# Patient Record
Sex: FEMALE | Race: WHITE | NOT HISPANIC OR LATINO | Employment: UNEMPLOYED | URBAN - NONMETROPOLITAN AREA
[De-identification: names, ages, dates, MRNs, and addresses within clinical notes are randomized per-mention and may not be internally consistent; named-entity substitution may affect disease eponyms.]

---

## 2020-09-17 RX ORDER — NORGESTIMATE AND ETHINYL ESTRADIOL 7DAYSX3 28
KIT ORAL
Qty: 28 TABLET | Refills: 0 | Status: SHIPPED | OUTPATIENT
Start: 2020-09-17 | End: 2020-10-08

## 2020-09-18 ENCOUNTER — TELEPHONE (OUTPATIENT)
Dept: OBSTETRICS AND GYNECOLOGY | Facility: CLINIC | Age: 30
End: 2020-09-18

## 2020-09-18 NOTE — TELEPHONE ENCOUNTER
Spoke with patient and she is aware that she needs annual before next refill; does not wish to schedule now but states she will call us to do so    ----- Message from Aspen Mcmahon NP sent at 9/17/2020 11:14 AM CDT -----  Birth control sent to pharmacy for one month, pt is overdue for annual

## 2021-03-22 ENCOUNTER — OFFICE VISIT (OUTPATIENT)
Dept: OBSTETRICS AND GYNECOLOGY | Facility: CLINIC | Age: 31
End: 2021-03-22
Payer: COMMERCIAL

## 2021-03-22 VITALS
BODY MASS INDEX: 49.87 KG/M2 | WEIGHT: 254 LBS | HEIGHT: 60 IN | DIASTOLIC BLOOD PRESSURE: 80 MMHG | SYSTOLIC BLOOD PRESSURE: 137 MMHG

## 2021-03-22 DIAGNOSIS — Z11.51 SPECIAL SCREENING EXAMINATION FOR HUMAN PAPILLOMAVIRUS (HPV): ICD-10-CM

## 2021-03-22 DIAGNOSIS — N83.209 CYST OF OVARY, UNSPECIFIED LATERALITY: ICD-10-CM

## 2021-03-22 DIAGNOSIS — Z01.419 WELL WOMAN EXAM: Primary | ICD-10-CM

## 2021-03-22 DIAGNOSIS — E66.01 MORBID OBESITY: ICD-10-CM

## 2021-03-22 DIAGNOSIS — N85.2 ENLARGED UTERUS: ICD-10-CM

## 2021-03-22 DIAGNOSIS — E03.9 HYPOTHYROIDISM, UNSPECIFIED TYPE: ICD-10-CM

## 2021-03-22 DIAGNOSIS — Z12.4 SCREENING FOR MALIGNANT NEOPLASM OF THE CERVIX: ICD-10-CM

## 2021-03-22 DIAGNOSIS — Z30.09 FAMILY PLANNING: ICD-10-CM

## 2021-03-22 PROCEDURE — 99385 PR PREVENTIVE VISIT,NEW,18-39: ICD-10-PCS | Mod: S$GLB,,, | Performed by: OBSTETRICS & GYNECOLOGY

## 2021-03-22 PROCEDURE — 99999 PR PBB SHADOW E&M-EST. PATIENT-LVL III: ICD-10-PCS | Mod: PBBFAC,,, | Performed by: OBSTETRICS & GYNECOLOGY

## 2021-03-22 PROCEDURE — 99385 PREV VISIT NEW AGE 18-39: CPT | Mod: S$GLB,,, | Performed by: OBSTETRICS & GYNECOLOGY

## 2021-03-22 PROCEDURE — 99999 PR PBB SHADOW E&M-EST. PATIENT-LVL III: CPT | Mod: PBBFAC,,, | Performed by: OBSTETRICS & GYNECOLOGY

## 2021-03-22 RX ORDER — LEVOTHYROXINE SODIUM 175 UG/1
175 TABLET ORAL DAILY
COMMUNITY
Start: 2021-01-09 | End: 2022-09-12 | Stop reason: SDUPTHER

## 2021-03-22 RX ORDER — PREGABALIN 50 MG/1
50 CAPSULE ORAL NIGHTLY
COMMUNITY
Start: 2020-12-23 | End: 2023-06-28 | Stop reason: DRUGHIGH

## 2021-03-22 RX ORDER — LEVOTHYROXINE SODIUM 175 UG/1
175 TABLET ORAL
Qty: 30 TABLET | Refills: 11 | Status: SHIPPED | OUTPATIENT
Start: 2021-03-22 | End: 2021-08-18 | Stop reason: SDUPTHER

## 2021-03-22 RX ORDER — NORGESTIMATE AND ETHINYL ESTRADIOL 7DAYSX3 28
1 KIT ORAL DAILY
Qty: 28 TABLET | Refills: 11 | Status: SHIPPED | OUTPATIENT
Start: 2021-03-22 | End: 2022-02-14

## 2021-03-26 LAB
HPV16+18+H RISK 12 DNA CVX-IMP: NORMAL
LIQUID BASED PAP SMEAR, SCREENING: NORMAL

## 2021-03-29 ENCOUNTER — OFFICE VISIT (OUTPATIENT)
Dept: OBSTETRICS AND GYNECOLOGY | Facility: CLINIC | Age: 31
End: 2021-03-29
Payer: COMMERCIAL

## 2021-03-29 ENCOUNTER — PROCEDURE VISIT (OUTPATIENT)
Dept: OBSTETRICS AND GYNECOLOGY | Facility: CLINIC | Age: 31
End: 2021-03-29
Payer: COMMERCIAL

## 2021-03-29 VITALS
BODY MASS INDEX: 51.04 KG/M2 | WEIGHT: 260 LBS | DIASTOLIC BLOOD PRESSURE: 70 MMHG | SYSTOLIC BLOOD PRESSURE: 135 MMHG | HEIGHT: 60 IN

## 2021-03-29 DIAGNOSIS — N83.209 CYST OF OVARY, UNSPECIFIED LATERALITY: Primary | ICD-10-CM

## 2021-03-29 DIAGNOSIS — R10.2 PELVIC PAIN: Primary | ICD-10-CM

## 2021-03-29 PROCEDURE — 76830 TRANSVAGINAL US NON-OB: CPT | Mod: 26,S$GLB,, | Performed by: OBSTETRICS & GYNECOLOGY

## 2021-03-29 PROCEDURE — 76830 PR  ECHOGRAPHY,TRANSVAGINAL: ICD-10-PCS | Mod: 26,S$GLB,, | Performed by: OBSTETRICS & GYNECOLOGY

## 2021-03-29 PROCEDURE — 86304 IMMUNOASSAY TUMOR CA 125: CPT | Performed by: OBSTETRICS & GYNECOLOGY

## 2021-03-29 PROCEDURE — 99214 OFFICE O/P EST MOD 30 MIN: CPT | Mod: 25,S$GLB,, | Performed by: OBSTETRICS & GYNECOLOGY

## 2021-03-29 PROCEDURE — 99999 PR PBB SHADOW E&M-EST. PATIENT-LVL III: ICD-10-PCS | Mod: PBBFAC,,, | Performed by: OBSTETRICS & GYNECOLOGY

## 2021-03-29 PROCEDURE — 99214 PR OFFICE/OUTPT VISIT, EST, LEVL IV, 30-39 MIN: ICD-10-PCS | Mod: 25,S$GLB,, | Performed by: OBSTETRICS & GYNECOLOGY

## 2021-03-29 PROCEDURE — 99999 PR PBB SHADOW E&M-EST. PATIENT-LVL III: CPT | Mod: PBBFAC,,, | Performed by: OBSTETRICS & GYNECOLOGY

## 2021-03-30 LAB — CANCER AG125 SERPL-ACNC: 32 U/ML (ref 0–30)

## 2021-04-26 ENCOUNTER — CLINICAL SUPPORT (OUTPATIENT)
Dept: OBSTETRICS AND GYNECOLOGY | Facility: CLINIC | Age: 31
End: 2021-04-26
Payer: COMMERCIAL

## 2021-04-26 DIAGNOSIS — N83.209 CYST OF OVARY, UNSPECIFIED LATERALITY: Primary | ICD-10-CM

## 2021-04-26 DIAGNOSIS — R89.9 ELEVATED LABORATORY TEST RESULT: ICD-10-CM

## 2021-04-26 PROCEDURE — 86304 IMMUNOASSAY TUMOR CA 125: CPT | Performed by: OBSTETRICS & GYNECOLOGY

## 2021-04-27 LAB — CANCER AG125 SERPL-ACNC: 30 U/ML (ref 0–30)

## 2021-08-18 DIAGNOSIS — E03.9 HYPOTHYROIDISM, UNSPECIFIED TYPE: ICD-10-CM

## 2021-08-19 RX ORDER — LEVOTHYROXINE SODIUM 175 UG/1
175 TABLET ORAL
Qty: 30 TABLET | Refills: 11 | Status: SHIPPED | OUTPATIENT
Start: 2021-08-19 | End: 2022-08-19

## 2022-09-12 RX ORDER — LEVOTHYROXINE SODIUM 175 UG/1
175 TABLET ORAL DAILY
Qty: 30 TABLET | Refills: 1 | Status: SHIPPED | OUTPATIENT
Start: 2022-09-12 | End: 2022-10-05

## 2022-09-22 ENCOUNTER — HOSPITAL ENCOUNTER (OUTPATIENT)
Dept: RADIOLOGY | Facility: HOSPITAL | Age: 32
Discharge: HOME | End: 2022-09-22
Attending: PHYSICIAN ASSISTANT
Payer: COMMERCIAL

## 2022-09-22 DIAGNOSIS — R60.9 EDEMA: ICD-10-CM

## 2022-09-22 DIAGNOSIS — R60.9 EDEMA: Primary | ICD-10-CM

## 2022-09-22 PROCEDURE — 93971 EXTREMITY STUDY: CPT | Mod: TC,LT

## 2022-10-05 ENCOUNTER — PROCEDURE VISIT (OUTPATIENT)
Dept: OBSTETRICS AND GYNECOLOGY | Facility: CLINIC | Age: 32
End: 2022-10-05
Payer: COMMERCIAL

## 2022-10-05 ENCOUNTER — OFFICE VISIT (OUTPATIENT)
Dept: OBSTETRICS AND GYNECOLOGY | Facility: CLINIC | Age: 32
End: 2022-10-05
Payer: COMMERCIAL

## 2022-10-05 VITALS
HEART RATE: 87 BPM | HEIGHT: 60 IN | SYSTOLIC BLOOD PRESSURE: 137 MMHG | BODY MASS INDEX: 42.41 KG/M2 | WEIGHT: 216 LBS | DIASTOLIC BLOOD PRESSURE: 94 MMHG

## 2022-10-05 DIAGNOSIS — E66.01 MORBID OBESITY: ICD-10-CM

## 2022-10-05 DIAGNOSIS — R10.2 PELVIC PAIN: Primary | ICD-10-CM

## 2022-10-05 DIAGNOSIS — N83.201 CYSTS OF BOTH OVARIES: ICD-10-CM

## 2022-10-05 DIAGNOSIS — N83.202 CYSTS OF BOTH OVARIES: ICD-10-CM

## 2022-10-05 DIAGNOSIS — E03.9 HYPOTHYROIDISM, UNSPECIFIED TYPE: ICD-10-CM

## 2022-10-05 DIAGNOSIS — R89.9 ELEVATED LABORATORY TEST RESULT: ICD-10-CM

## 2022-10-05 PROCEDURE — 76856 US OB/GYN EXTENDED PROCEDURE (VIEWPOINT): ICD-10-PCS | Mod: S$GLB,,, | Performed by: OBSTETRICS & GYNECOLOGY

## 2022-10-05 PROCEDURE — 76856 US EXAM PELVIC COMPLETE: CPT | Mod: S$GLB,,, | Performed by: OBSTETRICS & GYNECOLOGY

## 2022-10-05 PROCEDURE — 99999 PR PBB SHADOW E&M-EST. PATIENT-LVL III: ICD-10-PCS | Mod: PBBFAC,,, | Performed by: OBSTETRICS & GYNECOLOGY

## 2022-10-05 PROCEDURE — 99214 PR OFFICE/OUTPT VISIT, EST, LEVL IV, 30-39 MIN: ICD-10-PCS | Mod: 25,S$GLB,, | Performed by: OBSTETRICS & GYNECOLOGY

## 2022-10-05 PROCEDURE — 99999 PR PBB SHADOW E&M-EST. PATIENT-LVL III: CPT | Mod: PBBFAC,,, | Performed by: OBSTETRICS & GYNECOLOGY

## 2022-10-05 PROCEDURE — 86304 IMMUNOASSAY TUMOR CA 125: CPT | Performed by: OBSTETRICS & GYNECOLOGY

## 2022-10-05 PROCEDURE — 99214 OFFICE O/P EST MOD 30 MIN: CPT | Mod: 25,S$GLB,, | Performed by: OBSTETRICS & GYNECOLOGY

## 2022-10-05 RX ORDER — FUROSEMIDE 20 MG/1
20 TABLET ORAL DAILY
COMMUNITY
Start: 2022-09-22 | End: 2022-10-20 | Stop reason: SDUPTHER

## 2022-10-05 RX ORDER — LEVOTHYROXINE SODIUM 200 UG/1
200 TABLET ORAL DAILY
Qty: 30 TABLET | Refills: 3 | Status: SHIPPED | OUTPATIENT
Start: 2022-10-05 | End: 2022-12-08

## 2022-10-05 NOTE — PROGRESS NOTES
Subjective:    Patient ID: Mya Sarmiento is a 32 y.o. y.o. female    Chief Complaint:   Chief Complaint   Patient presents with    Pelvic Pain     C/o left sided pelvic pain on.off for the past month. States it hurts more after working out and states pain radiates to her back. Also c/o abnormal spotting throughout her cycle.       History of Present Illness:  Mya presents today for follow up ultrasound due to left-sided pelvic pain.  She still hurts worse after working out and that radiates to her back.  She has had some abnormal spotting throughout her cycle.  She has lost a significant amount of weight the last year.      Review of Systems   Constitutional:  Negative for chills, fatigue and fever.   Respiratory:  Negative for shortness of breath.    Cardiovascular:  Negative for chest pain.   Gastrointestinal:  Negative for abdominal pain, constipation, diarrhea and nausea.   Genitourinary:  Positive for pelvic pain. Negative for bladder incontinence, dysuria, hot flashes and vaginal bleeding.   Neurological:  Negative for headaches.   Psychiatric/Behavioral:  Negative for depression.        Objective:    Vital Signs:  Vitals:    10/05/22 0951   BP: (!) 137/94   Pulse: 87     Wt Readings from Last 1 Encounters:   10/05/22 98 kg (216 lb)     Body mass index is 42.18 kg/m².    Physical Exam:  General:  alert, no distress   Skin:  Skin color, texture, turgor normal. No rashes or lesions   Abdomen:   Soft, obese, nontender   Extremities: No cyanosis, clubbing, edema     Ultrasound: right ovary with a septated cyst largest diameter 10.1 cm; left ovary with a septated cyst largest diameter 11.9 cm.  These have not changed much in size since March of 2021.     Discussion with patient and her  regarding need for laparotomy and cystectomy.  Will also order  level to assess if there is any concern for malignancy.  In discussion with the couple I was concerned as these cysts have not been removed  previously.  She states that she was told that if they did not bother her she did not need them removed.  She does report more pelvic pain now so removal is definitely warranted.  Her  states that they tried to schedule removal but there insurance is out of network with our hospital.  He he is asking if I can go to a different hospital and I explained that I do not have privileges at other hospitals.  I will recommend physicians that have privileges at surrounding hospitals that are not in our network who hopefully have better coverage with his insurance in order to have these surgeries performed. All questions answered.    I spent a total of 30 minutes on the day of the visit.  This includes face to face time and non-face to face time preparing to see the patient (eg, review of tests), obtaining and/or reviewing separately obtained history, documenting clinical information in the electronic or other health record, independently interpreting results and communicating results to the patient/family/caregiver, or care coordinator.         Assessment:      1. Pelvic pain    2. Cysts of both ovaries    3. Morbid obesity    4. Hypothyroidism, unspecified type    5. Elevated laboratory test result          Plan:      Pelvic pain  -     ; Future; Expected date: 10/05/2022    Cysts of both ovaries  -     ; Future; Expected date: 10/05/2022    Morbid obesity    Hypothyroidism, unspecified type    Elevated laboratory test result    Gave names of Dr. Davis (Oleksandr) and the Paolo Mohamud (Rachelle) in order to be evaluated for surgery at another hospital that is in network with their insurance       Ashanti Turner MD, FACOG   10/05/2022 10:15 AM

## 2022-10-06 LAB — CANCER AG125 SERPL-ACNC: 162 U/ML (ref 0–30)

## 2022-10-06 NOTE — PROGRESS NOTES
Please call patient regarding results.  CA-125 more elevated--needs surgery. Needs to make an appt with gyn.

## 2022-10-20 ENCOUNTER — TELEPHONE (OUTPATIENT)
Dept: OBSTETRICS AND GYNECOLOGY | Facility: CLINIC | Age: 32
End: 2022-10-20

## 2022-10-20 RX ORDER — FUROSEMIDE 20 MG/1
20 TABLET ORAL DAILY
Qty: 30 TABLET | Refills: 0 | Status: SHIPPED | OUTPATIENT
Start: 2022-10-20 | End: 2022-11-14

## 2022-10-20 NOTE — TELEPHONE ENCOUNTER
Pt needs Lasix refill, on her last visit pt was told to call if she needed this medication refill. [facility]

## 2022-12-01 PROBLEM — I07.1 SEVERE TRICUSPID REGURGITATION: Status: RESOLVED | Noted: 2022-12-01 | Resolved: 2022-12-01

## 2022-12-01 PROBLEM — I27.21 PULMONARY ARTERIAL HYPERTENSION: Chronic | Status: ACTIVE | Noted: 2022-12-01

## 2022-12-01 PROBLEM — I27.20 PULMONARY HYPERTENSION: Status: ACTIVE | Noted: 2022-12-01

## 2022-12-01 PROBLEM — I27.20 PULMONARY HYPERTENSION: Status: RESOLVED | Noted: 2022-12-01 | Resolved: 2022-12-01

## 2022-12-01 PROBLEM — I07.1 SEVERE TRICUSPID REGURGITATION: Status: ACTIVE | Noted: 2022-12-01

## 2022-12-22 ENCOUNTER — TELEPHONE (OUTPATIENT)
Dept: TRANSPLANT | Facility: CLINIC | Age: 32
End: 2022-12-22
Payer: COMMERCIAL

## 2022-12-22 DIAGNOSIS — I27.9 CHRONIC PULMONARY HEART DISEASE: ICD-10-CM

## 2022-12-22 DIAGNOSIS — R06.82 TACHYPNEA: ICD-10-CM

## 2022-12-22 DIAGNOSIS — Z79.899 POLYPHARMACY: Primary | ICD-10-CM

## 2023-01-10 ENCOUNTER — LAB VISIT (OUTPATIENT)
Dept: LAB | Facility: HOSPITAL | Age: 33
End: 2023-01-10
Payer: COMMERCIAL

## 2023-01-10 ENCOUNTER — HOSPITAL ENCOUNTER (OUTPATIENT)
Dept: PULMONOLOGY | Facility: CLINIC | Age: 33
Discharge: HOME | End: 2023-01-10
Payer: COMMERCIAL

## 2023-01-10 ENCOUNTER — DOCUMENTATION ONLY (OUTPATIENT)
Dept: TRANSPLANT | Facility: CLINIC | Age: 33
End: 2023-01-10
Payer: COMMERCIAL

## 2023-01-10 ENCOUNTER — OFFICE VISIT (OUTPATIENT)
Dept: TRANSPLANT | Facility: CLINIC | Age: 33
End: 2023-01-10
Payer: COMMERCIAL

## 2023-01-10 VITALS — HEIGHT: 60 IN | BODY MASS INDEX: 40.05 KG/M2 | WEIGHT: 204 LBS

## 2023-01-10 VITALS
BODY MASS INDEX: 40.52 KG/M2 | HEART RATE: 73 BPM | SYSTOLIC BLOOD PRESSURE: 118 MMHG | WEIGHT: 206.38 LBS | HEIGHT: 60 IN | OXYGEN SATURATION: 97 % | DIASTOLIC BLOOD PRESSURE: 76 MMHG

## 2023-01-10 DIAGNOSIS — E07.9 THYROID DISEASE: ICD-10-CM

## 2023-01-10 DIAGNOSIS — R06.82 TACHYPNEA: ICD-10-CM

## 2023-01-10 DIAGNOSIS — I27.21 PULMONARY ARTERIAL HYPERTENSION: Primary | Chronic | ICD-10-CM

## 2023-01-10 DIAGNOSIS — N83.209 CYST OF OVARY, UNSPECIFIED LATERALITY: ICD-10-CM

## 2023-01-10 DIAGNOSIS — Z79.899 POLYPHARMACY: ICD-10-CM

## 2023-01-10 DIAGNOSIS — I27.9 CHRONIC PULMONARY HEART DISEASE: ICD-10-CM

## 2023-01-10 LAB
ALBUMIN SERPL BCP-MCNC: 2.9 G/DL (ref 3.5–5.2)
ALP SERPL-CCNC: 96 U/L (ref 55–135)
ALT SERPL W/O P-5'-P-CCNC: 37 U/L (ref 10–44)
ANION GAP SERPL CALC-SCNC: 10 MMOL/L (ref 8–16)
AST SERPL-CCNC: 34 U/L (ref 10–40)
BASOPHILS # BLD AUTO: 0.05 K/UL (ref 0–0.2)
BASOPHILS NFR BLD: 0.8 % (ref 0–1.9)
BILIRUB SERPL-MCNC: 0.9 MG/DL (ref 0.1–1)
BNP SERPL-MCNC: 235 PG/ML (ref 0–99)
BUN SERPL-MCNC: 17 MG/DL (ref 6–20)
CALCIUM SERPL-MCNC: 8.7 MG/DL (ref 8.7–10.5)
CHLORIDE SERPL-SCNC: 107 MMOL/L (ref 95–110)
CO2 SERPL-SCNC: 22 MMOL/L (ref 23–29)
CREAT SERPL-MCNC: 0.8 MG/DL (ref 0.5–1.4)
DIFFERENTIAL METHOD: ABNORMAL
EOSINOPHIL # BLD AUTO: 0.3 K/UL (ref 0–0.5)
EOSINOPHIL NFR BLD: 5.6 % (ref 0–8)
ERYTHROCYTE [DISTWIDTH] IN BLOOD BY AUTOMATED COUNT: 17.5 % (ref 11.5–14.5)
EST. GFR  (NO RACE VARIABLE): >60 ML/MIN/1.73 M^2
GLUCOSE SERPL-MCNC: 74 MG/DL (ref 70–110)
HCT VFR BLD AUTO: 37.3 % (ref 37–48.5)
HGB BLD-MCNC: 10.9 G/DL (ref 12–16)
IMM GRANULOCYTES # BLD AUTO: 0.02 K/UL (ref 0–0.04)
IMM GRANULOCYTES NFR BLD AUTO: 0.3 % (ref 0–0.5)
LYMPHOCYTES # BLD AUTO: 0.9 K/UL (ref 1–4.8)
LYMPHOCYTES NFR BLD: 14.4 % (ref 18–48)
MAGNESIUM SERPL-MCNC: 2.1 MG/DL (ref 1.6–2.6)
MCH RBC QN AUTO: 21.7 PG (ref 27–31)
MCHC RBC AUTO-ENTMCNC: 29.2 G/DL (ref 32–36)
MCV RBC AUTO: 74 FL (ref 82–98)
MONOCYTES # BLD AUTO: 0.5 K/UL (ref 0.3–1)
MONOCYTES NFR BLD: 8.1 % (ref 4–15)
NEUTROPHILS # BLD AUTO: 4.2 K/UL (ref 1.8–7.7)
NEUTROPHILS NFR BLD: 70.8 % (ref 38–73)
NRBC BLD-RTO: 0 /100 WBC
PLATELET # BLD AUTO: 266 K/UL (ref 150–450)
PMV BLD AUTO: 11.6 FL (ref 9.2–12.9)
POTASSIUM SERPL-SCNC: 4.2 MMOL/L (ref 3.5–5.1)
PROT SERPL-MCNC: 6.4 G/DL (ref 6–8.4)
RBC # BLD AUTO: 5.02 M/UL (ref 4–5.4)
SODIUM SERPL-SCNC: 139 MMOL/L (ref 136–145)
WBC # BLD AUTO: 5.91 K/UL (ref 3.9–12.7)

## 2023-01-10 PROCEDURE — 99204 PR OFFICE/OUTPT VISIT, NEW, LEVL IV, 45-59 MIN: ICD-10-PCS | Mod: S$GLB,,,

## 2023-01-10 PROCEDURE — 83880 ASSAY OF NATRIURETIC PEPTIDE: CPT

## 2023-01-10 PROCEDURE — 83735 ASSAY OF MAGNESIUM: CPT

## 2023-01-10 PROCEDURE — 99999 PR PBB SHADOW E&M-EST. PATIENT-LVL IV: CPT | Mod: PBBFAC,,,

## 2023-01-10 PROCEDURE — 85025 COMPLETE CBC W/AUTO DIFF WBC: CPT

## 2023-01-10 PROCEDURE — 80053 COMPREHEN METABOLIC PANEL: CPT

## 2023-01-10 PROCEDURE — 94618 PULMONARY STRESS TESTING: ICD-10-PCS | Mod: S$GLB,,, | Performed by: INTERNAL MEDICINE

## 2023-01-10 PROCEDURE — 99999 PR PBB SHADOW E&M-EST. PATIENT-LVL IV: ICD-10-PCS | Mod: PBBFAC,,,

## 2023-01-10 PROCEDURE — 36415 COLL VENOUS BLD VENIPUNCTURE: CPT

## 2023-01-10 PROCEDURE — 94618 PULMONARY STRESS TESTING: CPT | Mod: S$GLB,,, | Performed by: INTERNAL MEDICINE

## 2023-01-10 PROCEDURE — 99204 OFFICE O/P NEW MOD 45 MIN: CPT | Mod: S$GLB,,,

## 2023-01-10 RX ORDER — LEVOTHYROXINE SODIUM 175 UG/1
175 TABLET ORAL DAILY
COMMUNITY
Start: 2022-12-15

## 2023-01-10 NOTE — PATIENT INSTRUCTIONS
Schedule labs, right heart cath and appointment.      Contact Shayna Maciel at [phone] with any questions.      Recommend 2 gram sodium restriction and 1500cc fluid restriction.  Encourage physical activity with graded exercise program.  Requested patient to weigh themselves daily, and to notify us if their weight increases by more than 3 lbs in 1 day or 5 lbs in 1 week.

## 2023-01-10 NOTE — PROCEDURES
Mya Sarmiento is a 32 y.o.  female patient, who presents for a 6 minute walk test ordered by JENNIFER Carvalho.  The diagnosis is Pulmonary Hypertension.  The patient's BMI is 39.8 kg/m2.  Predicted distance (lower limit of normal) is 443.09 meters.      Test Results:    The test was completed without stopping.  The total time walked was 360 seconds.  During walking, the patient reported:  Dyspnea.  The patient used no assistive devices during testing.     01/10/2023---------Distance: 365.76 meters (1200 feet)     O2 Sat % Supplemental Oxygen Heart Rate Blood Pressure Ayala Scale   Pre-exercise  (Resting) 99 % Room Air 80 bpm 125/67 mmHg 0   During Exercise 98 % Room Air 114 bpm 134/90 mmHg 0.5   Post-exercise  (Recovery) 97 % Room Air  94 bpm 134/71 mmHg      Recovery Time: 194 seconds    Performing nurse/tech: Milo BINGHAM      PREVIOUS STUDY:   The patient has not had a previous study.      CLINICAL INTERPRETATION:  Six minute walk distance is 365.76 meters (1200 feet) with very, very light dyspnea.  During exercise, there was no significant desaturation while breathing room air.  Both blood pressure and heart rate increased significantly with walking.  The patient did not report non-pulmonary symptoms during exercise.  No previous study performed.  Based upon age and body mass index, exercise capacity is less than predicted.

## 2023-01-10 NOTE — PROGRESS NOTES
01/10/2023   Mya Brinky  [address]        OUTPATIENT RIGHT HEART CATHETERIZATION INSTRUCTIONS     You have been scheduled for a procedure in the catheterization lab on 2/1/2023.      Please report to the Cardiology Waiting Area on the Third floor of the hospital and check in at 8:30 am.    You will then be taken to the SSCU (Short Stay Cardiac Unit) and prepared for your procedure. Please be aware that this is not the time of your procedure but the time you are to arrive. The procedures are scheduled on an hourly basis; however, emergency cases take precedence over all other cases.      You may eat a light meal before your procedure.    You may take your regular morning medications with water. If there are any medications that you should not take you will be instructed to hold them that morning.   If you are on Pradaxa, Eliquis or Coumadin , you can hold them 3 days prior to your procedure.  CALL US if you are on blood thinners for instructions. [phone]  Do not stop your Aspirin, Plavix, Effient, or Brilinta.    The procedure will take 30 minutes to perform. After the procedure, you will return to SSCU on the third floor of the hospital. Your family may remain in the room with you during this time.     You will be monitored for bleeding from the puncture site.  Your dressing may be removed the next day and dressed with a Band-Aid.  You may be able to be discharged home that same afternoon if there is someone to drive you home and there were no complications.     You may need to be admitted to the hospital after your procedure, so pack an overnight bag. Your doctor will determine, based on your progress, the date and time of your discharge. The results of your procedure will be discussed with you before you are discharged. Any further testing or procedures will be scheduled for you either before you leave or you will be called with these appointments.      If you should have any  questions, concerns, or need to change the date of your procedure, please call  Heart Transplant office and ask for your nurse. [phone]    Special Instructions:     THE ABOVE INSTRUCTIONS WERE GIVEN TO THE PATIENT VERBALLY AND THEY VERBALIZED UNDERSTANDING.  THEY DO NOT REQUIRE ANY SPECIAL NEEDS AND DO NOT HAVE ANY LEARNING BARRIERS.     Directions for Reporting to Cardiology Waiting Area in the Hospital  If you park in the Parking Garage:  Take elevators to the1st floor of the parking garage.  Continue past the gift shop, coffee shop, and piano.  Take a right and go to the gold elevators. (Elevator B)  Take the elevator to the 3rd floor.  Follow the arrow on the sign on the wall that says Cath Lab Registration/EP Lab Registration.  Follow the long hallway all the way around until you come to a big open area.  This is the registration area.  Check in at Reception Desk.     OR     If family is dropping you off:  Have them drop you off at the front of the Hospital under the green overhang.  Enter through the doors and take a right.  Take the E elevators to the 3rd floor Cardiology Waiting Area.  Check in at the Reception Desk in the waiting room.

## 2023-01-10 NOTE — PROGRESS NOTES
Subjective:    Patient ID:  Mya Sarmiento is a 32 y.o. female who presents for evaluation of Pulmonary Hypertension.    HPI   Mrs. Sarmiento was referred by Dr. Magana who saw patient on 12/15/2022. Mrs. Sarmiento has a history of thyroid disease, obesity, and severe PAH by RHC performed on 12/1/2022. Has a smoking history - quit 7 years ago. Was diagnosed with ovarian cysts following c/o pain in L side. Had tests done pre-procedure with ECHO concerning for severe PAH, followed by L/RHC that was consistent with severe PAH and no CAD noted. Other tests include negative V/Q and normal CXR. CT and PFTs have been completed but need to obtain.    Mrs. Sarmiento reports being generally healthy. She has lost 80 lbs over the last year with diet and exercise. She exercises 3x a week for 30 minutes to an hour doing bike, swim, stepper, hula hoop. Reports taking lasix 20 mg daily. It does make her go to the bathroom. Has noticed some fluid in her legs. Endorses neuropathy in her feet. Denies SOB, RAMIREZ, cough, dizziness, pre-syncope, syncope. Reports not experiencing symptoms or knowing there was a problem until testing was done in October. Has some L sided pain with ovarian cysts, but is not activity limiting. Denies hx of heart disease or clotting disorder, illicit drug use, diet pills, CTD, or autoimmune disorders. Denies concerning family history. She has a 10 year-old son (uneventful birth) and 19 year-old stepson.      6MWT:  6MW 1/10/2023   6MWT Status completed without stopping   Patient Reported Dyspnea   Was O2 used? No   6MW Distance walked (feet) 1200   Distance walked (meters) 365.76   Did patient stop? No   Oxygen Saturation 99   Supplemental Oxygen Room Air   Heart Rate 80   Blood Pressure 125/67   Ayala Dyspnea Rating  nothing at all   Oxygen Saturation 98   Supplemental Oxygen Room Air   Heart Rate 114   Blood Pressure 134/90   Ayala Dyspnea Rating  very, very light (just noticeable)   Recovery Time (seconds) 194   Oxygen  Saturation 97   Supplemental Oxygen Room Air   Heart Rate 94     ECHO:             L/RHC: 12/1/2022  Procedure:  Left heart catheterization right heart catheterization shunt run and assessment of pulmonary vasoreactivity  Pre Op Diagnosis:  Pulmonary arterial hypertension  Post Op Diagnosis:  Same  Indications:  Same  Physician: Dr. Kee Magana MD  Entry:  Left radial artery and left brachial vein  Findings:  Coronary angiography performed initially.    All coronary arteries are normal.    Normal origin of the left and right coronary ostium   Left main is widely patent  Lad and diagonal branches are widely patent   Circumflex and marginal branches are widely  Right coronary is a dominant vessel  Right coronary has a normal origin and is widely patent   The PDA and posterolateral branches are widely patent  There is no coronary fistula or coronary anomaly     LV function is normal on LV g  No gradient on pullback and no intraventricular gradient  No mitral regurgitation seen     Right heart catheterization was then performed and shunt run was performed.     Superior vena cava 71%  Right atrium 68%  Inferior vena cava 67%  Right ventricle 67%  Right ventricular outflow tract 69%   Main pulmonary artery 62%   Right upper pulmonary artery 65%  Left ventricle 94%   Aorta/subclavian 92%       LV pressure 127/20  Aortic pressure is 127/93  Wedge pressure 20   PA pressure is 110/48  RV pressure 110/36   RA pressure 40  Cardiac output 4.3 average over 3 measurements  Cardiac index 2.3     PVR = 11.8 Woods units     BSA  2.0 m2     Patient was then examined after giving supplemental oxygen for 5 minutes and there was no significant change in her pulmonary pressures.    Patient was examined after IV adenosine given at sequential dosing in 50 mcg increments peaking at a 250 microgram/kilogram per minute dosing   There does not appear to be any significant change in the pulmonary pressures indicating lack of pulmonary vaso  reactivity.    V/Q SCAN: 12/13/2022  FINDINGS:  No ventilatory defects.     No perfusion defects.     Impression:     Low probability for pulmonary embolism.    CXR: 12/13/2022  FINDINGS:  Frontal chest radiograph demonstrates clear lungs.  No pleural fluid.  Cardiomediastinal silhouette is unremarkable.  No osseous abnormality.     Impression:     No evidence of cardiopulmonary disease.    Review of Systems   Constitutional: Negative.   HENT: Negative.     Eyes: Negative.    Cardiovascular: Negative.    Respiratory: Negative.     Endocrine: Negative.    Hematologic/Lymphatic: Negative.    Skin: Negative.    Musculoskeletal: Negative.    Gastrointestinal: Negative.    Genitourinary: Negative.    Neurological: Negative.    Psychiatric/Behavioral: Negative.     Allergic/Immunologic: Negative.       Objective: /76 (BP Location: Right arm, Patient Position: Sitting, BP Method: Medium (Automatic))   Pulse 73   Ht 5' (1.524 m)   Wt 93.6 kg (206 lb 5.6 oz)   SpO2 97%   BMI 40.30 kg/m²     Physical Exam  Constitutional:       General: She is not in acute distress.     Appearance: Normal appearance. She is not ill-appearing.   HENT:      Head: Normocephalic.      Nose: Nose normal.   Eyes:      Extraocular Movements: Extraocular movements intact.   Cardiovascular:      Rate and Rhythm: Normal rate and regular rhythm.      Pulses: Normal pulses.      Heart sounds: Normal heart sounds.   Abdominal:      Palpations: Abdomen is soft.   Musculoskeletal:         General: Normal range of motion.      Cervical back: Normal range of motion.   Skin:     General: Skin is warm and dry.      Capillary Refill: Capillary refill takes less than 2 seconds.   Neurological:      Mental Status: She is oriented to person, place, and time.   Psychiatric:         Mood and Affect: Mood normal.         Behavior: Behavior normal.         Lab Results   Component Value Date     (H) 01/10/2023     01/10/2023    K 4.2 01/10/2023     MG 2.1 01/10/2023     01/10/2023    CO2 22 (L) 01/10/2023    BUN 17 01/10/2023    CREATININE 0.8 01/10/2023    GLU 74 01/10/2023    AST 34 01/10/2023    ALT 37 01/10/2023    ALBUMIN 2.9 (L) 01/10/2023    PROT 6.4 01/10/2023    BILITOT 0.9 01/10/2023       Magnesium   Date Value Ref Range Status   01/10/2023 2.1 1.6 - 2.6 mg/dL Final       Lab Results   Component Value Date    WBC 5.91 01/10/2023    HGB 10.9 (L) 01/10/2023    HCT 37.3 01/10/2023    MCV 74 (L) 01/10/2023     01/10/2023       No results found for: INR, PROTIME    BNP   Date Value Ref Range Status   01/10/2023 235 (H) 0 - 99 pg/mL Final     Comment:     Values of less than 100 pg/ml are consistent with non-CHF populations.       No results found for: LDH      ..  WHO Group: 1 (per outside cath)  Functional Class: I  REVEAL Score: 7 (Intermediate Risk)    Assessment:       1. Pulmonary arterial hypertension    2. Thyroid disease    3. Cyst of ovary, unspecified laterality         Plan:     Appears to have WHO Group 1 PAH with unclear etiology. Will repeat RHC though very likely to need medication. Symptoms do not appear to match severity of PAH. Need to obtain CT CHEST and PFTs for review. Ordered PH lab work. Also ordered sleep study. May need referral for genetic counseling/testing.    Reviewed chart with Dr. Sparkle Cuello.      Schedule labs, right heart cath and appointment.    Contact Shayna Maciel at [phone] with any questions.    Recommend 2 gram sodium restriction and 1500cc fluid restriction.  Encourage physical activity with graded exercise program.  Requested patient to weigh themselves daily, and to notify us if their weight increases by more than 3 lbs in 1 day or 5 lbs in 1 week.    Thank you for allowing us to participate in the cardiopulmonary management of this patient. We look forward to partnering in their care. Please contact us with any questions or concerns you may have regarding this patient.

## 2023-01-25 ENCOUNTER — TELEPHONE (OUTPATIENT)
Dept: TRANSPLANT | Facility: CLINIC | Age: 33
End: 2023-01-25
Payer: COMMERCIAL

## 2023-01-25 NOTE — TELEPHONE ENCOUNTER
"Patient states that she was connected to the "wrong people" for the patient call back message. Patient stated that she wanted to be connected to billing.  Patient did ask if she should take her medication ("thyroid pill and fluid pill") and eat anything before her test. NN advised that yes she can eat a light breakfast and take those medications.  "

## 2023-02-01 ENCOUNTER — HOSPITAL ENCOUNTER (INPATIENT)
Facility: HOSPITAL | Age: 33
LOS: 2 days | Discharge: HOME | DRG: 287 | End: 2023-02-03
Attending: INTERNAL MEDICINE | Admitting: INTERNAL MEDICINE
Payer: COMMERCIAL

## 2023-02-01 DIAGNOSIS — I50.33 ACUTE ON CHRONIC CONGESTIVE HEART FAILURE WITH RIGHT VENTRICULAR DIASTOLIC DYSFUNCTION: Primary | ICD-10-CM

## 2023-02-01 DIAGNOSIS — I50.82 ACUTE ON CHRONIC CONGESTIVE HEART FAILURE WITH RIGHT VENTRICULAR DIASTOLIC DYSFUNCTION: Primary | ICD-10-CM

## 2023-02-01 DIAGNOSIS — I27.20 PULMONARY HYPERTENSION: ICD-10-CM

## 2023-02-01 DIAGNOSIS — I50.9 CHF (CONGESTIVE HEART FAILURE): ICD-10-CM

## 2023-02-01 LAB
ANION GAP SERPL CALC-SCNC: 12 MMOL/L (ref 8–16)
ASCENDING AORTA: 2.98 CM
AV INDEX (PROSTH): 0.7
AV MEAN GRADIENT: 6 MMHG
AV PEAK GRADIENT: 10 MMHG
AV VALVE AREA: 2 CM2
AV VELOCITY RATIO: 0.69
BSA FOR ECHO PROCEDURE: 1.95 M2
BUN SERPL-MCNC: 15 MG/DL (ref 6–20)
CALCIUM SERPL-MCNC: 8.6 MG/DL (ref 8.7–10.5)
CHLORIDE SERPL-SCNC: 105 MMOL/L (ref 95–110)
CO2 SERPL-SCNC: 19 MMOL/L (ref 23–29)
CREAT SERPL-MCNC: 0.9 MG/DL (ref 0.5–1.4)
CV ECHO LV RWT: 0.47 CM
DOP CALC AO PEAK VEL: 1.55 M/S
DOP CALC AO VTI: 27.43 CM
DOP CALC LVOT AREA: 2.9 CM2
DOP CALC LVOT DIAMETER: 1.91 CM
DOP CALC LVOT PEAK VEL: 1.07 M/S
DOP CALC LVOT STROKE VOLUME: 54.87 CM3
DOP CALCLVOT PEAK VEL VTI: 19.16 CM
E WAVE DECELERATION TIME: 210.58 MSEC
E/A RATIO: 1.34
E/E' RATIO: 9.11 M/S
ECHO LV POSTERIOR WALL: 0.85 CM (ref 0.6–1.1)
EJECTION FRACTION: 60 %
EST. GFR  (NO RACE VARIABLE): >60 ML/MIN/1.73 M^2
FRACTIONAL SHORTENING: 32 % (ref 28–44)
GLUCOSE SERPL-MCNC: 102 MG/DL (ref 70–110)
INTERVENTRICULAR SEPTUM: 0.81 CM (ref 0.6–1.1)
LA MAJOR: 5.24 CM
LA MINOR: 5.18 CM
LA WIDTH: 3.48 CM
LEFT ATRIUM SIZE: 3.11 CM
LEFT ATRIUM VOLUME INDEX MOD: 22.7 ML/M2
LEFT ATRIUM VOLUME INDEX: 25.8 ML/M2
LEFT ATRIUM VOLUME MOD: 42.27 CM3
LEFT ATRIUM VOLUME: 47.93 CM3
LEFT INTERNAL DIMENSION IN SYSTOLE: 2.46 CM (ref 2.1–4)
LEFT VENTRICLE DIASTOLIC VOLUME INDEX: 30.12 ML/M2
LEFT VENTRICLE DIASTOLIC VOLUME: 56.03 ML
LEFT VENTRICLE MASS INDEX: 45 G/M2
LEFT VENTRICLE SYSTOLIC VOLUME INDEX: 11.5 ML/M2
LEFT VENTRICLE SYSTOLIC VOLUME: 21.38 ML
LEFT VENTRICULAR INTERNAL DIMENSION IN DIASTOLE: 3.64 CM (ref 3.5–6)
LEFT VENTRICULAR MASS: 84.34 G
LV LATERAL E/E' RATIO: 8.2 M/S
LV SEPTAL E/E' RATIO: 10.25 M/S
MAGNESIUM SERPL-MCNC: 2 MG/DL (ref 1.6–2.6)
MV A" WAVE DURATION": 11.8 MSEC
MV PEAK A VEL: 0.61 M/S
MV PEAK E VEL: 0.82 M/S
MV STENOSIS PRESSURE HALF TIME: 61.07 MS
MV VALVE AREA P 1/2 METHOD: 3.6 CM2
PISA TR MAX VEL: 4.42 M/S
POTASSIUM SERPL-SCNC: 3.3 MMOL/L (ref 3.5–5.1)
PULM VEIN S/D RATIO: 1.17
PV PEAK D VEL: 0.52 M/S
PV PEAK S VEL: 0.61 M/S
RA MAJOR: 6.07 CM
RA PRESSURE: 8 MMHG
RA WIDTH: 5.21 CM
RIGHT VENTRICULAR END-DIASTOLIC DIMENSION: 5.6 CM
SINUS: 2.5 CM
SODIUM SERPL-SCNC: 136 MMOL/L (ref 136–145)
STJ: 2.06 CM
TDI LATERAL: 0.1 M/S
TDI SEPTAL: 0.08 M/S
TDI: 0.09 M/S
TR MAX PG: 78 MMHG
TRICUSPID ANNULAR PLANE SYSTOLIC EXCURSION: 1.31 CM
TV REST PULMONARY ARTERY PRESSURE: 86 MMHG

## 2023-02-01 PROCEDURE — 25000003 PHARM REV CODE 250: Performed by: STUDENT IN AN ORGANIZED HEALTH CARE EDUCATION/TRAINING PROGRAM

## 2023-02-01 PROCEDURE — 63600175 PHARM REV CODE 636 W HCPCS

## 2023-02-01 PROCEDURE — 93451 RIGHT HEART CATH: CPT | Performed by: INTERNAL MEDICINE

## 2023-02-01 PROCEDURE — 63600175 PHARM REV CODE 636 W HCPCS: Performed by: STUDENT IN AN ORGANIZED HEALTH CARE EDUCATION/TRAINING PROGRAM

## 2023-02-01 PROCEDURE — 93451 RIGHT HEART CATH: CPT | Mod: 26,,, | Performed by: INTERNAL MEDICINE

## 2023-02-01 PROCEDURE — 36415 COLL VENOUS BLD VENIPUNCTURE: CPT | Performed by: STUDENT IN AN ORGANIZED HEALTH CARE EDUCATION/TRAINING PROGRAM

## 2023-02-01 PROCEDURE — 20600001 HC STEP DOWN PRIVATE ROOM

## 2023-02-01 PROCEDURE — C1894 INTRO/SHEATH, NON-LASER: HCPCS | Performed by: INTERNAL MEDICINE

## 2023-02-01 PROCEDURE — 25000003 PHARM REV CODE 250: Performed by: INTERNAL MEDICINE

## 2023-02-01 PROCEDURE — 80048 BASIC METABOLIC PNL TOTAL CA: CPT | Mod: XB | Performed by: STUDENT IN AN ORGANIZED HEALTH CARE EDUCATION/TRAINING PROGRAM

## 2023-02-01 PROCEDURE — 83735 ASSAY OF MAGNESIUM: CPT | Performed by: STUDENT IN AN ORGANIZED HEALTH CARE EDUCATION/TRAINING PROGRAM

## 2023-02-01 PROCEDURE — 63600175 PHARM REV CODE 636 W HCPCS: Performed by: PHYSICIAN ASSISTANT

## 2023-02-01 PROCEDURE — C1751 CATH, INF, PER/CENT/MIDLINE: HCPCS | Performed by: INTERNAL MEDICINE

## 2023-02-01 PROCEDURE — 25000003 PHARM REV CODE 250: Performed by: PHYSICIAN ASSISTANT

## 2023-02-01 PROCEDURE — 93451 PR RIGHT HEART CATH O2 SATURATION & CARDIAC OUTPUT: ICD-10-PCS | Mod: 26,,, | Performed by: INTERNAL MEDICINE

## 2023-02-01 RX ORDER — PREGABALIN 50 MG/1
50 CAPSULE ORAL NIGHTLY
Status: DISCONTINUED | OUTPATIENT
Start: 2023-02-01 | End: 2023-02-03 | Stop reason: HOSPADM

## 2023-02-01 RX ORDER — FUROSEMIDE 10 MG/ML
80 INJECTION INTRAMUSCULAR; INTRAVENOUS ONCE
Status: COMPLETED | OUTPATIENT
Start: 2023-02-01 | End: 2023-02-01

## 2023-02-01 RX ORDER — ACETAMINOPHEN 325 MG/1
650 TABLET ORAL EVERY 4 HOURS PRN
Status: DISCONTINUED | OUTPATIENT
Start: 2023-02-01 | End: 2023-02-03 | Stop reason: HOSPADM

## 2023-02-01 RX ORDER — POTASSIUM CHLORIDE 20 MEQ/1
40 TABLET, EXTENDED RELEASE ORAL
Status: COMPLETED | OUTPATIENT
Start: 2023-02-01 | End: 2023-02-02

## 2023-02-01 RX ORDER — SODIUM CHLORIDE 0.9 % (FLUSH) 0.9 %
10 SYRINGE (ML) INJECTION
Status: DISCONTINUED | OUTPATIENT
Start: 2023-02-01 | End: 2023-02-03 | Stop reason: HOSPADM

## 2023-02-01 RX ORDER — ACETAMINOPHEN 325 MG/1
650 TABLET ORAL EVERY 4 HOURS PRN
Status: DISCONTINUED | OUTPATIENT
Start: 2023-02-01 | End: 2023-02-01

## 2023-02-01 RX ORDER — ONDANSETRON 2 MG/ML
INJECTION INTRAMUSCULAR; INTRAVENOUS
Status: COMPLETED
Start: 2023-02-01 | End: 2023-02-01

## 2023-02-01 RX ORDER — ONDANSETRON 2 MG/ML
4 INJECTION INTRAMUSCULAR; INTRAVENOUS ONCE
Status: COMPLETED | OUTPATIENT
Start: 2023-02-01 | End: 2023-02-01

## 2023-02-01 RX ORDER — LIDOCAINE HYDROCHLORIDE 10 MG/ML
INJECTION, SOLUTION EPIDURAL; INFILTRATION; INTRACAUDAL; PERINEURAL
Status: DISCONTINUED | OUTPATIENT
Start: 2023-02-01 | End: 2023-02-03 | Stop reason: HOSPADM

## 2023-02-01 RX ADMIN — FUROSEMIDE 80 MG: 10 INJECTION, SOLUTION INTRAMUSCULAR; INTRAVENOUS at 02:02

## 2023-02-01 RX ADMIN — POTASSIUM CHLORIDE 40 MEQ: 1500 TABLET, EXTENDED RELEASE ORAL at 11:02

## 2023-02-01 RX ADMIN — PREGABALIN 50 MG: 50 CAPSULE ORAL at 08:02

## 2023-02-01 RX ADMIN — POTASSIUM CHLORIDE 40 MEQ: 1500 TABLET, EXTENDED RELEASE ORAL at 09:02

## 2023-02-01 RX ADMIN — ONDANSETRON: 2 INJECTION INTRAMUSCULAR; INTRAVENOUS at 10:02

## 2023-02-01 RX ADMIN — FUROSEMIDE 20 MG/HR: 10 INJECTION, SOLUTION INTRAMUSCULAR; INTRAVENOUS at 02:02

## 2023-02-01 RX ADMIN — ONDANSETRON 4 MG: 2 INJECTION INTRAMUSCULAR; INTRAVENOUS at 10:02

## 2023-02-01 NOTE — LETTER
February 2, 2023         [address]  [place] [address]  Phone: [phone]  Fax: [phone]       Date of Visit: 02/02/2023    To Whom It May Concern:    Please be advised that under state and federal laws as it relates to patient privacy and Health Insurance Accountability Act (HIPAA), we can not release our patient(s) name without authorization. Although, we can confirm that the individual listed below did accompany a person to our facility for healthcare services to be provided.    This document confirms that Jeremi Sarmiento accompanied a patient to our facility on 02/02/2023.    Sincerely,     Abby Ortiz RN

## 2023-02-01 NOTE — Clinical Note
The PA catheter was repositioned to the RPW. Hemodynamics were performed. An angiography was performed.

## 2023-02-01 NOTE — LETTER
February 2, 2023         [address]  [place] [address]  Phone: [phone]  Fax: [phone]       Date of Visit: 02/02/2023    To Whom It May Concern:    Please be advised that under state and federal laws as it relates to patient privacy and Health Insurance Accountability Act (HIPAA), we can not release our patient(s) name without authorization. Although, we can confirm that the individual listed below did accompany a person to our facility for healthcare services to be provided.    This document confirms that Nhan Sarmiento accompanied a patient to our facility on 02/02/2023.    Sincerely,     Abby Ortiz RN

## 2023-02-01 NOTE — HPI
Mrs. Sarmiento has a history of thyroid disease, obesity, and severe PAH by RHC performed on 12/1/2022. Has a smoking history - quit 7 years ago. Was diagnosed with ovarian cysts following c/o pain in L side. Had tests done pre-procedure with ECHO concerning for severe PAH, followed by L/RHC that was consistent with severe PAH and no CAD noted. Other tests include negative V/Q and normal CXR. CT and PFTs have been completed but need to obtain.     Mrs. Sarmiento reports being generally healthy. She has lost 80 lbs over the last year with diet and exercise. She exercises 3x a week for 30 minutes to an hour doing bike, swim, stepper, hula hoop. Reports taking lasix 20 mg daily. It does make her go to the bathroom. Has noticed some fluid in her legs. Endorses neuropathy in her feet. Denies SOB, RAMIREZ, cough, dizziness, pre-syncope, syncope. Reports not experiencing symptoms or knowing there was a problem until testing was done in October. Has some L sided pain with ovarian cysts, but is not activity limiting. Denies hx of heart disease or clotting disorder, illicit drug use, diet pills, CTD, or autoimmune disorders. Denies concerning family history. She has a 10 year-old son (uneventful birth) and 19 year-old stepson.    She is being admitted after RHC showed: RA: 27/ 27/ 24 RV: 94/ 8/ 24 PA: 94/ 40/ 58 PWP: 10/ 27/ 12 . Cardiac output was 4.8  by Tesfaye. Cardiac index is 2.6 L/min/m2.   O2 Sat: PA 62%. Pulmonary vascular resistance: 9.6 CRAWFORD.   Condition 2: Peter at 20 ppm.  PA 97/39 (62)  PCWP 15, 27 (14)      Condition 3 Peter 40 ppm.  PA 96/37 (61)  PCWP 15, 21 (14)    Condition 4: Peter 80 ppm.  PA 96/38 (61)  PCWP 15/20 (14)  CO 5.5 l/min CI 3 l/min/m2    Plan is to admit for diuresis and discuss therapy for PH

## 2023-02-01 NOTE — ASSESSMENT & PLAN NOTE
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 Cardiac output was 4.8  by Tesfaye. Cardiac index is 2.6 L/min/m2.   -Will diuresis with Lasix infusion at 20mg/hr after 80mg IVP  -2g Na dietary restriction, 1500 mL fluid restriction, strict I/Os

## 2023-02-01 NOTE — SUBJECTIVE & OBJECTIVE
Past Medical History:   Diagnosis Date    Morbidly obese     Neuropathy     FEET    Ovarian cyst     BILATERALLY    Pulmonary hypertension     Severe tricuspid regurgitation     Thyroid disease     only has half a thyroid       Past Surgical History:   Procedure Laterality Date    LEFT HEART CATHETERIZATION Left 2022    Procedure: Left heart cath;  Surgeon: Kee Magana MD;  Location: [facility] CATH;  Service: Cardiovascular;  Laterality: Left;    RIGHT HEART CATHETERIZATION Right 2022    Procedure: INSERTION, CATHETER, RIGHT HEART;  Surgeon: Kee Magana MD;  Location: [facility] CATH;  Service: Cardiovascular;  Laterality: Right;  + [place]    THYROIDECTOMY, PARTIAL         Review of patient's allergies indicates:   Allergen Reactions    Amoxicillin     Penicillins        Current Facility-Administered Medications   Medication    acetaminophen tablet 650 mg    furosemide (LASIX) 500 mg in 50 mL infusion (conc: 10 mg/mL)    [START ON 2023] levothyroxine tablet 175 mcg    LIDOcaine (PF) 10 mg/ml (1%) injection    pregabalin capsule 50 mg    sodium chloride 0.9% bolus    sodium chloride 0.9% flush 10 mL     Family History       Problem Relation (Age of Onset)    Cancer Maternal Grandfather    No Known Problems Mother, Father    Ovarian cancer Maternal Grandmother          Tobacco Use    Smoking status: Former     Packs/day: 1.00     Years: 5.00     Pack years: 5.00     Types: Cigarettes     Quit date:      Years since quittin.0    Smokeless tobacco: Never   Substance and Sexual Activity    Alcohol use: Not Currently     Comment: seldom    Drug use: Never    Sexual activity: Yes     Partners: Male     Birth control/protection: OCP     Review of Systems  See above HPI  Objective:     Vital Signs (Most Recent):  Temp: 98 °F (36.7 °C) (23)  Pulse: 97 (23)  Resp: 18 (23)  BP: 135/75 (23 112)  SpO2: 98 % (23) Vital Signs (24h Range):  Temp:  [98 °F (36.7  °C)-98.1 °F (36.7 °C)] 98 °F (36.7 °C)  Pulse:  [90-97] 97  Resp:  [18] 18  SpO2:  [97 %-98 %] 98 %  BP: (117-135)/(69-75) 135/75     Patient Vitals for the past 72 hrs (Last 3 readings):   Weight   02/01/23 1128 89.8 kg (198 lb)   02/01/23 0912 89.9 kg (198 lb 3.1 oz)     Body mass index is 38.67 kg/m².    No intake or output data in the 24 hours ending 02/01/23 1442    Physical Exam  Constitutional: no acute distress  HEENT: NCAT, EOMI, no scleral icterus  Cardiovascular: Regular rate and rhythm, no murmurs appreciated. 2+ carotid, radial, DP pulses bilaterally    Pulmonary: Clear to auscultation bilaterally   Abdomen: nontender, non-distended   Neuro: alert and oriented, no focal deficits  Extremities: warm, no edema   MSK: no deformities  Integument: intact, no rashes     Significant Labs:  CBC:  Recent Labs   Lab 02/01/23  0810   WBC 6.62   RBC 5.10   HGB 11.4*   HCT 37.4      MCV 73*   MCH 22.4*   MCHC 30.5*     BNP:  No results for input(s): BNP in the last 168 hours.    Invalid input(s): BNPTRIAGELBLO  CMP:  Recent Labs   Lab 02/01/23  0810   GLU 85   CALCIUM 8.5*   ALBUMIN 3.0*   PROT 6.6      K 3.4*   CO2 18*      BUN 16   CREATININE 0.9   ALKPHOS 108   ALT 48*   AST 39   BILITOT 1.1*      Coagulation:   No results for input(s): PT, INR, APTT in the last 168 hours.  LDH:  No results for input(s): LDH in the last 72 hours.  Microbiology:  Microbiology Results (last 7 days)       ** No results found for the last 168 hours. **            I have reviewed all pertinent labs within the past 24 hours.    Diagnostic Results:  I have reviewed all pertinent imaging results/findings within the past 24 hours.

## 2023-02-01 NOTE — H&P
[facility], [place]  H&P  Heart Failure/Transplant     Mya Sarmiento  YOB: 1990  Medical Record Number:  [id number]  Attending Physician:  Danny Clark MD   Date of Admission: 2/1/2023       Hospital Day:  0  Current Principal Problem:  <principal problem not specified>      History     Cc: pulmonary hypertension     HPI  Ms Mya Sarmiento is a 32 year old female with PMH of pulmonary hypertension, suspected WHO group I. She presents today for repeat right heart catheterization prior to initiation of vasodilator therapy.       Medications - Outpatient  Prior to Admission medications    Medication Sig Start Date End Date Taking? Authorizing Provider   furosemide (LASIX) 20 MG tablet TAKE 1 TABLET BY MOUTH EVERY DAY 1/17/23  Yes Ashanti Turner MD   levothyroxine (SYNTHROID, LEVOTHROID) 175 MCG tablet Take 175 mcg by mouth once daily. 12/15/22  Yes Historical Provider   norgestimate-ethinyl estradioL (ORTHO TRI-CYCLEN,TRI-SPRINTEC) 0.18/0.215/0.25 mg-35 mcg (28) tablet Take 1 tablet by mouth once daily. 1/9/23  Yes Ashanti Turner MD   pregabalin (LYRICA) 50 MG capsule Take 50 mg by mouth every evening. 12/23/20  Yes Historical Provider         Medications - Current  Scheduled Meds:  Continuous Infusions:  PRN Meds:.      Allergies  Review of patient's allergies indicates:   Allergen Reactions    Amoxicillin     Penicillins          Past Medical History  Past Medical History:   Diagnosis Date    Morbidly obese     Neuropathy     FEET    Ovarian cyst     BILATERALLY    Pulmonary hypertension     Severe tricuspid regurgitation     Thyroid disease     only has half a thyroid         Past Surgical History  Past Surgical History:   Procedure Laterality Date    LEFT HEART CATHETERIZATION Left 12/1/2022    Procedure: Left heart cath;  Surgeon: Kee Magana MD;  [facility];  Service: Cardiovascular;  Laterality: Left;    RIGHT HEART CATHETERIZATION Right 12/1/2022     Procedure: INSERTION, CATHETER, RIGHT HEART;  Surgeon: Kee Magana MD;  Location: [facility] CATH;  Service: Cardiovascular;  Laterality: Right;  + [place]    THYROIDECTOMY, PARTIAL           Social History  Social History     Socioeconomic History    Marital status:    Tobacco Use    Smoking status: Former     Packs/day: 1.00     Years: 5.00     Pack years: 5.00     Types: Cigarettes     Quit date:      Years since quittin.0    Smokeless tobacco: Never   Substance and Sexual Activity    Alcohol use: Not Currently     Comment: seldom    Drug use: Never    Sexual activity: Yes     Partners: Male     Birth control/protection: OCP         ROS  10 point ROS performed and negative except as stated in HPI     Physical Examination         Vital Signs  Vitals  Temp: 98.1 °F (36.7 °C)  Temp src: Temporal  Pulse: 90  Heart Rate Source: Monitor  Resp: 18  SpO2: 97 %  BP: 121/75  MAP (mmHg): 94  BP Location: Left arm  BP Method: Automatic  Patient Position: Lying          24 Hour VS Range    Temp:  [98.1 °F (36.7 °C)]   Pulse:  [90]   Resp:  [18]   BP: (117-121)/(69-75)   SpO2:  [97 %]   No intake or output data in the 24 hours ending 23 0955      Physical Exam:   Constitutional: no acute distress  HEENT: NCAT, EOMI, no scleral icterus  Cardiovascular: Regular rate and rhythm, no murmurs appreciated. 2+ carotid, radial, DP pulses bilaterally    Pulmonary: Clear to auscultation bilaterally   Abdomen: nontender, non-distended   Neuro: alert and oriented, no focal deficits  Extremities: warm, no edema   MSK: no deformities  Integument: intact, no rashes       Data       Recent Labs   Lab 23  0810   WBC 6.62   HGB 11.4*   HCT 37.4           No results for input(s): PROTIME, INR in the last 168 hours.     No results for input(s): NA, K, CL, CO2, BUN, CREATININE, ANIONGAP, CALCIUM in the last 168 hours.     No results for input(s): PROT, ALBUMIN, BILITOT, ALKPHOS, AST, ALT in the last 168 hours.      No results for input(s): TROPONINI in the last 168 hours.     BNP (pg/mL)   Date Value   01/10/2023 235 (H)       No results for input(s): LABBLOO in the last 168 hours.         Assessment & Plan   32 year old female with PMH of pulmonary hypertension, suspected WHO group I. Here for right heart cath prior to initiation of medications.     Pulmonary hypertension:   Suspected WHO group I. Here today for right heart cath. Risks/benefits/alternatives discussed with patient and she agrees to proceed. Consents signed.   -To cath lab for diagnostic RHC     Theodore Montanez MD  [facility]   Cardiovascular Disease PGY-V

## 2023-02-01 NOTE — H&P
[facility] - Cardiology Stepdown  Heart Transplant  H&P    Patient Name: Mya Sarmiento  MRN: [id number]  Admission Date: 2/1/2023  Attending Physician: Joshua Mayen Jr.,*  Primary Care Provider: Primary Doctor No  Principal Problem:Acute on chronic congestive heart failure with right ventricular diastolic dysfunction    Subjective:     History of Present Illness:  Mrs. Sarmiento has a history of thyroid disease, obesity, and severe PAH by RHC performed on 12/1/2022. Has a smoking history - quit 7 years ago. Was diagnosed with ovarian cysts following c/o pain in L side. Had tests done pre-procedure with ECHO concerning for severe PAH, followed by L/RHC that was consistent with severe PAH and no CAD noted. Other tests include negative V/Q and normal CXR. CT and PFTs have been completed but need to obtain.     Mrs. Sarmiento reports being generally healthy. She has lost 80 lbs over the last year with diet and exercise. She exercises 3x a week for 30 minutes to an hour doing bike, swim, stepper, hula hoop. Reports taking lasix 20 mg daily. It does make her go to the bathroom. Has noticed some fluid in her legs. Endorses neuropathy in her feet. Denies SOB, RAMIREZ, cough, dizziness, pre-syncope, syncope. Reports not experiencing symptoms or knowing there was a problem until testing was done in October. Has some L sided pain with ovarian cysts, but is not activity limiting. Denies hx of heart disease or clotting disorder, illicit drug use, diet pills, CTD, or autoimmune disorders. Denies concerning family history. She has a 10 year-old son (uneventful birth) and 19 year-old stepson.    She is being admitted after RHC showed: RA: 27/ 27/ 24 RV: 94/ 8/ 24 PA: 94/ 40/ 58 PWP: 10/ 27/ 12 . Cardiac output was 4.8  by Tesfaye. Cardiac index is 2.6 L/min/m2.   O2 Sat: PA 62%. Pulmonary vascular resistance: 9.6 CRAWFORD.   Condition 2: Peter at 20 ppm.  PA 97/39 (62)  PCWP 15, 27 (14)      Condition 3 Peter 40 ppm.  PA 96/37 (61)  PCWP 15, 21  (14)    Condition 4: Peter 80 ppm.  PA 96/38 (61)  PCWP 15/20 (14)  CO 5.5 l/min CI 3 l/min/m2    Plan is to admit for diuresis and discuss therapy for PH      Past Medical History:   Diagnosis Date    Morbidly obese     Neuropathy     FEET    Ovarian cyst     BILATERALLY    Pulmonary hypertension     Severe tricuspid regurgitation     Thyroid disease     only has half a thyroid       Past Surgical History:   Procedure Laterality Date    LEFT HEART CATHETERIZATION Left 2022    Procedure: Left heart cath;  Surgeon: Kee Magana MD;  Location: [facility] CATH;  Service: Cardiovascular;  Laterality: Left;    RIGHT HEART CATHETERIZATION Right 2022    Procedure: INSERTION, CATHETER, RIGHT HEART;  Surgeon: Kee Magana MD;  Location: [facility] CATH;  Service: Cardiovascular;  Laterality: Right;  + [place]    THYROIDECTOMY, PARTIAL         Review of patient's allergies indicates:   Allergen Reactions    Amoxicillin     Penicillins        Current Facility-Administered Medications   Medication    acetaminophen tablet 650 mg    furosemide (LASIX) 500 mg in 50 mL infusion (conc: 10 mg/mL)    [START ON 2023] levothyroxine tablet 175 mcg    LIDOcaine (PF) 10 mg/ml (1%) injection    pregabalin capsule 50 mg    sodium chloride 0.9% bolus    sodium chloride 0.9% flush 10 mL     Family History       Problem Relation (Age of Onset)    Cancer Maternal Grandfather    No Known Problems Mother, Father    Ovarian cancer Maternal Grandmother          Tobacco Use    Smoking status: Former     Packs/day: 1.00     Years: 5.00     Pack years: 5.00     Types: Cigarettes     Quit date:      Years since quittin.0    Smokeless tobacco: Never   Substance and Sexual Activity    Alcohol use: Not Currently     Comment: seldom    Drug use: Never    Sexual activity: Yes     Partners: Male     Birth control/protection: OCP     Review of Systems  See above HPI  Objective:     Vital Signs (Most Recent):  Temp: 98 °F  (36.7 °C) (02/01/23 1128)  Pulse: 97 (02/01/23 1128)  Resp: 18 (02/01/23 1128)  BP: 135/75 (02/01/23 1128)  SpO2: 98 % (02/01/23 1128) Vital Signs (24h Range):  Temp:  [98 °F (36.7 °C)-98.1 °F (36.7 °C)] 98 °F (36.7 °C)  Pulse:  [90-97] 97  Resp:  [18] 18  SpO2:  [97 %-98 %] 98 %  BP: (117-135)/(69-75) 135/75     Patient Vitals for the past 72 hrs (Last 3 readings):   Weight   02/01/23 1128 89.8 kg (198 lb)   02/01/23 0912 89.9 kg (198 lb 3.1 oz)     Body mass index is 38.67 kg/m².    No intake or output data in the 24 hours ending 02/01/23 1442    Physical Exam  Constitutional: no acute distress  HEENT: NCAT, EOMI, no scleral icterus  Cardiovascular: Regular rate and rhythm, no murmurs appreciated. 2+ carotid, radial, DP pulses bilaterally    Pulmonary: Clear to auscultation bilaterally   Abdomen: nontender, non-distended   Neuro: alert and oriented, no focal deficits  Extremities: warm, no edema   MSK: no deformities  Integument: intact, no rashes     Significant Labs:  CBC:  Recent Labs   Lab 02/01/23  0810   WBC 6.62   RBC 5.10   HGB 11.4*   HCT 37.4      MCV 73*   MCH 22.4*   MCHC 30.5*     BNP:  No results for input(s): BNP in the last 168 hours.    Invalid input(s): BNPTRIAGELBLO  CMP:  Recent Labs   Lab 02/01/23  0810   GLU 85   CALCIUM 8.5*   ALBUMIN 3.0*   PROT 6.6      K 3.4*   CO2 18*      BUN 16   CREATININE 0.9   ALKPHOS 108   ALT 48*   AST 39   BILITOT 1.1*      Coagulation:   No results for input(s): PT, INR, APTT in the last 168 hours.  LDH:  No results for input(s): LDH in the last 72 hours.  Microbiology:  Microbiology Results (last 7 days)       ** No results found for the last 168 hours. **            I have reviewed all pertinent labs within the past 24 hours.    Diagnostic Results:  I have reviewed all pertinent imaging results/findings within the past 24 hours.    Assessment/Plan:     * Acute on chronic congestive heart failure with right ventricular diastolic  dysfunction  -RV failure in setting of pulmonary HTN  -Last 2D Echo 2/1/23: LVEF 60%, LVEDD 3.64 cm  -Hypervolemic on examination today  -RHC: 2/1/23: RA: 27/ 27/ 24 RV: 94/ 8/ 24 PA: 94/ 40/ 58 PWP: 10/ 27/ 12 . Cardiac output was 4.8  by Tesfaye. Cardiac index is 2.6 L/min/m2.   -Will diuresis with Lasix infusion at 20mg/hr after 80mg IVP  -2g Na dietary restriction, 1500 mL fluid restriction, strict I/Os      Pulmonary hypertension  -RHC: 2/1/23: RA: 27/ 27/ 24 RV: 94/ 8/ 24 PA: 94/ 40/ 58 PWP: 10/ 27/ 12 . Cardiac output was 4.8  by Tesfaye. Cardiac index is 2.6 L/min/m2.   -outside testing done:  Had tests done pre-procedure with ECHO concerning for severe PAH, followed by L/RHC that was consistent with severe PAH and no CAD noted. Other tests include negative V/Q and normal CXR. CT and PFTs have been completed but need to obtain.  -[facility] does not have records of CT and PFT's so will find out from patient where they were obtained  -will discuss therapy options with staff and PH coordinators once patient is more euvolemic      Thyroid disease  -continue home dose of Synthroid          NAYELI Wetzel  Heart Transplant  [facility] - Cardiology Stepdown

## 2023-02-01 NOTE — ASSESSMENT & PLAN NOTE
-RHC: 2/1/23: RA: 27/ 27/ 24 RV: 94/ 8/ 24 PA: 94/ 40/ 58 PWP: 10/ 27/ 12 . Cardiac output was 4.8  by Tesfaye. Cardiac index is 2.6 L/min/m2.   -outside testing done:  Had tests done pre-procedure with ECHO concerning for severe PAH, followed by L/RHC that was consistent with severe PAH and no CAD noted. Other tests include negative V/Q and normal CXR. CT and PFTs have been completed but need to obtain.  -will discuss therapy options with staff and PH coordinators once patient is more euvolemic

## 2023-02-01 NOTE — Clinical Note
The PA catheter was repositioned to the main pulmonary artery. Hemodynamics were performed. Nitric Oxide 40 ppm

## 2023-02-01 NOTE — Clinical Note
The PA catheter was repositioned to the pulmonary wedge. Hemodynamics were performed. Nitric Oxide 20 ppm

## 2023-02-01 NOTE — Clinical Note
The PA catheter was reinserted into the RPW. Hemodynamics were performed. O2 saturation was measured. Nitric Oxide 80 ppm  CO = 5.50 CI = 2.96

## 2023-02-01 NOTE — DISCHARGE INSTRUCTIONS

## 2023-02-01 NOTE — Clinical Note
The PA catheter was repositioned to the right pulmonary artery. Hemodynamics were performed. O2 saturation was measured at 62%. CO = 4.83   CI =2.60An angiography was performed.

## 2023-02-01 NOTE — PLAN OF CARE
Received report from CLAUDY Mays. Patient s/p RHC, AAOx3. VSS, no c/o pain or discomfort at this time, resp even and unlabored. Gauze/tegaderm dressing to R neck is CDI. No active bleeding. No hematoma noted. Post procedure protocol reviewed with patient and patient's family. Understanding verbalized. Family members at bedside. Nurse call bell within reach. Will continue to monitor per post procedure protocol.

## 2023-02-02 ENCOUNTER — TELEPHONE (OUTPATIENT)
Dept: TRANSPLANT | Facility: CLINIC | Age: 33
End: 2023-02-02
Payer: COMMERCIAL

## 2023-02-02 LAB
ALBUMIN SERPL BCP-MCNC: 3.4 G/DL (ref 3.5–5.2)
ALP SERPL-CCNC: 113 U/L (ref 55–135)
ALT SERPL W/O P-5'-P-CCNC: 64 U/L (ref 10–44)
ANION GAP SERPL CALC-SCNC: 16 MMOL/L (ref 8–16)
AST SERPL-CCNC: 54 U/L (ref 10–40)
BASOPHILS # BLD AUTO: 0.05 K/UL (ref 0–0.2)
BASOPHILS NFR BLD: 0.7 % (ref 0–1.9)
BILIRUB SERPL-MCNC: 1.5 MG/DL (ref 0.1–1)
BUN SERPL-MCNC: 13 MG/DL (ref 6–20)
CALCIUM SERPL-MCNC: 8.8 MG/DL (ref 8.7–10.5)
CHLORIDE SERPL-SCNC: 102 MMOL/L (ref 95–110)
CO2 SERPL-SCNC: 19 MMOL/L (ref 23–29)
CREAT SERPL-MCNC: 0.9 MG/DL (ref 0.5–1.4)
DIFFERENTIAL METHOD: ABNORMAL
EOSINOPHIL # BLD AUTO: 0.1 K/UL (ref 0–0.5)
EOSINOPHIL NFR BLD: 0.8 % (ref 0–8)
ERYTHROCYTE [DISTWIDTH] IN BLOOD BY AUTOMATED COUNT: 18.5 % (ref 11.5–14.5)
EST. GFR  (NO RACE VARIABLE): >60 ML/MIN/1.73 M^2
GLUCOSE SERPL-MCNC: 84 MG/DL (ref 70–110)
HCT VFR BLD AUTO: 40.7 % (ref 37–48.5)
HGB BLD-MCNC: 12 G/DL (ref 12–16)
IMM GRANULOCYTES # BLD AUTO: 0.02 K/UL (ref 0–0.04)
IMM GRANULOCYTES NFR BLD AUTO: 0.3 % (ref 0–0.5)
LYMPHOCYTES # BLD AUTO: 0.8 K/UL (ref 1–4.8)
LYMPHOCYTES NFR BLD: 10.9 % (ref 18–48)
MAGNESIUM SERPL-MCNC: 1.9 MG/DL (ref 1.6–2.6)
MCH RBC QN AUTO: 21.5 PG (ref 27–31)
MCHC RBC AUTO-ENTMCNC: 29.5 G/DL (ref 32–36)
MCV RBC AUTO: 73 FL (ref 82–98)
MONOCYTES # BLD AUTO: 0.6 K/UL (ref 0.3–1)
MONOCYTES NFR BLD: 7.9 % (ref 4–15)
NEUTROPHILS # BLD AUTO: 6 K/UL (ref 1.8–7.7)
NEUTROPHILS NFR BLD: 79.4 % (ref 38–73)
NRBC BLD-RTO: 0 /100 WBC
PLATELET # BLD AUTO: 294 K/UL (ref 150–450)
PMV BLD AUTO: 11.7 FL (ref 9.2–12.9)
POTASSIUM SERPL-SCNC: 4.7 MMOL/L (ref 3.5–5.1)
PROT SERPL-MCNC: 7.5 G/DL (ref 6–8.4)
RBC # BLD AUTO: 5.58 M/UL (ref 4–5.4)
SODIUM SERPL-SCNC: 137 MMOL/L (ref 136–145)
WBC # BLD AUTO: 7.5 K/UL (ref 3.9–12.7)

## 2023-02-02 PROCEDURE — 99233 SBSQ HOSP IP/OBS HIGH 50: CPT | Mod: ,,, | Performed by: INTERNAL MEDICINE

## 2023-02-02 PROCEDURE — 93010 EKG 12-LEAD: ICD-10-PCS | Mod: ,,, | Performed by: INTERNAL MEDICINE

## 2023-02-02 PROCEDURE — 36415 COLL VENOUS BLD VENIPUNCTURE: CPT | Performed by: PHYSICIAN ASSISTANT

## 2023-02-02 PROCEDURE — 93010 ELECTROCARDIOGRAM REPORT: CPT | Mod: ,,, | Performed by: INTERNAL MEDICINE

## 2023-02-02 PROCEDURE — 25000003 PHARM REV CODE 250: Performed by: PHYSICIAN ASSISTANT

## 2023-02-02 PROCEDURE — 80053 COMPREHEN METABOLIC PANEL: CPT | Performed by: PHYSICIAN ASSISTANT

## 2023-02-02 PROCEDURE — 93005 ELECTROCARDIOGRAM TRACING: CPT

## 2023-02-02 PROCEDURE — 83735 ASSAY OF MAGNESIUM: CPT | Performed by: PHYSICIAN ASSISTANT

## 2023-02-02 PROCEDURE — 20600001 HC STEP DOWN PRIVATE ROOM

## 2023-02-02 PROCEDURE — 25000003 PHARM REV CODE 250: Performed by: STUDENT IN AN ORGANIZED HEALTH CARE EDUCATION/TRAINING PROGRAM

## 2023-02-02 PROCEDURE — 99233 PR SUBSEQUENT HOSPITAL CARE,LEVL III: ICD-10-PCS | Mod: ,,, | Performed by: INTERNAL MEDICINE

## 2023-02-02 PROCEDURE — 85025 COMPLETE CBC W/AUTO DIFF WBC: CPT | Performed by: PHYSICIAN ASSISTANT

## 2023-02-02 RX ADMIN — FUROSEMIDE 60 MG: 40 TABLET ORAL at 06:02

## 2023-02-02 RX ADMIN — PREGABALIN 50 MG: 50 CAPSULE ORAL at 10:02

## 2023-02-02 RX ADMIN — POTASSIUM CHLORIDE 40 MEQ: 1500 TABLET, EXTENDED RELEASE ORAL at 02:02

## 2023-02-02 RX ADMIN — LEVOTHYROXINE SODIUM 175 MCG: 150 TABLET ORAL at 05:02

## 2023-02-02 NOTE — ASSESSMENT & PLAN NOTE
-Admitted from cath lab with RHC: 2/1/23: RA: 27/ 27/ 24 RV: 94/ 8/ 24 PA: 94/ 40/ 58 PWP: 10/ 27/ 12 . Cardiac output was 4.8  by Tesfaye. Cardiac index is 2.6 L/min/m2  -Stared on IV Lasix gtt at 20 mg/hr  -Net -ve 7.2 L/24 hours. Stable Cr. Will continue the same and plan to transition to PO diuretics tomorrow.   -Outside testing done:  Had tests done pre-procedure with ECHO concerning for severe PAH, followed by L/RHC that was consistent with severe PAH and no CAD noted. Other tests include negative V/Q and normal CXR. CT and PFTs completed at [facility] in [place], will request records  -PH coordinators discussed possible PH therapies and outpatient follow up

## 2023-02-02 NOTE — PROGRESS NOTES
[facility] - [facility]iology Step[facility]  Heart Transplant  Progress Note    Patient Name: Mya Sarmiento  MRN: [id number]  Admission Date: 2/1/2023  Hospital Length of Stay: 1 days  Attending Physician: Joshua Mayen Jr.,*  Primary Care Provider: Primary Doctor No  Principal Problem:Acute on chronic congestive heart failure with right ventricular diastolic dysfunction    Subjective:     Interval History: Admitted yesterday for volume overload after RHC showed RA 24, PA 94/40 (58), W 12, CO 4.8, CI 2.1. Placed on IV Lasix gtt at 20 mg/hr. Net -ve 7.2 L since admit. Will continue to diurese with plans to discharge tomorrow on PO regimen. PH coordinator to discuss possible PH therapies and outpatient follow up.         Continuous Infusions:   furosemide (LASIX) 10 mg/mL infusion (non-titrating) 20 mg/hr (02/01/23 1409)    sodium chloride 0.9%       Scheduled Meds:   levothyroxine  175 mcg Oral Before breakfast    pregabalin  50 mg Oral QHS     PRN Meds:acetaminophen, LIDOcaine (PF) 10 mg/ml (1%), sodium chloride 0.9%, sodium chloride 0.9%    Review of patient's allergies indicates:   Allergen Reactions    Amoxicillin     Penicillins      Objective:     Vital Signs (Most Recent):  Temp: 97 °F (36.1 °C) (02/02/23 1414)  Pulse: 80 (02/02/23 1414)  Resp: 18 (02/02/23 1414)  BP: 126/75 (02/02/23 1414)  SpO2: 97 % (02/02/23 1414) Vital Signs (24h Range):  Temp:  [96.5 °F (35.8 °C)-97.8 °F (36.6 °C)] 97 °F (36.1 °C)  Pulse:  [77-86] 80  Resp:  [15-20] 18  SpO2:  [95 %-100 %] 97 %  BP: (112-126)/(67-75) 126/75     Patient Vitals for the past 72 hrs (Last 3 readings):   Weight   02/02/23 0515 83.5 kg (184 lb 1.4 oz)   02/01/23 1128 89.8 kg (198 lb)   02/01/23 0912 89.9 kg (198 lb 3.1 oz)     Body mass index is 35.95 kg/m².      Intake/Output Summary (Last 24 hours) at 2/2/2023 1415  Last data filed at 2/2/2023 1000  Gross per 24 hour   Intake 300 ml   Output 8450 ml   Net -8150 ml       Hemodynamic Parameters:            Physical Exam  Vitals and nursing note reviewed.   HENT:      Head: Normocephalic and atraumatic.   Cardiovascular:      Rate and Rhythm: Normal rate and regular rhythm.   Pulmonary:      Effort: Pulmonary effort is normal.      Breath sounds: Normal breath sounds.   Abdominal:      General: Abdomen is flat.      Tenderness: There is no abdominal tenderness.   Musculoskeletal:         General: Normal range of motion.      Cervical back: Normal range of motion.      Right lower leg: No edema.      Left lower leg: No edema.   Skin:     General: Skin is warm.      Capillary Refill: Capillary refill takes less than 2 seconds.   Neurological:      General: No focal deficit present.      Mental Status: She is alert.   Psychiatric:         Mood and Affect: Mood normal.         Behavior: Behavior normal.         Thought Content: Thought content normal.         Judgment: Judgment normal.       Significant Labs:  CBC:  Recent Labs   Lab 02/01/23  0810 02/02/23  0351   WBC 6.62 7.50   RBC 5.10 5.58*   HGB 11.4* 12.0   HCT 37.4 40.7    294   MCV 73* 73*   MCH 22.4* 21.5*   MCHC 30.5* 29.5*     BNP:  No results for input(s): BNP in the last 168 hours.    Invalid input(s): BNPTRIAGELBLO  CMP:  Recent Labs   Lab 02/01/23  0810 02/01/23  1825 02/02/23 0351   GLU 85 102 84   CALCIUM 8.5* 8.6* 8.8   ALBUMIN 3.0*  --  3.4*   PROT 6.6  --  7.5    136 137   K 3.4* 3.3* 4.7   CO2 18* 19* 19*    105 102   BUN 16 15 13   CREATININE 0.9 0.9 0.9   ALKPHOS 108  --  113   ALT 48*  --  64*   AST 39  --  54*   BILITOT 1.1*  --  1.5*      Coagulation:   No results for input(s): PT, INR, APTT in the last 168 hours.  LDH:  No results for input(s): LDH in the last 72 hours.  Microbiology:  Microbiology Results (last 7 days)       ** No results found for the last 168 hours. **            I have reviewed all pertinent labs within the past 24 hours.    Estimated Creatinine Clearance: 86 mL/min (based on SCr of 0.9  mg/dL).    Diagnostic Results:  I have reviewed all pertinent imaging results/findings within the past 24 hours.    Assessment and Plan:     Mrs. Sarmiento has a history of thyroid disease, obesity, and severe PAH by RHC performed on 12/1/2022. Has a smoking history - quit 7 years ago. Was diagnosed with ovarian cysts following c/o pain in L side. Had tests done pre-procedure with ECHO concerning for severe PAH, followed by L/RHC that was consistent with severe PAH and no CAD noted. Other tests include negative V/Q and normal CXR. CT and PFTs have been completed but need to obtain.     Mrs. Sarmiento reports being generally healthy. She has lost 80 lbs over the last year with diet and exercise. She exercises 3x a week for 30 minutes to an hour doing bike, swim, stepper, hula hoop. Reports taking lasix 20 mg daily. It does make her go to the bathroom. Has noticed some fluid in her legs. Endorses neuropathy in her feet. Denies SOB, RAMIREZ, cough, dizziness, pre-syncope, syncope. Reports not experiencing symptoms or knowing there was a problem until testing was done in October. Has some L sided pain with ovarian cysts, but is not activity limiting. Denies hx of heart disease or clotting disorder, illicit drug use, diet pills, CTD, or autoimmune disorders. Denies concerning family history. She has a 10 year-old son (uneventful birth) and 19 year-old stepson.    She is being admitted after RHC showed: RA: 27/ 27/ 24 RV: 94/ 8/ 24 PA: 94/ 40/ 58 PWP: 10/ 27/ 12 . Cardiac output was 4.8  by Tesfaye. Cardiac index is 2.6 L/min/m2.   O2 Sat: PA 62%. Pulmonary vascular resistance: 9.6 CRAWFORD.   Condition 2: Peter at 20 ppm.  PA 97/39 (62)  PCWP 15, 27 (14)      Condition 3 Peter 40 ppm.  PA 96/37 (61)  PCWP 15, 21 (14)    Condition 4: Peter 80 ppm.  PA 96/38 (61)  PCWP 15/20 (14)  CO 5.5 l/min CI 3 l/min/m2    Plan is to admit for diuresis and discuss therapy for PH      * Acute on chronic congestive heart failure with right ventricular diastolic  dysfunction  -RV failure in setting of pulmonary HTN  -Last 2D Echo 2/1/23: LVEF 60%, LVEDD 3.64 cm  -Hypervolemic on examination today  -RHC: 2/1/23: RA: 27/ 27/ 24 RV: 94/ 8/ 24 PA: 94/ 40/ 58 PWP: 10/ 27/ 12 . Cardiac output was 4.8  by Tesfaye. Cardiac index is 2.6 L/min/m2.   -Diuresed with  20mg/hr after 80mg IVP, will continue   -2g Na dietary restriction, 1500 mL fluid restriction, strict I/Os      Thyroid disease  -continue home dose of Synthroid      Pulmonary hypertension  -Admitted from cath lab with RHC: 2/1/23: RA: 27/ 27/ 24 RV: 94/ 8/ 24 PA: 94/ 40/ 58 PWP: 10/ 27/ 12 . Cardiac output was 4.8  by Tesfaye. Cardiac index is 2.6 L/min/m2  -Stared on IV Lasix gtt at 20 mg/hr  -Net -ve 7.2 L/24 hours. Stable Cr. Will continue the same and plan to transition to PO diuretics tomorrow.   -Outside testing done:  Had tests done pre-procedure with ECHO concerning for severe PAH, followed by L/RHC that was consistent with severe PAH and no CAD noted. Other tests include negative V/Q and normal CXR. CT and PFTs completed at [facility] in [place], will request records  -PH coordinators discussed possible PH therapies and outpatient follow up      Jesenia Dutton PA-C  Heart Transplant  [facility] - Cardiology Step[facility]

## 2023-02-02 NOTE — ASSESSMENT & PLAN NOTE
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 Cardiac output was 4.8  by Tesfaye. Cardiac index is 2.6 L/min/m2.   -Diuresed with  20mg/hr after 80mg IVP, will continue   -2g Na dietary restriction, 1500 mL fluid restriction, strict I/Os

## 2023-02-02 NOTE — TELEPHONE ENCOUNTER
"Nurse navigator went to speak with patient in regards to PH education and medication education (mainly SC remodulin). Patient states that she was very overwhelmed with all the information she has received since [facility]. She was made to feel like she "had a death sentence." Patient states that she is very open to all treatment including SC remodulin (it was mentioned to her already) but would like to leave and come back for start since she has a child at home and needs to make arrangements. Patient was very receptive to all education on PH medications. Will speak with PH team to determine what orals meds will be used moving forward although SC remodulin seems to be a definite and patient was agreeable to SC remodulin. So education was done on remodulin.    Discussed the role of Specialty Pharmacy and the Specialty Pharmacy RN. Explained that Specialty Pharmacy RN will make initial visit at Hospital when patient first starts the medication. The RN will provide education on medication, dosing and titrating. Patient will have to return (if discharged) for admission and be started on IV but would transition to SC on day of discharge. Admission is usually for 3-4 days and depends on patient tolerance.     Patient provided with Remodulin Medication guide. Discussed possible side effects of medication, most commonly site pain/HA/nausea, rash, jaw pain, neuropathy and low blood pressure. Advised patient to make sure that no one touch cassette. Patient must keep site clean and dry. Pt was urged to contact office (pt has direct phone number) should she have any SEs that are concerning to her whatsoever.      Patient is also aware of 4 hour half life and must report any problems with pumps/supplies/shipment to prevent missing doses. If problem cannot be solved than patient must come to Main campus. Patient is aware of the importance of ordering medication on time, being available for teaching/calls/shipments. Remodulin is a 24/7 " infusion that should not be interrupted.  at bedside and states that he will be her back up and will learn about remodulin.       Pt verbalized understanding of all. All questions/concerns answered/addressed to patient satisfaction.

## 2023-02-02 NOTE — SUBJECTIVE & OBJECTIVE
Interval History: Admitted yesterday for volume overload after RHC showed RA 24, PA 94/40 (58), W 12, CO 4.8, CI 2.1. Placed on IV Lasix gtt at 20 mg/hr. Net -ve 7.2 L since admit. Will continue to diurese with plans to discharge tomorrow on PO regimen. PH coordinator to discuss possible PH therapies and outpatient follow up.         Continuous Infusions:   furosemide (LASIX) 10 mg/mL infusion (non-titrating) 20 mg/hr (02/01/23 1409)    sodium chloride 0.9%       Scheduled Meds:   levothyroxine  175 mcg Oral Before breakfast    pregabalin  50 mg Oral QHS     PRN Meds:acetaminophen, LIDOcaine (PF) 10 mg/ml (1%), sodium chloride 0.9%, sodium chloride 0.9%    Review of patient's allergies indicates:   Allergen Reactions    Amoxicillin     Penicillins      Objective:     Vital Signs (Most Recent):  Temp: 97 °F (36.1 °C) (02/02/23 1414)  Pulse: 80 (02/02/23 1414)  Resp: 18 (02/02/23 1414)  BP: 126/75 (02/02/23 1414)  SpO2: 97 % (02/02/23 1414) Vital Signs (24h Range):  Temp:  [96.5 °F (35.8 °C)-97.8 °F (36.6 °C)] 97 °F (36.1 °C)  Pulse:  [77-86] 80  Resp:  [15-20] 18  SpO2:  [95 %-100 %] 97 %  BP: (112-126)/(67-75) 126/75     Patient Vitals for the past 72 hrs (Last 3 readings):   Weight   02/02/23 0515 83.5 kg (184 lb 1.4 oz)   02/01/23 1128 89.8 kg (198 lb)   02/01/23 0912 89.9 kg (198 lb 3.1 oz)     Body mass index is 35.95 kg/m².      Intake/Output Summary (Last 24 hours) at 2/2/2023 1415  Last data filed at 2/2/2023 1000  Gross per 24 hour   Intake 300 ml   Output 8450 ml   Net -8150 ml       Hemodynamic Parameters:           Physical Exam  Vitals and nursing note reviewed.   HENT:      Head: Normocephalic and atraumatic.   Cardiovascular:      Rate and Rhythm: Normal rate and regular rhythm.   Pulmonary:      Effort: Pulmonary effort is normal.      Breath sounds: Normal breath sounds.   Abdominal:      General: Abdomen is flat.      Tenderness: There is no abdominal tenderness.   Musculoskeletal:         General:  Normal range of motion.      Cervical back: Normal range of motion.      Right lower leg: No edema.      Left lower leg: No edema.   Skin:     General: Skin is warm.      Capillary Refill: Capillary refill takes less than 2 seconds.   Neurological:      General: No focal deficit present.      Mental Status: She is alert.   Psychiatric:         Mood and Affect: Mood normal.         Behavior: Behavior normal.         Thought Content: Thought content normal.         Judgment: Judgment normal.       Significant Labs:  CBC:  Recent Labs   Lab 02/01/23  0810 02/02/23  0351   WBC 6.62 7.50   RBC 5.10 5.58*   HGB 11.4* 12.0   HCT 37.4 40.7    294   MCV 73* 73*   MCH 22.4* 21.5*   MCHC 30.5* 29.5*     BNP:  No results for input(s): BNP in the last 168 hours.    Invalid input(s): BNPTRIAGELBLO  CMP:  Recent Labs   Lab 02/01/23  0810 02/01/23  1825 02/02/23  0351   GLU 85 102 84   CALCIUM 8.5* 8.6* 8.8   ALBUMIN 3.0*  --  3.4*   PROT 6.6  --  7.5    136 137   K 3.4* 3.3* 4.7   CO2 18* 19* 19*    105 102   BUN 16 15 13   CREATININE 0.9 0.9 0.9   ALKPHOS 108  --  113   ALT 48*  --  64*   AST 39  --  54*   BILITOT 1.1*  --  1.5*      Coagulation:   No results for input(s): PT, INR, APTT in the last 168 hours.  LDH:  No results for input(s): LDH in the last 72 hours.  Microbiology:  Microbiology Results (last 7 days)       ** No results found for the last 168 hours. **            I have reviewed all pertinent labs within the past 24 hours.    Estimated Creatinine Clearance: 86 mL/min (based on SCr of 0.9 mg/dL).    Diagnostic Results:  I have reviewed all pertinent imaging results/findings within the past 24 hours.

## 2023-02-02 NOTE — PROGRESS NOTES
Admit /Discharge Note     Met with patient and  to assess needs. Patient is a 32 y.o.  (12 years) female, admitted for  PH .      Patient admitted on 2/1/2023 .  At this time, patient presents as alert and oriented x 4, pleasant, good eye contact, recall good, concentration/judgement good, calm, communicative, cooperative, and asking and answering questions appropriately.  At this time, patients caregiver presents as alert and oriented x 4, pleasant, good eye contact, recall good, concentration/judgement good, calm, communicative, cooperative, and asking and answering questions appropriately.    Household/Family Systems     Patient resides with patient's  and child(patricio), age(s) 10 and 19 , at       [address] [place] [address] [address]  [place] [address].          Support system includes:    Jeremi Sarmiento () [phone]        Patient does have dependents that are in need of being cared for. Patient's 10 year old son are being cared for by step son who is 19 years old at home.     Patients primary caregiver is herself.  Confirmed patients contact information is [phone] (home);   Telephone Information:   Mobile [phone]   Mobile Not on file.   .    During admission, patient's caregiver plans to stay in patient's room.  Confirmed patient and patients caregivers do have access to reliable transportation.    Cognitive Status/Learning     Patient reports reading ability as 12th grade and states patient does not have difficulty with reading, writing, seeing, hearing, comprehension, learning, and memory.  Patient reports patient learns best by watching.   Needed: N/A.   Highest education level: High School (9-12) or GED    Vocation/Disability   .  Working for Income: No  If no, reason not working: Patient Choice - Homemaker  Patient's  works full time as a      Adherence     Patient reports a high level of adherence to patients health care regimen.  Adherence  counseling and education provided. Patient verbalizes understanding.    Substance Use    Patient reports the following substance usage.    Tobacco: none, patient denies any use. Quit 7 years ago.   Alcohol: none, patient denies any use.  Illicit Drugs/Non-prescribed Medications: none, patient denies any use. Pt denied HX of IV drug use.   Patient states clear understanding of the potential impact of substance use.  Substance abstinence/cessation counseling, education and resources provided and reviewed.     Services Utilizing/ADLS    Infusion Service: Prior to admission, patient utilizing? no  Home Health: Prior to admission, patient utilizing? no  DME: Prior to admission, no  Pulmonary/Cardiac Rehab: Prior to admission, no  Dialysis:  Prior to admission, no  Transplant Specialty Pharmacy:  Prior to admission, no.    Prior to admission, patient reports patient was independent with ADLS and was not driving.  Patient reports patient is able to care for self at this time..  Patient indicates a willingness to care for self once medically cleared to do so.    Insurance/Medications    Insured by   Payer/Plan Subscr  Sex Relation Sub. Ins. ID Effective Group Num   1. [facility]* MANOHAR BUTLER 1981 Male Spouse [id number]                                   [address] [address]      Primary Insurance (for UNOS reporting): Private Insurance  Secondary Insurance (for UNOS reporting): None    Patient reports patient is able to obtain and afford medications at this time and at time of discharge.    Living Will/Healthcare Power of     Patient states patient does not have a LW and/or HCPA. Pt indicates she would like her  to make her decisions if she cannot.   provided education regarding LW and HCPA and the completion of forms.     Coping/Mental Health    Patient is coping adequately with the aid of  family members and friends.  Patient denies mental health difficulties.     Discharge  Planning    At time of discharge, patient plans to return to patient's home under the care of herself.  Patients  will transport patient.  Per rounds today, expected discharge date is today or tomorrow . Patient and patients caregiver  verbalize understanding and are involved in treatment planning and discharge process.    Additional Concerns    Patient's caretaker denies additional needs and/or concerns at this time. Patient is being followed for needs, education, resources, information, emotional support, supportive counseling, and for supportive and skilled discharge plan of care.  providing ongoing psychosocial support, education, resources and d/c planning as needed.  SW remains available.  provided resource list, patient choice, psychosocial and supportive counseling, resources, education, assistance and discharge planning with patient and caregiver involvement, ongoing SW availability and services as appropriate.  remains available. Patient's caregiver verbalizes understanding and agreement with information reviewed,  availability and how to access available resources as needed. Patient denies additional needs and/or concerns at this time. Patient verbalizes understanding and agreement with information reviewed, social work availability, and how to access available resources as needed.

## 2023-02-03 VITALS
SYSTOLIC BLOOD PRESSURE: 113 MMHG | DIASTOLIC BLOOD PRESSURE: 79 MMHG | RESPIRATION RATE: 18 BRPM | HEART RATE: 73 BPM | WEIGHT: 179.88 LBS | BODY MASS INDEX: 35.31 KG/M2 | TEMPERATURE: 98 F | OXYGEN SATURATION: 97 % | HEIGHT: 60 IN

## 2023-02-03 LAB
ALBUMIN SERPL BCP-MCNC: 3 G/DL (ref 3.5–5.2)
ALP SERPL-CCNC: 100 U/L (ref 55–135)
ALT SERPL W/O P-5'-P-CCNC: 69 U/L (ref 10–44)
ANION GAP SERPL CALC-SCNC: 15 MMOL/L (ref 8–16)
AST SERPL-CCNC: 55 U/L (ref 10–40)
BASOPHILS # BLD AUTO: 0.06 K/UL (ref 0–0.2)
BASOPHILS NFR BLD: 0.9 % (ref 0–1.9)
BILIRUB SERPL-MCNC: 1.2 MG/DL (ref 0.1–1)
BUN SERPL-MCNC: 14 MG/DL (ref 6–20)
CALCIUM SERPL-MCNC: 8.4 MG/DL (ref 8.7–10.5)
CHLORIDE SERPL-SCNC: 100 MMOL/L (ref 95–110)
CO2 SERPL-SCNC: 20 MMOL/L (ref 23–29)
CREAT SERPL-MCNC: 1 MG/DL (ref 0.5–1.4)
DIFFERENTIAL METHOD: ABNORMAL
EOSINOPHIL # BLD AUTO: 0.2 K/UL (ref 0–0.5)
EOSINOPHIL NFR BLD: 2.3 % (ref 0–8)
ERYTHROCYTE [DISTWIDTH] IN BLOOD BY AUTOMATED COUNT: 17.2 % (ref 11.5–14.5)
EST. GFR  (NO RACE VARIABLE): >60 ML/MIN/1.73 M^2
GLUCOSE SERPL-MCNC: 77 MG/DL (ref 70–110)
HCT VFR BLD AUTO: 36.3 % (ref 37–48.5)
HGB BLD-MCNC: 11 G/DL (ref 12–16)
IMM GRANULOCYTES # BLD AUTO: 0.02 K/UL (ref 0–0.04)
IMM GRANULOCYTES NFR BLD AUTO: 0.3 % (ref 0–0.5)
LYMPHOCYTES # BLD AUTO: 1.1 K/UL (ref 1–4.8)
LYMPHOCYTES NFR BLD: 16.2 % (ref 18–48)
MAGNESIUM SERPL-MCNC: 2 MG/DL (ref 1.6–2.6)
MCH RBC QN AUTO: 21.7 PG (ref 27–31)
MCHC RBC AUTO-ENTMCNC: 30.3 G/DL (ref 32–36)
MCV RBC AUTO: 72 FL (ref 82–98)
MONOCYTES # BLD AUTO: 0.7 K/UL (ref 0.3–1)
MONOCYTES NFR BLD: 10.2 % (ref 4–15)
NEUTROPHILS # BLD AUTO: 4.9 K/UL (ref 1.8–7.7)
NEUTROPHILS NFR BLD: 70.1 % (ref 38–73)
NRBC BLD-RTO: 0 /100 WBC
PLATELET # BLD AUTO: 309 K/UL (ref 150–450)
PMV BLD AUTO: 12.6 FL (ref 9.2–12.9)
POTASSIUM SERPL-SCNC: 3.9 MMOL/L (ref 3.5–5.1)
PROT SERPL-MCNC: 6.5 G/DL (ref 6–8.4)
RBC # BLD AUTO: 5.08 M/UL (ref 4–5.4)
SODIUM SERPL-SCNC: 135 MMOL/L (ref 136–145)
WBC # BLD AUTO: 7.04 K/UL (ref 3.9–12.7)

## 2023-02-03 PROCEDURE — 99233 SBSQ HOSP IP/OBS HIGH 50: CPT | Mod: ,,, | Performed by: PHYSICIAN ASSISTANT

## 2023-02-03 PROCEDURE — 25000003 PHARM REV CODE 250: Performed by: PHYSICIAN ASSISTANT

## 2023-02-03 PROCEDURE — 83735 ASSAY OF MAGNESIUM: CPT | Performed by: PHYSICIAN ASSISTANT

## 2023-02-03 PROCEDURE — 80053 COMPREHEN METABOLIC PANEL: CPT | Performed by: PHYSICIAN ASSISTANT

## 2023-02-03 PROCEDURE — 36415 COLL VENOUS BLD VENIPUNCTURE: CPT | Performed by: PHYSICIAN ASSISTANT

## 2023-02-03 PROCEDURE — 99233 PR SUBSEQUENT HOSPITAL CARE,LEVL III: ICD-10-PCS | Mod: ,,, | Performed by: PHYSICIAN ASSISTANT

## 2023-02-03 PROCEDURE — 85025 COMPLETE CBC W/AUTO DIFF WBC: CPT | Performed by: PHYSICIAN ASSISTANT

## 2023-02-03 RX ORDER — SPIRONOLACTONE 25 MG/1
25 TABLET ORAL DAILY
Status: DISCONTINUED | OUTPATIENT
Start: 2023-02-04 | End: 2023-02-03 | Stop reason: HOSPADM

## 2023-02-03 RX ORDER — SPIRONOLACTONE 25 MG/1
25 TABLET ORAL DAILY
Qty: 30 TABLET | Refills: 11 | Status: SHIPPED | OUTPATIENT
Start: 2023-02-04 | End: 2024-01-26 | Stop reason: SDUPTHER

## 2023-02-03 RX ORDER — FUROSEMIDE 40 MG/1
40 TABLET ORAL DAILY
Status: DISCONTINUED | OUTPATIENT
Start: 2023-02-04 | End: 2023-02-03 | Stop reason: HOSPADM

## 2023-02-03 RX ORDER — FUROSEMIDE 40 MG/1
40 TABLET ORAL DAILY
Qty: 30 TABLET | Refills: 11 | Status: SHIPPED | OUTPATIENT
Start: 2023-02-04 | End: 2024-01-26 | Stop reason: SDUPTHER

## 2023-02-03 RX ADMIN — LEVOTHYROXINE SODIUM 175 MCG: 150 TABLET ORAL at 05:02

## 2023-02-03 RX ADMIN — FUROSEMIDE 60 MG: 40 TABLET ORAL at 08:02

## 2023-02-03 NOTE — ASSESSMENT & PLAN NOTE
-Admitted from cath lab with RHC: 2/1/23: RA: 27/ 27/ 24 RV: 94/ 8/ 24 PA: 94/ 40/ 58 PWP: 10/ 27/ 12 . Cardiac output was 4.8  by Tesfaye. Cardiac index is 2.6 L/min/m2  -Stared on IV Lasix gtt at 20 mg/hr  -Net -ve 7.2 L/24 hours. Stable Cr. Will continue the same and plan to transition to PO diuretics tomorrow.   -Outside testing done:  Had tests done pre-procedure with ECHO concerning for severe PAH, followed by L/RHC that was consistent with severe PAH and no CAD noted. Other tests include negative V/Q and normal CXR. CT and PFTs completed at [facility] in [place], will request records  -PH coordinators discussed possible PH therapies and outpatient follow up

## 2023-02-03 NOTE — SUBJECTIVE & OBJECTIVE
Interval History: No acute events. Patient tolerated switching to PO diuretics (will be on 40 mg lasix daily). Discharging home today.     Continuous Infusions:   sodium chloride 0.9%       Scheduled Meds:   [START ON 2/4/2023] furosemide  40 mg Oral Daily    levothyroxine  175 mcg Oral Before breakfast    pregabalin  50 mg Oral QHS     PRN Meds:acetaminophen, LIDOcaine (PF) 10 mg/ml (1%), sodium chloride 0.9%, sodium chloride 0.9%    Review of patient's allergies indicates:   Allergen Reactions    Amoxicillin     Penicillins      Objective:     Vital Signs (Most Recent):  Temp: 98.1 °F (36.7 °C) (02/03/23 1208)  Pulse: 75 (02/03/23 1208)  Resp: 18 (02/03/23 1208)  BP: 110/69 (02/03/23 1208)  SpO2: 96 % (02/03/23 1208) Vital Signs (24h Range):  Temp:  [97 °F (36.1 °C)-98.7 °F (37.1 °C)] 98.1 °F (36.7 °C)  Pulse:  [75-90] 75  Resp:  [18-20] 18  SpO2:  [93 %-99 %] 96 %  BP: (105-126)/(65-77) 110/69     Patient Vitals for the past 72 hrs (Last 3 readings):   Weight   02/03/23 0543 81.6 kg (179 lb 14.3 oz)   02/02/23 0515 83.5 kg (184 lb 1.4 oz)   02/01/23 1128 89.8 kg (198 lb)       Body mass index is 35.13 kg/m².      Intake/Output Summary (Last 24 hours) at 2/3/2023 1350  Last data filed at 2/3/2023 0555  Gross per 24 hour   Intake 1020 ml   Output 3155 ml   Net -2135 ml         Hemodynamic Parameters:           Physical Exam  Vitals and nursing note reviewed.   HENT:      Head: Normocephalic and atraumatic.      Nose: Nose normal.   Eyes:      Conjunctiva/sclera: Conjunctivae normal.   Cardiovascular:      Rate and Rhythm: Normal rate and regular rhythm.   Pulmonary:      Effort: Pulmonary effort is normal.      Breath sounds: Normal breath sounds.   Abdominal:      General: Abdomen is flat.      Tenderness: There is no abdominal tenderness.   Musculoskeletal:         General: Normal range of motion.      Cervical back: Normal range of motion.      Right lower leg: No edema.      Left lower leg: No edema.   Skin:      General: Skin is warm.      Capillary Refill: Capillary refill takes less than 2 seconds.   Neurological:      General: No focal deficit present.      Mental Status: She is alert.   Psychiatric:         Mood and Affect: Mood normal.         Behavior: Behavior normal.         Thought Content: Thought content normal.         Judgment: Judgment normal.       Significant Labs:  CBC:  Recent Labs   Lab 02/01/23  0810 02/02/23 0351 02/03/23 0319   WBC 6.62 7.50 7.04   RBC 5.10 5.58* 5.08   HGB 11.4* 12.0 11.0*   HCT 37.4 40.7 36.3*    294 309   MCV 73* 73* 72*   MCH 22.4* 21.5* 21.7*   MCHC 30.5* 29.5* 30.3*       BNP:  No results for input(s): BNP in the last 168 hours.    Invalid input(s): BNPTRIAGELBLO  CMP:  Recent Labs   Lab 02/01/23  0810 02/01/23  1825 02/02/23 0351 02/03/23 0319   GLU 85 102 84 77   CALCIUM 8.5* 8.6* 8.8 8.4*   ALBUMIN 3.0*  --  3.4* 3.0*   PROT 6.6  --  7.5 6.5    136 137 135*   K 3.4* 3.3* 4.7 3.9   CO2 18* 19* 19* 20*    105 102 100   BUN 16 15 13 14   CREATININE 0.9 0.9 0.9 1.0   ALKPHOS 108  --  113 100   ALT 48*  --  64* 69*   AST 39  --  54* 55*   BILITOT 1.1*  --  1.5* 1.2*        Coagulation:   No results for input(s): PT, INR, APTT in the last 168 hours.  LDH:  No results for input(s): LDH in the last 72 hours.  Microbiology:  Microbiology Results (last 7 days)       ** No results found for the last 168 hours. **            I have reviewed all pertinent labs within the past 24 hours.    Estimated Creatinine Clearance: 76.4 mL/min (based on SCr of 1 mg/dL).    Diagnostic Results:  I have reviewed all pertinent imaging results/findings within the past 24 hours.

## 2023-02-03 NOTE — PROGRESS NOTES
DISCHARGE    SW to pt's room for discharge today, caregivers not present.   Pt presents as AAO x4, calm, cooperative, and asking and answering questions appropriately.  Pt verbalizes understanding and agreement with plan for d/c to home today.  Pt reports her  will transport her home today.  Pt denies any d/c needs, and none identified by medical team.  Pt reports coping well at this time, and denies any needs or concerns to SW.  SW providing ongoing psychosocial and counseling support, education, resources, assistance, and discharge planning as indicated.  SW remains available.

## 2023-02-03 NOTE — PROGRESS NOTES
[facility] - [facility]iology Stepdown  Heart Transplant  Progress Note    Patient Name: Mya Sarmiento  MRN: [id number]  Admission Date: 2/1/2023  Hospital Length of Stay: 2 days  Attending Physician: Joshua Mayen Jr.,*  Primary Care Provider: Primary Doctor No  Principal Problem:Acute on chronic congestive heart failure with right ventricular diastolic dysfunction    Subjective:     Interval History: No acute events. Patient tolerated switching to PO diuretics (will be on 40 mg lasix daily). Discharging home today.     Continuous Infusions:   sodium chloride 0.9%       Scheduled Meds:   [START ON 2/4/2023] furosemide  40 mg Oral Daily    levothyroxine  175 mcg Oral Before breakfast    pregabalin  50 mg Oral QHS     PRN Meds:acetaminophen, LIDOcaine (PF) 10 mg/ml (1%), sodium chloride 0.9%, sodium chloride 0.9%    Review of patient's allergies indicates:   Allergen Reactions    Amoxicillin     Penicillins      Objective:     Vital Signs (Most Recent):  Temp: 98.1 °F (36.7 °C) (02/03/23 1208)  Pulse: 75 (02/03/23 1208)  Resp: 18 (02/03/23 1208)  BP: 110/69 (02/03/23 1208)  SpO2: 96 % (02/03/23 1208) Vital Signs (24h Range):  Temp:  [97 °F (36.1 °C)-98.7 °F (37.1 °C)] 98.1 °F (36.7 °C)  Pulse:  [75-90] 75  Resp:  [18-20] 18  SpO2:  [93 %-99 %] 96 %  BP: (105-126)/(65-77) 110/69     Patient Vitals for the past 72 hrs (Last 3 readings):   Weight   02/03/23 0543 81.6 kg (179 lb 14.3 oz)   02/02/23 0515 83.5 kg (184 lb 1.4 oz)   02/01/23 1128 89.8 kg (198 lb)       Body mass index is 35.13 kg/m².      Intake/Output Summary (Last 24 hours) at 2/3/2023 1350  Last data filed at 2/3/2023 0555  Gross per 24 hour   Intake 1020 ml   Output 3155 ml   Net -2135 ml         Hemodynamic Parameters:           Physical Exam  Vitals and nursing note reviewed.   HENT:      Head: Normocephalic and atraumatic.      Nose: Nose normal.   Eyes:      Conjunctiva/sclera: Conjunctivae normal.   Cardiovascular:      Rate and Rhythm:  Normal rate and regular rhythm.   Pulmonary:      Effort: Pulmonary effort is normal.      Breath sounds: Normal breath sounds.   Abdominal:      General: Abdomen is flat.      Tenderness: There is no abdominal tenderness.   Musculoskeletal:         General: Normal range of motion.      Cervical back: Normal range of motion.      Right lower leg: No edema.      Left lower leg: No edema.   Skin:     General: Skin is warm.      Capillary Refill: Capillary refill takes less than 2 seconds.   Neurological:      General: No focal deficit present.      Mental Status: She is alert.   Psychiatric:         Mood and Affect: Mood normal.         Behavior: Behavior normal.         Thought Content: Thought content normal.         Judgment: Judgment normal.       Significant Labs:  CBC:  Recent Labs   Lab 02/01/23  0810 02/02/23  0351 02/03/23 0319   WBC 6.62 7.50 7.04   RBC 5.10 5.58* 5.08   HGB 11.4* 12.0 11.0*   HCT 37.4 40.7 36.3*    294 309   MCV 73* 73* 72*   MCH 22.4* 21.5* 21.7*   MCHC 30.5* 29.5* 30.3*       BNP:  No results for input(s): BNP in the last 168 hours.    Invalid input(s): BNPTRIAGELBLO  CMP:  Recent Labs   Lab 02/01/23  0810 02/01/23  1825 02/02/23 0351 02/03/23 0319   GLU 85 102 84 77   CALCIUM 8.5* 8.6* 8.8 8.4*   ALBUMIN 3.0*  --  3.4* 3.0*   PROT 6.6  --  7.5 6.5    136 137 135*   K 3.4* 3.3* 4.7 3.9   CO2 18* 19* 19* 20*    105 102 100   BUN 16 15 13 14   CREATININE 0.9 0.9 0.9 1.0   ALKPHOS 108  --  113 100   ALT 48*  --  64* 69*   AST 39  --  54* 55*   BILITOT 1.1*  --  1.5* 1.2*        Coagulation:   No results for input(s): PT, INR, APTT in the last 168 hours.  LDH:  No results for input(s): LDH in the last 72 hours.  Microbiology:  Microbiology Results (last 7 days)       ** No results found for the last 168 hours. **            I have reviewed all pertinent labs within the past 24 hours.    Estimated Creatinine Clearance: 76.4 mL/min (based on SCr of 1 mg/dL).    Diagnostic  Results:  I have reviewed all pertinent imaging results/findings within the past 24 hours.    Assessment and Plan:     Mrs. Sarmiento has a history of thyroid disease, obesity, and severe PAH by RHC performed on 12/1/2022. Has a smoking history - quit 7 years ago. Was diagnosed with ovarian cysts following c/o pain in L side. Had tests done pre-procedure with ECHO concerning for severe PAH, followed by L/RHC that was consistent with severe PAH and no CAD noted. Other tests include negative V/Q and normal CXR. CT and PFTs have been completed but need to obtain.     Mrs. Sarmiento reports being generally healthy. She has lost 80 lbs over the last year with diet and exercise. She exercises 3x a week for 30 minutes to an hour doing bike, swim, stepper, hula hoop. Reports taking lasix 20 mg daily. It does make her go to the bathroom. Has noticed some fluid in her legs. Endorses neuropathy in her feet. Denies SOB, RAMIREZ, cough, dizziness, pre-syncope, syncope. Reports not experiencing symptoms or knowing there was a problem until testing was done in October. Has some L sided pain with ovarian cysts, but is not activity limiting. Denies hx of heart disease or clotting disorder, illicit drug use, diet pills, CTD, or autoimmune disorders. Denies concerning family history. She has a 10 year-old son (uneventful birth) and 19 year-old stepson.    She is being admitted after RHC showed: RA: 27/ 27/ 24 RV: 94/ 8/ 24 PA: 94/ 40/ 58 PWP: 10/ 27/ 12 . Cardiac output was 4.8  by Tesfaye. Cardiac index is 2.6 L/min/m2.   O2 Sat: PA 62%. Pulmonary vascular resistance: 9.6 CRAWFORD.   Condition 2: Peter at 20 ppm.  PA 97/39 (62)  PCWP 15, 27 (14)      Condition 3 Peter 40 ppm.  PA 96/37 (61)  PCWP 15, 21 (14)    Condition 4: Peter 80 ppm.  PA 96/38 (61)  PCWP 15/20 (14)  CO 5.5 l/min CI 3 l/min/m2    Plan is to admit for diuresis and discuss therapy for PH       Below is her PFT report from OSH     Received reports from outside provider -      PFTs read by   David Hull                    * Acute on chronic congestive heart failure with right ventricular diastolic dysfunction  -RV failure in setting of pulmonary HTN  -Last 2D Echo 2/1/23: LVEF 60%, LVEDD 3.64 cm  -Hypervolemic on examination today  -RHC: 2/1/23: RA: 27/ 27/ 24 RV: 94/ 8/ 24 PA: 94/ 40/ 58 PWP: 10/ 27/ 12 . Cardiac output was 4.8  by Tesfaye. Cardiac index is 2.6 L/min/m2.   -Diuresed with 20mg/hr after 80mg IVP, since transitioned to lasix 40 daily.   -2g Na dietary restriction, 1500 mL fluid restriction, strict I/Os      Thyroid disease  -continue home dose of Synthroid      Pulmonary hypertension  -Admitted from cath lab with RHC: 2/1/23: RA: 27/ 27/ 24 RV: 94/ 8/ 24 PA: 94/ 40/ 58 PWP: 10/ 27/ 12 . Cardiac output was 4.8  by Tesfaye. Cardiac index is 2.6 L/min/m2  -Stared on IV Lasix gtt at 20 mg/hr  -Net -ve 7.2 L/24 hours. Stable Cr. Will continue the same and plan to transition to PO diuretics tomorrow.   -Outside testing done:  Had tests done pre-procedure with ECHO concerning for severe PAH, followed by L/RHC that was consistent with severe PAH and no CAD noted. Other tests include negative V/Q and normal CXR. CT and PFTs completed at [facility] in [place], will request records  -PH coordinators discussed possible PH therapies and outpatient follow up        Raymond Hammonds PA-C  Heart Transplant  [facility] - Cardiology Stepdown

## 2023-02-03 NOTE — ASSESSMENT & PLAN NOTE
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 Cardiac output was 4.8  by Tesfaye. Cardiac index is 2.6 L/min/m2.   -Diuresed with 20mg/hr after 80mg IVP, since transitioned to lasix 40 daily.   -2g Na dietary restriction, 1500 mL fluid restriction, strict I/Os

## 2023-02-04 DIAGNOSIS — I27.21 PULMONARY ARTERIAL HYPERTENSION: Primary | ICD-10-CM

## 2023-02-04 NOTE — DISCHARGE SUMMARY
[facility] - [facility]iology Stepdown  Heart Transplant  Discharge Summary      Patient Name: Mya Sarmiento  MRN: [id number]  Admission Date: 2/1/2023  Hospital Length of Stay: 2 days  Discharge Date and Time: 02/04/2023 2:56 PM  Attending Physician: Carlota att. providers found   Discharging Provider: Raymond Hammonds PA-C  Primary Care Provider: Primary Doctor Carlota     HPI: Mrs. Sarmiento has a history of thyroid disease, obesity, and severe PAH by RHC performed on 12/1/2022. Has a smoking history - quit 7 years ago. Was diagnosed with ovarian cysts following c/o pain in L side. Had tests done pre-procedure with ECHO concerning for severe PAH, followed by L/RHC that was consistent with severe PAH and no CAD noted. Other tests include negative V/Q and normal CXR. CT and PFTs have been completed but need to obtain.     Mrs. Sarmiento reports being generally healthy. She has lost 80 lbs over the last year with diet and exercise. She exercises 3x a week for 30 minutes to an hour doing bike, swim, stepper, hula hoop. Reports taking lasix 20 mg daily. It does make her go to the bathroom. Has noticed some fluid in her legs. Endorses neuropathy in her feet. Denies SOB, RAMIREZ, cough, dizziness, pre-syncope, syncope. Reports not experiencing symptoms or knowing there was a problem until testing was done in October. Has some L sided pain with ovarian cysts, but is not activity limiting. Denies hx of heart disease or clotting disorder, illicit drug use, diet pills, CTD, or autoimmune disorders. Denies concerning family history. She has a 10 year-old son (uneventful birth) and 19 year-old stepson.    She is being admitted after RHC showed: RA: 27/ 27/ 24 RV: 94/ 8/ 24 PA: 94/ 40/ 58 PWP: 10/ 27/ 12 . Cardiac output was 4.8  by Tesfaye. Cardiac index is 2.6 L/min/m2.   O2 Sat: PA 62%. Pulmonary vascular resistance: 9.6 CRAWFORD.   Condition 2: Peter at 20 ppm.  PA 97/39 (62)  PCWP 15, 27 (14)      Condition 3 Peter 40 ppm.  PA 96/37 (61)  PCWP 15, 21 (14)    Condition  4: Peter 80 ppm.  PA 96/38 (61)  PCWP 15/20 (14)  CO 5.5 l/min CI 3 l/min/m2    Plan is to admit for diuresis and discuss therapy for PH      Procedure(s) (LRB):  INSERTION, CATHETER, RIGHT HEART (Right)     Hospital Course: Ms Sarmiento was admitted 2/1 after RHC  showed volume overload (RA of 24 PA 94/40) PCWP 12. She diuresed well on lasix (total net -jody 10.8L). Ultimately reached euvolemic weight of 81.6 kg. She is being discharged on 25 of aldactone and 40 mg of PO lasix daily. Will be starting PO PH meds as outpt. Will be following up in clinic in 1-2 weeks with labs this Monday. Potentially coming back to start parenteral therapy for PH.       Goals of Care Treatment Preferences:  Code Status: Full Code          Significant Diagnostic Studies: Labs: All labs within the past 24 hours have been reviewed    Pending Diagnostic Studies:     Procedure Component Value Units Date/Time    EKG 12-LEAD [[id number]]     Order Status: Sent Lab Status: No result         Final Active Diagnoses:    Diagnosis Date Noted POA    PRINCIPAL PROBLEM:  Acute on chronic congestive heart failure with right ventricular diastolic dysfunction [I50.82, I50.33] 02/01/2023 Unknown    Thyroid disease [E07.9]  Yes    Pulmonary hypertension [I27.20] 12/01/2022 Yes      Problems Resolved During this Admission:      Discharged Condition: stable    Disposition: Home or Self Care    Follow Up:    Patient Instructions:   No discharge procedures on file.  Medications:  Reconciled Home Medications:      Medication List      START taking these medications    spironolactone 25 MG tablet  Commonly known as: ALDACTONE  Take 1 tablet (25 mg total) by mouth once daily.        CHANGE how you take these medications    furosemide 40 MG tablet  Commonly known as: LASIX  Take 1 tablet (40 mg total) by mouth once daily.  What changed:   · medication strength  · how much to take  · when to take this        CONTINUE taking these medications    levothyroxine 175 MCG  tablet  Commonly known as: SYNTHROID, LEVOTHROID  Take 175 mcg by mouth once daily.     norgestimate-ethinyl estradioL 0.18/0.215/0.25 mg-35 mcg (28) tablet  Commonly known as: ORTHO TRI-CYCLEN,TRI-SPRINTEC  Take 1 tablet by mouth once daily.     pregabalin 50 MG capsule  Commonly known as: LYRICA  Take 50 mg by mouth every evening.            Raymond Hammonds PA-C  Heart Transplant  [facility] - Cardiology Stepdown

## 2023-02-04 NOTE — HOSPITAL COURSE
Ms Sarmiento was admitted 2/1 after RHC  showed volume overload (RA of 24 PA 94/40) PCWP 12. She diuresed well on lasix (total net -jody 10.8L). Ultimately reached euvolemic weight of 81.6 kg. She is being discharged on 25 of aldactone and 40 mg of PO lasix daily. Will be starting PO PH meds as outpt. Will be following up in clinic in 1-2 weeks with labs this Monday. Potentially coming back to start parenteral therapy for PH.

## 2023-02-10 ENCOUNTER — TELEPHONE (OUTPATIENT)
Dept: TRANSPLANT | Facility: CLINIC | Age: 33
End: 2023-02-10
Payer: COMMERCIAL

## 2023-02-10 NOTE — TELEPHONE ENCOUNTER
Opsumit 10 mg Voucher receipt [facility]  Shipped 30 days supply on 2/10/2023

## 2023-02-13 ENCOUNTER — TELEPHONE (OUTPATIENT)
Dept: TRANSPLANT | Facility: CLINIC | Age: 33
End: 2023-02-13
Payer: COMMERCIAL

## 2023-02-13 NOTE — TELEPHONE ENCOUNTER
Patient called in to inform nurse navigator that she received her opsumit. Nurse navigator told her to start taking the medication and labs would need to be drawn in one month to check CBC. Patient will also need monthly pregnancy tests. Remunity is still in process. Patient verbalized understanding.

## 2023-02-14 DIAGNOSIS — Z79.899 POLYPHARMACY: Primary | ICD-10-CM

## 2023-02-14 DIAGNOSIS — Z32.00 ENCOUNTER FOR PREGNANCY TEST, RESULT UNKNOWN: ICD-10-CM

## 2023-03-07 ENCOUNTER — TELEPHONE (OUTPATIENT)
Dept: TRANSPLANT | Facility: CLINIC | Age: 33
End: 2023-03-07
Payer: COMMERCIAL

## 2023-03-07 NOTE — TELEPHONE ENCOUNTER
NN spoke to patient to discuss when she will be admitted to start her Remodulin. Patient states that [facility] Nurse [place] is going to be coming to her house on Wednesday and Thursday this week.  Tentatively, discussion of admission for 3/14/2023. Advised patient that we will be communicating through the patient portal for updating planned admission.  Patient acknowledged.

## 2023-03-08 ENCOUNTER — TELEPHONE (OUTPATIENT)
Dept: TRANSPLANT | Facility: CLINIC | Age: 33
End: 2023-03-08
Payer: COMMERCIAL

## 2023-03-08 NOTE — TELEPHONE ENCOUNTER
NN contacted patient to discuss the planned admission to initiate Remodulin medication.  Advised patient that we will have to not only get approval for her medication, we would have to make sure that she is covered for admission, among other things.  Also advised that since everything is not in place yet, we will have to now plan for admission the week of 3/28/2023.  Patient acknowledged.

## 2023-03-10 ENCOUNTER — TELEPHONE (OUTPATIENT)
Dept: TRANSPLANT | Facility: CLINIC | Age: 33
End: 2023-03-10
Payer: COMMERCIAL

## 2023-03-10 NOTE — TELEPHONE ENCOUNTER
Note from Specialty Pharmacy Nurse - [facility]:  Receivd 3/9/2023  Remunity Pre-Teach Day 1 completed in person for patient Mya Sarmiento : 1990 on 2023.     Patient and Jeremi Sarmiento () are progressing well.     Will plan to see them , for more Remunity Education.     And   Received 3/10/2023  Remunity Pre-Teach Day 2 completed in person for patient Mya Sarmiento : 1990 on .     Patient had errands to run this evening, so we completed 1 Remunity Pre-Teach Visit today.     Patient and Jeremi Sarmiento () are doing a great job during the practice sessions and continue to progress well.     They are not independent but are able to demonstrate technique.     One more Pre-Teach Day should have Patient and  Independent.     Aubrey will take over from here.

## 2023-03-13 ENCOUNTER — LAB VISIT (OUTPATIENT)
Dept: LAB | Facility: HOSPITAL | Age: 33
End: 2023-03-13
Payer: COMMERCIAL

## 2023-03-13 DIAGNOSIS — Z32.00 ENCOUNTER FOR PREGNANCY TEST, RESULT UNKNOWN: ICD-10-CM

## 2023-03-13 DIAGNOSIS — Z79.899 POLYPHARMACY: ICD-10-CM

## 2023-03-13 LAB
BASOPHILS # BLD AUTO: 0.07 K/UL (ref 0–0.2)
BASOPHILS NFR BLD: 0.7 % (ref 0–1.9)
DIFFERENTIAL METHOD: ABNORMAL
EOSINOPHIL # BLD AUTO: 0.5 K/UL (ref 0–0.5)
EOSINOPHIL NFR BLD: 4.6 % (ref 0–8)
ERYTHROCYTE [DISTWIDTH] IN BLOOD BY AUTOMATED COUNT: 21.6 % (ref 11.5–14.5)
HCG INTACT+B SERPL-ACNC: <1 MIU/ML
HCT VFR BLD AUTO: 38.4 % (ref 37–48.5)
HGB BLD-MCNC: 11.8 G/DL (ref 12–16)
IMM GRANULOCYTES # BLD AUTO: 0.02 K/UL (ref 0–0.04)
IMM GRANULOCYTES NFR BLD AUTO: 0.2 % (ref 0–0.5)
LYMPHOCYTES # BLD AUTO: 0.9 K/UL (ref 1–4.8)
LYMPHOCYTES NFR BLD: 8.8 % (ref 18–48)
MCH RBC QN AUTO: 22.3 PG (ref 27–31)
MCHC RBC AUTO-ENTMCNC: 30.7 G/DL (ref 32–36)
MCV RBC AUTO: 73 FL (ref 82–98)
MONOCYTES # BLD AUTO: 0.6 K/UL (ref 0.3–1)
MONOCYTES NFR BLD: 5.9 % (ref 4–15)
NEUTROPHILS # BLD AUTO: 7.9 K/UL (ref 1.8–7.7)
NEUTROPHILS NFR BLD: 79.8 % (ref 38–73)
NRBC BLD-RTO: 0 /100 WBC
PLATELET # BLD AUTO: 253 K/UL (ref 150–450)
PMV BLD AUTO: 11.7 FL (ref 9.2–12.9)
RBC # BLD AUTO: 5.28 M/UL (ref 4–5.4)
WBC # BLD AUTO: 9.87 K/UL (ref 3.9–12.7)

## 2023-03-13 PROCEDURE — 85025 COMPLETE CBC W/AUTO DIFF WBC: CPT

## 2023-03-13 PROCEDURE — 36415 COLL VENOUS BLD VENIPUNCTURE: CPT

## 2023-03-13 PROCEDURE — 84702 CHORIONIC GONADOTROPIN TEST: CPT

## 2023-03-22 ENCOUNTER — TELEPHONE (OUTPATIENT)
Dept: TRANSPLANT | Facility: CLINIC | Age: 33
End: 2023-03-22
Payer: COMMERCIAL

## 2023-03-22 NOTE — TELEPHONE ENCOUNTER
NN made reservation and entered orders for planned admission - IV Remodulin to SQ Remunity transition.  Admission is requested for 3/28/2023.  Patient, providers and specialty pharmacy have been notified.

## 2023-03-23 ENCOUNTER — TELEPHONE (OUTPATIENT)
Dept: TRANSPLANT | Facility: CLINIC | Age: 33
End: 2023-03-23
Payer: COMMERCIAL

## 2023-03-23 NOTE — TELEPHONE ENCOUNTER
NN contacted patient's insurance company to inquire about her Opsumit prior authorization submitted on 3/15/2023.  [facility] representative states that the patient has been approved for Opsumit until 09/16/2023 - Auth # [id number].

## 2023-03-28 ENCOUNTER — HOSPITAL ENCOUNTER (INPATIENT)
Facility: HOSPITAL | Age: 33
LOS: 3 days | Discharge: HOME | DRG: 316 | End: 2023-03-31
Attending: INTERNAL MEDICINE | Admitting: INTERNAL MEDICINE
Payer: COMMERCIAL

## 2023-03-28 ENCOUNTER — TELEPHONE (OUTPATIENT)
Dept: TRANSPLANT | Facility: CLINIC | Age: 33
End: 2023-03-28
Payer: COMMERCIAL

## 2023-03-28 DIAGNOSIS — I27.0 PRIMARY PULMONARY HYPERTENSION: ICD-10-CM

## 2023-03-28 DIAGNOSIS — I27.20 PULMONARY HYPERTENSION: Primary | ICD-10-CM

## 2023-03-28 PROCEDURE — 76937 US GUIDE VASCULAR ACCESS: CPT

## 2023-03-28 PROCEDURE — 25000003 PHARM REV CODE 250: Performed by: NURSE PRACTITIONER

## 2023-03-28 PROCEDURE — 36410 VNPNXR 3YR/> PHY/QHP DX/THER: CPT

## 2023-03-28 PROCEDURE — C1751 CATH, INF, PER/CENT/MIDLINE: HCPCS

## 2023-03-28 PROCEDURE — 25000003 PHARM REV CODE 250: Performed by: INTERNAL MEDICINE

## 2023-03-28 PROCEDURE — 20600001 HC STEP DOWN PRIVATE ROOM

## 2023-03-28 PROCEDURE — 63600175 PHARM REV CODE 636 W HCPCS: Mod: JG | Performed by: INTERNAL MEDICINE

## 2023-03-28 RX ORDER — TRAMADOL HYDROCHLORIDE 50 MG/1
50 TABLET ORAL EVERY 6 HOURS PRN
Status: DISCONTINUED | OUTPATIENT
Start: 2023-03-28 | End: 2023-03-31 | Stop reason: HOSPADM

## 2023-03-28 RX ORDER — FUROSEMIDE 40 MG/1
40 TABLET ORAL DAILY
Status: DISCONTINUED | OUTPATIENT
Start: 2023-03-29 | End: 2023-03-31 | Stop reason: HOSPADM

## 2023-03-28 RX ORDER — SODIUM CHLORIDE 0.9 % (FLUSH) 0.9 %
10 SYRINGE (ML) INJECTION
Status: DISCONTINUED | OUTPATIENT
Start: 2023-03-28 | End: 2023-03-31 | Stop reason: HOSPADM

## 2023-03-28 RX ORDER — ONDANSETRON 8 MG/1
8 TABLET, ORALLY DISINTEGRATING ORAL EVERY 8 HOURS PRN
Status: DISCONTINUED | OUTPATIENT
Start: 2023-03-28 | End: 2023-03-31 | Stop reason: HOSPADM

## 2023-03-28 RX ORDER — PREGABALIN 50 MG/1
50 CAPSULE ORAL NIGHTLY
Status: DISCONTINUED | OUTPATIENT
Start: 2023-03-28 | End: 2023-03-31 | Stop reason: HOSPADM

## 2023-03-28 RX ORDER — TALC
9 POWDER (GRAM) TOPICAL NIGHTLY
Status: DISCONTINUED | OUTPATIENT
Start: 2023-03-28 | End: 2023-03-31 | Stop reason: HOSPADM

## 2023-03-28 RX ORDER — LOPERAMIDE HYDROCHLORIDE 2 MG/1
2 CAPSULE ORAL EVERY 4 HOURS PRN
Status: DISCONTINUED | OUTPATIENT
Start: 2023-03-28 | End: 2023-03-31 | Stop reason: HOSPADM

## 2023-03-28 RX ORDER — ONDANSETRON 2 MG/ML
4 INJECTION INTRAMUSCULAR; INTRAVENOUS EVERY 8 HOURS PRN
Status: DISCONTINUED | OUTPATIENT
Start: 2023-03-28 | End: 2023-03-31 | Stop reason: HOSPADM

## 2023-03-28 RX ORDER — HYDROCODONE BITARTRATE AND ACETAMINOPHEN 5; 325 MG/1; MG/1
1 TABLET ORAL EVERY 6 HOURS PRN
Status: DISCONTINUED | OUTPATIENT
Start: 2023-03-28 | End: 2023-03-31 | Stop reason: HOSPADM

## 2023-03-28 RX ORDER — SPIRONOLACTONE 25 MG/1
25 TABLET ORAL DAILY
Status: DISCONTINUED | OUTPATIENT
Start: 2023-03-29 | End: 2023-03-31 | Stop reason: HOSPADM

## 2023-03-28 RX ORDER — NORGESTIMATE AND ETHINYL ESTRADIOL 7DAYSX3 28
1 KIT ORAL NIGHTLY
Status: DISCONTINUED | OUTPATIENT
Start: 2023-03-29 | End: 2023-03-31 | Stop reason: HOSPADM

## 2023-03-28 RX ORDER — BUTALBITAL, ACETAMINOPHEN AND CAFFEINE 50; 325; 40 MG/1; MG/1; MG/1
1 TABLET ORAL EVERY 4 HOURS PRN
Status: DISCONTINUED | OUTPATIENT
Start: 2023-03-28 | End: 2023-03-31 | Stop reason: HOSPADM

## 2023-03-28 RX ORDER — NORGESTIMATE AND ETHINYL ESTRADIOL 7DAYSX3 28
1 KIT ORAL DAILY
Status: DISCONTINUED | OUTPATIENT
Start: 2023-03-29 | End: 2023-03-28

## 2023-03-28 RX ADMIN — TREPROSTINIL 2 NG/KG/MIN: 200 INJECTION, SOLUTION INTRAVENOUS; SUBCUTANEOUS at 03:03

## 2023-03-28 RX ADMIN — MACITENTAN 10 MG: 10 TABLET, FILM COATED ORAL at 08:03

## 2023-03-28 RX ADMIN — PREGABALIN 50 MG: 50 CAPSULE ORAL at 08:03

## 2023-03-28 NOTE — CONSULTS
Single lumen 20G x 10CM midline placed left cephalic vein. Max dwell date 4/26/23, Lot# [id number].  Needle advanced into the vessel under real time ultrasound guidance.  Image recorded and saved.

## 2023-03-28 NOTE — SUBJECTIVE & OBJECTIVE
Past Medical History:   Diagnosis Date    Morbidly obese     Neuropathy     FEET    Ovarian cyst     BILATERALLY    Pulmonary hypertension     Severe tricuspid regurgitation     Thyroid disease     only has half a thyroid       Past Surgical History:   Procedure Laterality Date    LEFT HEART CATHETERIZATION Left 12/1/2022    Procedure: Left heart cath;  Surgeon: Kee Magana MD;  Location: [facility] CATH;  Service: Cardiovascular;  Laterality: Left;    RIGHT HEART CATHETERIZATION Right 12/1/2022    Procedure: INSERTION, CATHETER, RIGHT HEART;  Surgeon: Kee Magana MD;  Location: [facility] CATH;  Service: Cardiovascular;  Laterality: Right;  + [place]    RIGHT HEART CATHETERIZATION Right 2/1/2023    Procedure: INSERTION, CATHETER, RIGHT HEART;  Surgeon: Danny Clark MD;  Location: [facility] CATH LAB;  Service: Cardiology;  Laterality: Right;  with nitric oxide study    THYROIDECTOMY, PARTIAL       Review of patient's allergies indicates:   Allergen Reactions    Amoxicillin     Penicillins        Current Facility-Administered Medications   Medication    butalbital-acetaminophen-caffeine -40 mg per tablet 1 tablet    [START ON 3/29/2023] furosemide tablet 40 mg    HYDROcodone-acetaminophen 5-325 mg per tablet 1 tablet    [START ON 3/29/2023] levothyroxine tablet 175 mcg    loperamide capsule 2 mg    [START ON 3/29/2023] macitentan tablet 10 mg    melatonin tablet 9 mg    ondansetron disintegrating tablet 8 mg    ondansetron injection 4 mg    pregabalin capsule 50 mg    sodium chloride 0.9% flush 10 mL    [START ON 3/29/2023] spironolactone tablet 25 mg    traMADoL tablet 50 mg    treprostinil (REMODULIN) 3,000,000 ng in sodium chloride 0.9% 100 mL infusion    VELETRI/REMODULIN CASSETTE    VELETRI/REMODULIN TUBING     Family History       Problem Relation (Age of Onset)    Cancer Maternal Grandfather    No Known Problems Mother, Father    Ovarian cancer Maternal Grandmother          Tobacco Use    Smoking  status: Former     Packs/day: 1.00     Years: 5.00     Pack years: 5.00     Types: Cigarettes     Quit date:      Years since quittin.2    Smokeless tobacco: Never   Substance and Sexual Activity    Alcohol use: Not Currently     Comment: seldom    Drug use: Never    Sexual activity: Yes     Partners: Male     Birth control/protection: OCP     Review of Systems   Constitutional: Negative.    HENT: Negative.     Eyes: Negative.    Respiratory: Negative.     Cardiovascular: Negative.    Gastrointestinal: Negative.    Endocrine: Negative.    Genitourinary: Negative.    Musculoskeletal: Negative.    Skin: Negative.    Allergic/Immunologic: Negative.    Neurological: Negative.    Hematological: Negative.    Psychiatric/Behavioral: Negative.     Objective:     Vital Signs (Most Recent):  Temp: 98.2 °F (36.8 °C) (23 1147)  Pulse: 82 (23 1147)  Resp: 18 (23 1147)  BP: 112/62 (23 1147)  SpO2: 95 % (23 1147) Vital Signs (24h Range):  Temp:  [98.2 °F (36.8 °C)] 98.2 °F (36.8 °C)  Pulse:  [82] 82  Resp:  [18] 18  SpO2:  [95 %] 95 %  BP: (112)/(62) 112/62     Patient Vitals for the past 72 hrs (Last 3 readings):   Weight   23 1148 79.9 kg (176 lb 2.4 oz)     Body mass index is 34.4 kg/m².    No intake or output data in the 24 hours ending 23 1421    Physical Exam  Vitals and nursing note reviewed.   Constitutional:       Appearance: She is well-developed.   HENT:      Head: Normocephalic.   Eyes:      Pupils: Pupils are equal, round, and reactive to light.   Cardiovascular:      Rate and Rhythm: Normal rate and regular rhythm.   Pulmonary:      Effort: Pulmonary effort is normal.      Breath sounds: Normal breath sounds.   Abdominal:      General: Bowel sounds are normal.      Palpations: Abdomen is soft.   Musculoskeletal:         General: Normal range of motion.      Cervical back: Normal range of motion and neck supple.   Skin:     General: Skin is warm and dry.    Neurological:      Mental Status: She is alert and oriented to person, place, and time.   Psychiatric:         Behavior: Behavior normal.       Significant Labs:  CBC:  No results for input(s): WBC, RBC, HGB, HCT, PLT, MCV, MCH, MCHC in the last 168 hours.  BNP:  No results for input(s): BNP in the last 168 hours.    Invalid input(s): BNPTRIAGELBLO  CMP:  No results for input(s): GLU, CALCIUM, ALBUMIN, PROT, NA, K, CO2, CL, BUN, CREATININE, ALKPHOS, ALT, AST, BILITOT in the last 168 hours.   Coagulation:   No results for input(s): PT, INR, APTT in the last 168 hours.  LDH:  No results for input(s): LDH in the last 72 hours.  Microbiology:  Microbiology Results (last 7 days)       ** No results found for the last 168 hours. **            I have reviewed all pertinent labs within the past 24 hours.    Diagnostic Results:  I have reviewed all pertinent imaging results/findings within the past 24 hours.

## 2023-03-28 NOTE — TELEPHONE ENCOUNTER
NN contacted patient re: admission.  Advised that bed assignment has been made and can come to the hospital.  Directions to the admissions office given from the parking garage. Patient acknowledged.

## 2023-03-28 NOTE — ASSESSMENT & PLAN NOTE
-[facility] 1/23: RA 24 PA 94/40 (58),  PCWP 12, CO 4.8 l/min CI 2.l/min/m2  -Continue macitentan.   -Start IV remodulin per dosing sheet and up[titrate as tolerated. Will transition to SC @ D/C.

## 2023-03-28 NOTE — HPI
33 y/o female with thyroid disease, obesity, ovarian cysts, and severe PAH by RHC performed on 12/1/2022. L/RHC that was consistent with severe PAH and no CAD noted. She is being directly admitted for initiation/uptitration of IV remodulin. She will be transitioned to SC prior to D/C. Denies CP, palpitations, syncope, presyncope, fevers, n/v/d, PND, orthopnea.

## 2023-03-28 NOTE — H&P
[facility]  Heart Transplant  H&P    Patient Name: Mya Sarmiento  MRN: [id number]  Admission Date: 3/28/2023  Attending Physician: Vito Woodward MD  Primary Care Provider: Primary Doctor No  Principal Problem:Pulmonary hypertension    Subjective:     History of Present Illness:  33 y/o female with thyroid disease, obesity, ovarian cysts, and severe PAH by RHC performed on 12/1/2022. L/RHC that was consistent with severe PAH and no CAD noted. She is being directly admitted for initiation/uptitration of IV remodulin. She will be transitioned to SC prior to D/C. Denies CP, palpitations, syncope, presyncope, fevers, n/v/d, PND, orthopnea.       Past Medical History:   Diagnosis Date    Morbidly obese     Neuropathy     FEET    Ovarian cyst     BILATERALLY    Pulmonary hypertension     Severe tricuspid regurgitation     Thyroid disease     only has half a thyroid       Past Surgical History:   Procedure Laterality Date    LEFT HEART CATHETERIZATION Left 12/1/2022    Procedure: Left heart cath;  Surgeon: Kee Magana MD;  Location: [facility] CATH;  Service: Cardiovascular;  Laterality: Left;    RIGHT HEART CATHETERIZATION Right 12/1/2022    Procedure: INSERTION, CATHETER, RIGHT HEART;  Surgeon: Kee Magana MD;  Location: [facility] CATH;  Service: Cardiovascular;  Laterality: Right;  + [place]    RIGHT HEART CATHETERIZATION Right 2/1/2023    Procedure: INSERTION, CATHETER, RIGHT HEART;  Surgeon: Danny Clark MD;  Location: [facility] CATH LAB;  Service: Cardiology;  Laterality: Right;  with nitric oxide study    THYROIDECTOMY, PARTIAL       Review of patient's allergies indicates:   Allergen Reactions    Amoxicillin     Penicillins        Current Facility-Administered Medications   Medication    butalbital-acetaminophen-caffeine -40 mg per tablet 1 tablet    [START ON 3/29/2023] furosemide tablet 40 mg    HYDROcodone-acetaminophen 5-325 mg per tablet 1 tablet    [START ON  3/29/2023] levothyroxine tablet 175 mcg    loperamide capsule 2 mg    [START ON 3/29/2023] macitentan tablet 10 mg    melatonin tablet 9 mg    ondansetron disintegrating tablet 8 mg    ondansetron injection 4 mg    pregabalin capsule 50 mg    sodium chloride 0.9% flush 10 mL    [START ON 3/29/2023] spironolactone tablet 25 mg    traMADoL tablet 50 mg    treprostinil (REMODULIN) 3,000,000 ng in sodium chloride 0.9% 100 mL infusion    VELETRI/REMODULIN CASSETTE    VELETRI/REMODULIN TUBING     Family History       Problem Relation (Age of Onset)    Cancer Maternal Grandfather    No Known Problems Mother, Father    Ovarian cancer Maternal Grandmother          Tobacco Use    Smoking status: Former     Packs/day: 1.00     Years: 5.00     Pack years: 5.00     Types: Cigarettes     Quit date:      Years since quittin.2    Smokeless tobacco: Never   Substance and Sexual Activity    Alcohol use: Not Currently     Comment: seldom    Drug use: Never    Sexual activity: Yes     Partners: Male     Birth control/protection: OCP     Review of Systems   Constitutional: Negative.    HENT: Negative.     Eyes: Negative.    Respiratory: Negative.     Cardiovascular: Negative.    Gastrointestinal: Negative.    Endocrine: Negative.    Genitourinary: Negative.    Musculoskeletal: Negative.    Skin: Negative.    Allergic/Immunologic: Negative.    Neurological: Negative.    Hematological: Negative.    Psychiatric/Behavioral: Negative.     Objective:     Vital Signs (Most Recent):  Temp: 98.2 °F (36.8 °C) (23 1147)  Pulse: 82 (23 1147)  Resp: 18 (23 1147)  BP: 112/62 (23 1147)  SpO2: 95 % (23 1147) Vital Signs (24h Range):  Temp:  [98.2 °F (36.8 °C)] 98.2 °F (36.8 °C)  Pulse:  [82] 82  Resp:  [18] 18  SpO2:  [95 %] 95 %  BP: (112)/(62) 112/62     Patient Vitals for the past 72 hrs (Last 3 readings):   Weight   23 1148 79.9 kg (176 lb 2.4 oz)     Body mass index is 34.4 kg/m².    No  intake or output data in the 24 hours ending 03/28/23 1421    Physical Exam  Vitals and nursing note reviewed.   Constitutional:       Appearance: She is well-developed.   HENT:      Head: Normocephalic.   Eyes:      Pupils: Pupils are equal, round, and reactive to light.   Cardiovascular:      Rate and Rhythm: Normal rate and regular rhythm.   Pulmonary:      Effort: Pulmonary effort is normal.      Breath sounds: Normal breath sounds.   Abdominal:      General: Bowel sounds are normal.      Palpations: Abdomen is soft.   Musculoskeletal:         General: Normal range of motion.      Cervical back: Normal range of motion and neck supple.   Skin:     General: Skin is warm and dry.   Neurological:      Mental Status: She is alert and oriented to person, place, and time.   Psychiatric:         Behavior: Behavior normal.       Significant Labs:  CBC:  No results for input(s): WBC, RBC, HGB, HCT, PLT, MCV, MCH, MCHC in the last 168 hours.  BNP:  No results for input(s): BNP in the last 168 hours.    Invalid input(s): BNPTRIAGELBLO  CMP:  No results for input(s): GLU, CALCIUM, ALBUMIN, PROT, NA, K, CO2, CL, BUN, CREATININE, ALKPHOS, ALT, AST, BILITOT in the last 168 hours.   Coagulation:   No results for input(s): PT, INR, APTT in the last 168 hours.  LDH:  No results for input(s): LDH in the last 72 hours.  Microbiology:  Microbiology Results (last 7 days)       ** No results found for the last 168 hours. **            I have reviewed all pertinent labs within the past 24 hours.    Diagnostic Results:  I have reviewed all pertinent imaging results/findings within the past 24 hours.    Assessment/Plan:     * Pulmonary hypertension  -[facility] 1/23: RA 24 PA 94/40 (58),  PCWP 12, CO 4.8 l/min CI 2.l/min/m2  -Continue macitentan.   -Start IV remodulin per dosing sheet and up[titrate as tolerated. Will transition to SC @ D/C.       Baron Gregorio NP  Heart Transplant  [facility] - [facility]

## 2023-03-28 NOTE — TREATMENT PLAN
Patient admitting to start on IV Remodulin with a plan to switch to SQ (Quick 1:1 switch) on day of discharge    Plan is to place midline on admission and start   IV Remdoulin at 2ngs/kg/min   DW 82kgs    Start at 2ngs and titrate up by 1ng q6hrs as tolerated by the patient    2ngs = 8mls/24hrs             11ngs = 43mls/24hrs  3ngs = 12mls/24hrs           12ngs = 47mls/24hrs  4ngs = 16mls/24hrs           13ngs = 51mls/24hrs  5ngs = 20mls/24hrs           14ngs = 55mls/24hrs  6ngs = 24mls/24hrs           15ngs = 59mls/24hrs  7ngs = 28mls/24hrs           16ngs = 63mls/24hrs  8ngs = 31mls/24hrs           17ngs = 67mls/24hrs  9ngs =35mls/24hrs            18ngs = 71mls/24hr 10ngs = 39mls/24hrs         19ngs = 75mls/24hrs                                             20ngs =79mls/24hrs        SQ DOSING SHEET --HOME USE

## 2023-03-29 LAB
ALBUMIN SERPL BCP-MCNC: 3.5 G/DL (ref 3.5–5.2)
ALP SERPL-CCNC: 105 U/L (ref 55–135)
ALT SERPL W/O P-5'-P-CCNC: 53 U/L (ref 10–44)
ANION GAP SERPL CALC-SCNC: 10 MMOL/L (ref 8–16)
AST SERPL-CCNC: 35 U/L (ref 10–40)
BASOPHILS # BLD AUTO: 0.04 K/UL (ref 0–0.2)
BASOPHILS NFR BLD: 0.5 % (ref 0–1.9)
BILIRUB SERPL-MCNC: 1.1 MG/DL (ref 0.1–1)
BUN SERPL-MCNC: 16 MG/DL (ref 6–20)
CALCIUM SERPL-MCNC: 9.1 MG/DL (ref 8.7–10.5)
CHLORIDE SERPL-SCNC: 104 MMOL/L (ref 95–110)
CO2 SERPL-SCNC: 22 MMOL/L (ref 23–29)
CREAT SERPL-MCNC: 0.8 MG/DL (ref 0.5–1.4)
DIFFERENTIAL METHOD: ABNORMAL
EOSINOPHIL # BLD AUTO: 0.3 K/UL (ref 0–0.5)
EOSINOPHIL NFR BLD: 3.2 % (ref 0–8)
ERYTHROCYTE [DISTWIDTH] IN BLOOD BY AUTOMATED COUNT: 21.9 % (ref 11.5–14.5)
EST. GFR  (NO RACE VARIABLE): >60 ML/MIN/1.73 M^2
GLUCOSE SERPL-MCNC: 89 MG/DL (ref 70–110)
HCT VFR BLD AUTO: 35.8 % (ref 37–48.5)
HGB BLD-MCNC: 11 G/DL (ref 12–16)
IMM GRANULOCYTES # BLD AUTO: 0.02 K/UL (ref 0–0.04)
IMM GRANULOCYTES NFR BLD AUTO: 0.3 % (ref 0–0.5)
LYMPHOCYTES # BLD AUTO: 1 K/UL (ref 1–4.8)
LYMPHOCYTES NFR BLD: 12.3 % (ref 18–48)
MAGNESIUM SERPL-MCNC: 2 MG/DL (ref 1.6–2.6)
MCH RBC QN AUTO: 23.2 PG (ref 27–31)
MCHC RBC AUTO-ENTMCNC: 30.7 G/DL (ref 32–36)
MCV RBC AUTO: 75 FL (ref 82–98)
MONOCYTES # BLD AUTO: 0.5 K/UL (ref 0.3–1)
MONOCYTES NFR BLD: 6.8 % (ref 4–15)
NEUTROPHILS # BLD AUTO: 6 K/UL (ref 1.8–7.7)
NEUTROPHILS NFR BLD: 76.9 % (ref 38–73)
NRBC BLD-RTO: 0 /100 WBC
PLATELET # BLD AUTO: 261 K/UL (ref 150–450)
PMV BLD AUTO: 12.7 FL (ref 9.2–12.9)
POTASSIUM SERPL-SCNC: 4 MMOL/L (ref 3.5–5.1)
PROT SERPL-MCNC: 7.6 G/DL (ref 6–8.4)
RBC # BLD AUTO: 4.75 M/UL (ref 4–5.4)
SODIUM SERPL-SCNC: 136 MMOL/L (ref 136–145)
WBC # BLD AUTO: 7.75 K/UL (ref 3.9–12.7)

## 2023-03-29 PROCEDURE — 63600175 PHARM REV CODE 636 W HCPCS: Mod: JG | Performed by: INTERNAL MEDICINE

## 2023-03-29 PROCEDURE — 25000003 PHARM REV CODE 250: Performed by: INTERNAL MEDICINE

## 2023-03-29 PROCEDURE — 99233 SBSQ HOSP IP/OBS HIGH 50: CPT | Mod: ,,, | Performed by: NURSE PRACTITIONER

## 2023-03-29 PROCEDURE — 63700000 PHARM REV CODE 250 ALT 637 W/O HCPCS: Performed by: INTERNAL MEDICINE

## 2023-03-29 PROCEDURE — 83735 ASSAY OF MAGNESIUM: CPT | Performed by: NURSE PRACTITIONER

## 2023-03-29 PROCEDURE — 80053 COMPREHEN METABOLIC PANEL: CPT | Performed by: NURSE PRACTITIONER

## 2023-03-29 PROCEDURE — 85025 COMPLETE CBC W/AUTO DIFF WBC: CPT | Performed by: NURSE PRACTITIONER

## 2023-03-29 PROCEDURE — 94761 N-INVAS EAR/PLS OXIMETRY MLT: CPT

## 2023-03-29 PROCEDURE — 25000003 PHARM REV CODE 250: Performed by: NURSE PRACTITIONER

## 2023-03-29 PROCEDURE — 36415 COLL VENOUS BLD VENIPUNCTURE: CPT | Performed by: NURSE PRACTITIONER

## 2023-03-29 PROCEDURE — 99233 PR SUBSEQUENT HOSPITAL CARE,LEVL III: ICD-10-PCS | Mod: ,,, | Performed by: NURSE PRACTITIONER

## 2023-03-29 PROCEDURE — 20600001 HC STEP DOWN PRIVATE ROOM

## 2023-03-29 RX ADMIN — NORGESTIMATE AND ETHINYL ESTRADIOL 1 TABLET: KIT at 08:03

## 2023-03-29 RX ADMIN — TREPROSTINIL 6 NG/KG/MIN: 200 INJECTION, SOLUTION INTRAVENOUS; SUBCUTANEOUS at 03:03

## 2023-03-29 RX ADMIN — BUTALBITAL, ACETAMINOPHEN, AND CAFFEINE 1 TABLET: 325; 50; 40 TABLET ORAL at 05:03

## 2023-03-29 RX ADMIN — PREGABALIN 50 MG: 50 CAPSULE ORAL at 08:03

## 2023-03-29 RX ADMIN — BUTALBITAL, ACETAMINOPHEN, AND CAFFEINE 1 TABLET: 325; 50; 40 TABLET ORAL at 09:03

## 2023-03-29 RX ADMIN — SPIRONOLACTONE 25 MG: 25 TABLET, FILM COATED ORAL at 09:03

## 2023-03-29 RX ADMIN — FUROSEMIDE 40 MG: 40 TABLET ORAL at 09:03

## 2023-03-29 RX ADMIN — MACITENTAN 10 MG: 10 TABLET, FILM COATED ORAL at 08:03

## 2023-03-29 RX ADMIN — LEVOTHYROXINE SODIUM 175 MCG: 125 TABLET ORAL at 05:03

## 2023-03-29 RX ADMIN — ONDANSETRON 8 MG: 8 TABLET, ORALLY DISINTEGRATING ORAL at 01:03

## 2023-03-29 RX ADMIN — Medication 9 MG: at 08:03

## 2023-03-29 NOTE — SUBJECTIVE & OBJECTIVE
Interval History: NAOEN. Tolerating IV Remodulin.     Continuous Infusions:   treprostinil (REMODULIN) infusion 5 ng/kg/min (03/29/23 0937)    veletri/remodulin cassette      veletri/remodulin tubing       Scheduled Meds:   furosemide  40 mg Oral Daily    levothyroxine  175 mcg Oral Before breakfast    macitentan  10 mg Oral QHS    melatonin  9 mg Oral Nightly    norgestimate-ethinyl estradioL  1 tablet Oral QHS    pregabalin  50 mg Oral QHS    spironolactone  25 mg Oral Daily     PRN Meds:butalbital-acetaminophen-caffeine -40 mg, HYDROcodone-acetaminophen, loperamide, ondansetron, ondansetron, sodium chloride 0.9%, traMADoL    Review of patient's allergies indicates:   Allergen Reactions    Amoxicillin     Penicillins      Objective:     Vital Signs (Most Recent):  Temp: 97.6 °F (36.4 °C) (03/29/23 1123)  Pulse: 74 (03/29/23 1123)  Resp: 20 (03/29/23 1123)  BP: (!) 115/59 (03/29/23 1123)  SpO2: 99 % (03/29/23 1123)   Vital Signs (24h Range):  Temp:  [97.6 °F (36.4 °C)-98.5 °F (36.9 °C)] 97.6 °F (36.4 °C)  Pulse:  [69-86] 74  Resp:  [15-20] 20  SpO2:  [95 %-100 %] 99 %  BP: (104-118)/(59-73) 115/59     Patient Vitals for the past 72 hrs (Last 3 readings):   Weight   03/29/23 0327 80.9 kg (178 lb 5.6 oz)   03/28/23 1148 79.9 kg (176 lb 2.4 oz)     Body mass index is 34.83 kg/m².      Intake/Output Summary (Last 24 hours) at 3/29/2023 1151  Last data filed at 3/29/2023 0327  Gross per 24 hour   Intake 810 ml   Output 600 ml   Net 210 ml        Telemetry: nsr    Physical Exam  Vitals and nursing note reviewed.   Constitutional:       Appearance: She is well-developed.   HENT:      Head: Normocephalic.   Eyes:      Pupils: Pupils are equal, round, and reactive to light.   Cardiovascular:      Rate and Rhythm: Normal rate and regular rhythm.   Pulmonary:      Effort: Pulmonary effort is normal.      Breath sounds: Normal breath sounds.   Abdominal:      General: Bowel sounds are normal.      Palpations: Abdomen is  soft.   Musculoskeletal:         General: Normal range of motion.      Cervical back: Normal range of motion and neck supple.   Skin:     General: Skin is warm and dry.   Neurological:      Mental Status: She is alert and oriented to person, place, and time.   Psychiatric:         Behavior: Behavior normal.     Significant Labs:  CBC:  Recent Labs   Lab 03/29/23  0656   WBC 7.75   RBC 4.75   HGB 11.0*   HCT 35.8*      MCV 75*   MCH 23.2*   MCHC 30.7*     BNP:  No results for input(s): BNP in the last 168 hours.    Invalid input(s): BNPTRIAGELBLO  CMP:  Recent Labs   Lab 03/29/23  0656   GLU 89   CALCIUM 9.1   ALBUMIN 3.5   PROT 7.6      K 4.0   CO2 22*      BUN 16   CREATININE 0.8   ALKPHOS 105   ALT 53*   AST 35   BILITOT 1.1*      Coagulation:   No results for input(s): PT, INR, APTT in the last 168 hours.  LDH:  No results for input(s): LDH in the last 72 hours.  Microbiology:  Microbiology Results (last 7 days)       ** No results found for the last 168 hours. **          I have reviewed all pertinent labs within the past 24 hours.    Estimated Creatinine Clearance: 95.1 mL/min (based on SCr of 0.8 mg/dL).    Diagnostic Results:  I have reviewed all pertinent imaging results/findings within the past 24 hours.

## 2023-03-29 NOTE — PLAN OF CARE
Pt admitted 3/28 for IV remodulin initiation. Pt is AAOx4, VSS on bedside monitor, afebrile, on RA, NSR on tele. Remodulin being titrated up by 1ng/kg/min q6hrs. Infusing via RUE midline. See MAR for details. Pt tolerating infusion thus far. Minimal side effects (facial flushing and mild nausea), PRNs available. Plan for pt to switch to subQ remodulin on discharge day. Planning for discharge on Friday (3/31). Pt is up independently to toilet, remains free from falls/injuries so far this shift. Nonskid socks on, call bell within reach, bed locked/lowest position. Pt verbalized understanding to call for needs/assistance. Spouse at bedside.

## 2023-03-29 NOTE — NURSING
Remodulin titrated up to 5ng/kg/min (20cc/24hrs). Monitoring vitals [id number] for first hour. Pt educated on s/s and PRNs available.

## 2023-03-29 NOTE — PROGRESS NOTES
[facility]  Heart Transplant  Progress Note    Patient Name: Mya Sarmiento  MRN: [id number]  Admission Date: 3/28/2023  Hospital Length of Stay: 1 days  Attending Physician: Vito Woodward MD  Primary Care Provider: Primary Doctor No  Principal Problem:Pulmonary hypertension    Subjective:     Interval History: NAOEN. Tolerating IV Remodulin.     Continuous Infusions:   treprostinil (REMODULIN) infusion 5 ng/kg/min (03/29/23 0937)    veletri/remodulin cassette      veletri/remodulin tubing       Scheduled Meds:   furosemide  40 mg Oral Daily    levothyroxine  175 mcg Oral Before breakfast    macitentan  10 mg Oral QHS    melatonin  9 mg Oral Nightly    norgestimate-ethinyl estradioL  1 tablet Oral QHS    pregabalin  50 mg Oral QHS    spironolactone  25 mg Oral Daily     PRN Meds:butalbital-acetaminophen-caffeine -40 mg, HYDROcodone-acetaminophen, loperamide, ondansetron, ondansetron, sodium chloride 0.9%, traMADoL    Review of patient's allergies indicates:   Allergen Reactions    Amoxicillin     Penicillins      Objective:     Vital Signs (Most Recent):  Temp: 97.6 °F (36.4 °C) (03/29/23 1123)  Pulse: 74 (03/29/23 1123)  Resp: 20 (03/29/23 1123)  BP: (!) 115/59 (03/29/23 1123)  SpO2: 99 % (03/29/23 1123)   Vital Signs (24h Range):  Temp:  [97.6 °F (36.4 °C)-98.5 °F (36.9 °C)] 97.6 °F (36.4 °C)  Pulse:  [69-86] 74  Resp:  [15-20] 20  SpO2:  [95 %-100 %] 99 %  BP: (104-118)/(59-73) 115/59     Patient Vitals for the past 72 hrs (Last 3 readings):   Weight   03/29/23 0327 80.9 kg (178 lb 5.6 oz)   03/28/23 1148 79.9 kg (176 lb 2.4 oz)     Body mass index is 34.83 kg/m².      Intake/Output Summary (Last 24 hours) at 3/29/2023 1151  Last data filed at 3/29/2023 0327  Gross per 24 hour   Intake 810 ml   Output 600 ml   Net 210 ml        Telemetry: nsr    Physical Exam  Vitals and nursing note reviewed.   Constitutional:       Appearance: She is well-developed.   HENT:      Head:  Normocephalic.   Eyes:      Pupils: Pupils are equal, round, and reactive to light.   Cardiovascular:      Rate and Rhythm: Normal rate and regular rhythm.   Pulmonary:      Effort: Pulmonary effort is normal.      Breath sounds: Normal breath sounds.   Abdominal:      General: Bowel sounds are normal.      Palpations: Abdomen is soft.   Musculoskeletal:         General: Normal range of motion.      Cervical back: Normal range of motion and neck supple.   Skin:     General: Skin is warm and dry.   Neurological:      Mental Status: She is alert and oriented to person, place, and time.   Psychiatric:         Behavior: Behavior normal.     Significant Labs:  CBC:  Recent Labs   Lab 03/29/23  0656   WBC 7.75   RBC 4.75   HGB 11.0*   HCT 35.8*      MCV 75*   MCH 23.2*   MCHC 30.7*     BNP:  No results for input(s): BNP in the last 168 hours.    Invalid input(s): BNPTRIAGELBLO  CMP:  Recent Labs   Lab 03/29/23  0656   GLU 89   CALCIUM 9.1   ALBUMIN 3.5   PROT 7.6      K 4.0   CO2 22*      BUN 16   CREATININE 0.8   ALKPHOS 105   ALT 53*   AST 35   BILITOT 1.1*      Coagulation:   No results for input(s): PT, INR, APTT in the last 168 hours.  LDH:  No results for input(s): LDH in the last 72 hours.  Microbiology:  Microbiology Results (last 7 days)       ** No results found for the last 168 hours. **          I have reviewed all pertinent labs within the past 24 hours.    Estimated Creatinine Clearance: 95.1 mL/min (based on SCr of 0.8 mg/dL).    Diagnostic Results:  I have reviewed all pertinent imaging results/findings within the past 24 hours.    Assessment and Plan:     33 y/o female with thyroid disease, obesity, ovarian cysts, and severe PAH by RHC performed on 12/1/2022. L/RHC that was consistent with severe PAH and no CAD noted. She is being directly admitted for initiation/uptitration of IV remodulin. She will be transitioned to SC prior to D/C. Denies CP, palpitations, syncope, presyncope,  fevers, n/v/d, PND, orthopnea.         * Pulmonary hypertension  -RHC 1/23: RA 24 PA 94/40 (58),  PCWP 12, CO 4.8 l/min CI 2.l/min/m2  -Continue macitentan.   -Cont IV remodulin per dosing sheet and up-titrate as tolerated. Will transition to SC @ D/C.        Baron Gregorio NP  Heart Transplant  [facility]

## 2023-03-29 NOTE — PROGRESS NOTES
Admit     Met with patient and  to assess needs. Patient is a 32 y.o.  (12 years) female, admitted for  PH .      Patient admitted on 3/28/2023 .  At this time, patient presents as alert and oriented x 4, pleasant, good eye contact, recall good, concentration/judgement good, calm, communicative, cooperative, and asking and answering questions appropriately.  At this time, patients caregiver presents as alert and oriented x 4, pleasant, good eye contact, recall good, concentration/judgement good, calm, communicative, cooperative, and asking and answering questions appropriately.    Household/Family Systems     Patient resides with patient's  and child(patricio), age(s) 10 and 19 , at       [address]  [place] [address].        Support system includes:    Jeremi Roy () [phone]        Patient does have dependents that are in need of being cared for. Patient's 10 year old son are being cared for by step son who is 19 years old at home.     Patients primary caregiver is herself.  Confirmed patients contact information is [phone] (home);   Telephone Information:   Mobile [phone]   Mobile Not on file.   .    During admission, patient's caregiver plans to stay in patient's room.  Confirmed patient and patients caregivers do have access to reliable transportation.    Cognitive Status/Learning     Patient reports reading ability as 12th grade and states patient does not have difficulty with reading, writing, seeing, hearing, comprehension, learning, and memory.  Patient reports patient learns best by watching.   Needed: N/A.   Highest education level: High School (9-12) or GED    Vocation/Disability   .  Working for Income: No  If no, reason not working: Patient Choice - Homemaker  Patient's  works full time as a      Adherence     Patient reports a high level of adherence to patients health care regimen.  Adherence counseling and education  provided. Patient verbalizes understanding.    Substance Use    Patient reports the following substance usage.    Tobacco: none, patient denies any use. Quit 7 years ago.   Alcohol: none, patient denies any use.  Illicit Drugs/Non-prescribed Medications: none, patient denies any use.   Patient states clear understanding of the potential impact of substance use.  Substance abstinence/cessation counseling, education and resources provided and reviewed.     Services Utilizing/ADLS    Infusion Service: Prior to admission, patient utilizing? no  Home Health: Prior to admission, patient utilizing? no  DME: Prior to admission, no  Pulmonary/Cardiac Rehab: Prior to admission, no  Dialysis:  Prior to admission, no  Transplant Specialty Pharmacy:  Prior to admission, no.    Prior to admission, patient reports patient was independent with ADLS and was not driving.  Patient reports patient is able to care for self at this time..  Patient indicates a willingness to care for self once medically cleared to do so.    Insurance/Medications    Insured by   Payer/Plan Subscr  Sex Relation Sub. Ins. ID Effective Group Num   1. [facility]* MANOHAR BUTLER 1981 Male Spouse [id number]                                   [address] [address]      Primary Insurance (for UNOS reporting): Private Insurance  Secondary Insurance (for UNOS reporting): None    Patient reports patient is able to obtain and afford medications at this time and at time of discharge.    Living Will/Healthcare Power of     Patient states patient does not have a LW and/or HCPA. Pt indicates she would like her  to make her decisions if she cannot.   provided education regarding LW and HCPA and the completion of forms.     Coping/Mental Health    Patient is coping adequately with the aid of  family members and friends.  Patient denies mental health difficulties.     Discharge Planning    At time of discharge, patient plans to return  to patient's home under the care of herself.  Patients  will transport patient.  Per rounds today, expected discharge date is Friday . Patient and patients caregiver  verbalize understanding and are involved in treatment planning and discharge process.    Additional Concerns    Patient's caretaker denies additional needs and/or concerns at this time. Patient is being followed for needs, education, resources, information, emotional support, supportive counseling, and for supportive and skilled discharge plan of care.  providing ongoing psychosocial support, education, resources and d/c planning as needed.  SW remains available.  provided resource list, patient choice, psychosocial and supportive counseling, resources, education, assistance and discharge planning with patient and caregiver involvement, ongoing SW availability and services as appropriate.  remains available. Patient's caregiver verbalizes understanding and agreement with information reviewed,  availability and how to access available resources as needed. Patient denies additional needs and/or concerns at this time. Patient verbalizes understanding and agreement with information reviewed, social work availability, and how to access available resources as needed.

## 2023-03-29 NOTE — PLAN OF CARE
AAOx3, afebrile,w/o c/o pain. Telemetry monitor in place (NSR). 22g IV saline locked. Left midline with remodulin infusing. Remodulin increased to 3ng/kg/min, DW 82kg, 12cc/24hr @2140. Pt tolerating titration. Next titration @0340. Increasing remodulin by 1ng/kg/min q6h. Pt's stated she takes her Opsumit in the pm rather than the am. Medication changed by Dr. Spicer. Pt's birth control added to her MAR by Dr. Spicer. Pt has light pink strands in urine- stated she about to start her period. Pt able to position self independently.  Pt in lowest position, side rails up x2, non-skid foot wear in place, call light within reach, pt verbalized understanding to call RN when needed.  Hand hygiene practiced per protocol.  Will continue to monitor.

## 2023-03-29 NOTE — ASSESSMENT & PLAN NOTE
-RHC 1/23: RA 24 PA 94/40 (58),  PCWP 12, CO 4.8 l/min CI 2.l/min/m2  -Continue macitentan.   -Cont IV remodulin per dosing sheet and up-titrate as tolerated. Will transition to SC @ D/C.

## 2023-03-29 NOTE — NURSING
Remodulin cassette changed with second RN witness. Rate increased to 6units/kg/min (24cc/24hrs). See MAR.

## 2023-03-30 ENCOUNTER — TELEPHONE (OUTPATIENT)
Dept: TRANSPLANT | Facility: CLINIC | Age: 33
End: 2023-03-30
Payer: COMMERCIAL

## 2023-03-30 LAB
ALBUMIN SERPL BCP-MCNC: 3.3 G/DL (ref 3.5–5.2)
ALP SERPL-CCNC: 98 U/L (ref 55–135)
ALT SERPL W/O P-5'-P-CCNC: 65 U/L (ref 10–44)
ANION GAP SERPL CALC-SCNC: 11 MMOL/L (ref 8–16)
AST SERPL-CCNC: 49 U/L (ref 10–40)
BILIRUB SERPL-MCNC: 1.5 MG/DL (ref 0.1–1)
BUN SERPL-MCNC: 15 MG/DL (ref 6–20)
CALCIUM SERPL-MCNC: 9.3 MG/DL (ref 8.7–10.5)
CHLORIDE SERPL-SCNC: 104 MMOL/L (ref 95–110)
CO2 SERPL-SCNC: 21 MMOL/L (ref 23–29)
CREAT SERPL-MCNC: 0.8 MG/DL (ref 0.5–1.4)
EST. GFR  (NO RACE VARIABLE): >60 ML/MIN/1.73 M^2
GLUCOSE SERPL-MCNC: 83 MG/DL (ref 70–110)
HAV IGM SERPL QL IA: NORMAL
HBV CORE IGM SERPL QL IA: NORMAL
HBV SURFACE AG SERPL QL IA: NORMAL
HCV AB SERPL QL IA: NORMAL
MAGNESIUM SERPL-MCNC: 2.1 MG/DL (ref 1.6–2.6)
POTASSIUM SERPL-SCNC: 4.4 MMOL/L (ref 3.5–5.1)
PROT SERPL-MCNC: 7.3 G/DL (ref 6–8.4)
SODIUM SERPL-SCNC: 136 MMOL/L (ref 136–145)

## 2023-03-30 PROCEDURE — 83735 ASSAY OF MAGNESIUM: CPT | Performed by: NURSE PRACTITIONER

## 2023-03-30 PROCEDURE — 80074 ACUTE HEPATITIS PANEL: CPT | Performed by: INTERNAL MEDICINE

## 2023-03-30 PROCEDURE — 99233 SBSQ HOSP IP/OBS HIGH 50: CPT | Mod: ,,, | Performed by: INTERNAL MEDICINE

## 2023-03-30 PROCEDURE — 36415 COLL VENOUS BLD VENIPUNCTURE: CPT | Performed by: INTERNAL MEDICINE

## 2023-03-30 PROCEDURE — 99233 PR SUBSEQUENT HOSPITAL CARE,LEVL III: ICD-10-PCS | Mod: ,,, | Performed by: INTERNAL MEDICINE

## 2023-03-30 PROCEDURE — 63600175 PHARM REV CODE 636 W HCPCS: Mod: JG | Performed by: INTERNAL MEDICINE

## 2023-03-30 PROCEDURE — 80053 COMPREHEN METABOLIC PANEL: CPT | Performed by: NURSE PRACTITIONER

## 2023-03-30 PROCEDURE — 25000003 PHARM REV CODE 250: Performed by: INTERNAL MEDICINE

## 2023-03-30 PROCEDURE — 86038 ANTINUCLEAR ANTIBODIES: CPT | Performed by: INTERNAL MEDICINE

## 2023-03-30 PROCEDURE — 36415 COLL VENOUS BLD VENIPUNCTURE: CPT | Performed by: NURSE PRACTITIONER

## 2023-03-30 PROCEDURE — 20600001 HC STEP DOWN PRIVATE ROOM

## 2023-03-30 PROCEDURE — 86039 ANTINUCLEAR ANTIBODIES (ANA): CPT | Performed by: INTERNAL MEDICINE

## 2023-03-30 PROCEDURE — 86235 NUCLEAR ANTIGEN ANTIBODY: CPT | Mod: 59 | Performed by: INTERNAL MEDICINE

## 2023-03-30 PROCEDURE — 63700000 PHARM REV CODE 250 ALT 637 W/O HCPCS: Performed by: INTERNAL MEDICINE

## 2023-03-30 PROCEDURE — 86015 ACTIN ANTIBODY EACH: CPT | Performed by: INTERNAL MEDICINE

## 2023-03-30 PROCEDURE — 25000003 PHARM REV CODE 250: Performed by: NURSE PRACTITIONER

## 2023-03-30 PROCEDURE — 86381 MITOCHONDRIAL ANTIBODY EACH: CPT | Performed by: INTERNAL MEDICINE

## 2023-03-30 RX ADMIN — BUTALBITAL, ACETAMINOPHEN, AND CAFFEINE 1 TABLET: 325; 50; 40 TABLET ORAL at 08:03

## 2023-03-30 RX ADMIN — MACITENTAN 10 MG: 10 TABLET, FILM COATED ORAL at 08:03

## 2023-03-30 RX ADMIN — ONDANSETRON 8 MG: 8 TABLET, ORALLY DISINTEGRATING ORAL at 04:03

## 2023-03-30 RX ADMIN — Medication 9 MG: at 08:03

## 2023-03-30 RX ADMIN — LEVOTHYROXINE SODIUM 175 MCG: 125 TABLET ORAL at 05:03

## 2023-03-30 RX ADMIN — NORGESTIMATE AND ETHINYL ESTRADIOL 1 TABLET: KIT at 08:03

## 2023-03-30 RX ADMIN — TREPROSTINIL 10 NG/KG/MIN: 200 INJECTION, SOLUTION INTRAVENOUS; SUBCUTANEOUS at 03:03

## 2023-03-30 RX ADMIN — FUROSEMIDE 40 MG: 40 TABLET ORAL at 08:03

## 2023-03-30 RX ADMIN — PREGABALIN 50 MG: 50 CAPSULE ORAL at 08:03

## 2023-03-30 RX ADMIN — BUTALBITAL, ACETAMINOPHEN, AND CAFFEINE 1 TABLET: 325; 50; 40 TABLET ORAL at 03:03

## 2023-03-30 RX ADMIN — SPIRONOLACTONE 25 MG: 25 TABLET, FILM COATED ORAL at 08:03

## 2023-03-30 NOTE — PROGRESS NOTES
[facility]  Heart Transplant  Progress Note    Patient Name: Mya Sarmiento  MRN: [id number]  Admission Date: 3/28/2023  Hospital Length of Stay: 2 days  Attending Physician: Vito Woodward MD  Primary Care Provider: Primary Doctor No  Principal Problem:Pulmonary hypertension    Subjective:     Interval History:   No acute overnight events  Reports mild headache yesterday, resolved now   One episode of     Continuous Infusions:   treprostinil (REMODULIN) infusion 9 ng/kg/min (03/30/23 0926)    veletri/remodulin cassette      veletri/remodulin tubing       Scheduled Meds:   furosemide  40 mg Oral Daily    levothyroxine  175 mcg Oral Before breakfast    macitentan  10 mg Oral QHS    melatonin  9 mg Oral Nightly    norgestimate-ethinyl estradioL  1 tablet Oral QHS    pregabalin  50 mg Oral QHS    spironolactone  25 mg Oral Daily     PRN Meds:butalbital-acetaminophen-caffeine -40 mg, HYDROcodone-acetaminophen, loperamide, ondansetron, ondansetron, sodium chloride 0.9%, traMADoL    Review of patient's allergies indicates:   Allergen Reactions    Amoxicillin     Penicillins      Objective:     Vital Signs (Most Recent):  Temp: 98.5 °F (36.9 °C) (03/30/23 0819)  Pulse: 72 (03/30/23 1113)  Resp: 14 (03/30/23 1030)  BP: (!) 108/58 (03/30/23 1030)  SpO2: 100 % (03/30/23 1030)   Vital Signs (24h Range):  Temp:  [97.7 °F (36.5 °C)-98.5 °F (36.9 °C)] 98.5 °F (36.9 °C)  Pulse:  [69-84] 72  Resp:  [14-20] 14  SpO2:  [95 %-100 %] 100 %  BP: (103-115)/(57-71) 108/58     Patient Vitals for the past 72 hrs (Last 3 readings):   Weight   03/30/23 0304 79.9 kg (176 lb 0.6 oz)   03/29/23 0327 80.9 kg (178 lb 5.6 oz)   03/28/23 1148 79.9 kg (176 lb 2.4 oz)     Body mass index is 34.38 kg/m².      Intake/Output Summary (Last 24 hours) at 3/30/2023 1126  Last data filed at 3/30/2023 1009  Gross per 24 hour   Intake 1080 ml   Output 1975 ml   Net -895 ml       Hemodynamic Parameters:           Physical  Exam  Vitals and nursing note reviewed.   Constitutional:       Appearance: She is well-developed.   HENT:      Head: Normocephalic.   Eyes:      Pupils: Pupils are equal, round, and reactive to light.   Cardiovascular:      Rate and Rhythm: Normal rate and regular rhythm.   Pulmonary:      Effort: Pulmonary effort is normal.      Breath sounds: Normal breath sounds.   Abdominal:      General: Bowel sounds are normal.      Palpations: Abdomen is soft.   Musculoskeletal:         General: Normal range of motion.      Cervical back: Normal range of motion and neck supple.   Skin:     General: Skin is warm and dry.   Neurological:      Mental Status: She is alert and oriented to person, place, and time.   Psychiatric:         Behavior: Behavior normal.         Significant Labs:  CBC:  Recent Labs   Lab 03/29/23  0656   WBC 7.75   RBC 4.75   HGB 11.0*   HCT 35.8*      MCV 75*   MCH 23.2*   MCHC 30.7*     BNP:  No results for input(s): BNP in the last 168 hours.    Invalid input(s): BNPTRIAGELBLO  CMP:  Recent Labs   Lab 03/29/23  0656 03/30/23  0640   GLU 89 83   CALCIUM 9.1 9.3   ALBUMIN 3.5 3.3*   PROT 7.6 7.3    136   K 4.0 4.4   CO2 22* 21*    104   BUN 16 15   CREATININE 0.8 0.8   ALKPHOS 105 98   ALT 53* 65*   AST 35 49*   BILITOT 1.1* 1.5*      Coagulation:   No results for input(s): PT, INR, APTT in the last 168 hours.  LDH:  No results for input(s): LDH in the last 72 hours.  Microbiology:  Microbiology Results (last 7 days)       ** No results found for the last 168 hours. **            I have reviewed all pertinent labs within the past 24 hours.    Estimated Creatinine Clearance: 94.5 mL/min (based on SCr of 0.8 mg/dL).    Diagnostic Results:  I have reviewed and interpreted all pertinent imaging results/findings within the past 24 hours.    Assessment and Plan:     31 y/o female with thyroid disease, obesity, ovarian cysts, and severe PAH by RHC performed on 12/1/2022. L/RHC that was  consistent with severe PAH and no CAD noted. She is being directly admitted for initiation/uptitration of IV remodulin. She will be transitioned to SC prior to D/C. Denies CP, palpitations, syncope, presyncope, fevers, n/v/d, PND, orthopnea.         * Pulmonary hypertension  -[facility] 1/23: RA 24 PA 94/40 (58),  PCWP 12, CO 4.8 l/min CI 2.l/min/m2  -Continue macitentan.   -Cont IV remodulin per dosing sheet and up-titrate as tolerated. Will transition to SC @ D/C.          Acacia Ugalde MD  Heart Transplant  [facility]

## 2023-03-30 NOTE — PLAN OF CARE
Pt aao x4. Call bell within reach. She is up independent. She is tolerating remodulin titration well. Currently on 9ng/kg/min. Plan to continue titration q1ljsoy. Next due around 1530. LFTs elevated. Plan for abd ultrasound this evening. Plan for d/c tomorrow after switching from IV remodulin to sub-q. She verbalized understanding of plan of care.

## 2023-03-30 NOTE — PLAN OF CARE
Patient is currently at 9ngs/kg/min of IV Remodulin    Spoke with [facility] nurse (Aubrey) yesterday and this am    Plan to to switch to SQ tomorrow morning and discharge a couple hours after the switch     Awaiting SQ HOME Dosing sheet to note in the chart    Continue to up-titrate by 1ng/kg/min as tolerated by the patient

## 2023-03-30 NOTE — TELEPHONE ENCOUNTER
"NN presented to patient's room to see if she had any questions.  Patient was sitting in the chair.  Spouse had gone home.  Patient states that everything is going well.  NN asked if the specialty pharmacy nurse, Aubrey, had been to see her yet, and the patient stated yes and that the batteries were charging for her Remunity pump.  NN could see two batteries charging in the wall behind the patient's reclining chair.  Patient  motioned to her right middle abdomen - where she says her "dry" site is.  NN asked if the patient had any questions.  Patient states that no, she believed that all her questions were answered.  NN asked if she had any problems with symptoms, and patient states that she had a headache but was given medication a little while ago.  Advised patient to make sure she will ask what medication(s) she has been given and make sure that she gets a prescription or medication before she leaves the hospital tomorrow.   Patient did mention her weight loss. NN advised patient to weigh everyday and make sure she reports weight gain, especially gradual weight gain with worsening symptoms.  Patient states that she has been trying to keep her sodium down.  NN advised patient to read labels and avoid salt substitutes that have a lot of potassium as well.  Gave patient the direct number to NN office phone, so that she or her spouse can call with questions if they have them.  Patient asked where to get an oxygen meter. NN advised patient to get one on [employer] that the batteries can be replaced.  NN also advised for patient to get a blood pressure cuff as well.    "

## 2023-03-30 NOTE — NURSING
Remodulin titrated up to 7ng/kg/min (28cc/24hr). Monitoring vitals q15 for one hour. Educated pt to call if any changes or symptoms. Will continue to monitor.

## 2023-03-30 NOTE — NURSING
Remodulin titrated up to 8ng/kg/min (31cc/24hr). Monitoring vitals q15 min for 1 hour. Educated pt to call if any changes or symptoms occur. Will continue to monitor.

## 2023-03-30 NOTE — PLAN OF CARE
Pt remains AAO x 4 with VSS throughout shift  Chief complaint of headache - relief with PRN fioricet    Pt remodulin pump on, running, no leaks or kinks in the tubing. Dressing CDI.  Remodulin running through left midline. Pt titrated to 7ng/kg/min at 2130, tolerated well. Increasing remodulin 1ng q6h. Next titration @0330.  Planning to switch pt to subq remodulin upon discharge.  Tele remains NSR  Pt has 22g LA, saline locked.   Pt up independent and ambulatory, voids in toilet, LBM 3/29  Regular diet   Pt remains free from falls and injuries, call light in reach, bed in lowest position, nonskid socks on when OOB, side rails up x2  Will continue to monitor

## 2023-03-30 NOTE — SUBJECTIVE & OBJECTIVE
Interval History:   No acute overnight events  Reports mild headache yesterday, resolved now   One episode of     Continuous Infusions:   treprostinil (REMODULIN) infusion 9 ng/kg/min (03/30/23 0926)    veletri/remodulin cassette      veletri/remodulin tubing       Scheduled Meds:   furosemide  40 mg Oral Daily    levothyroxine  175 mcg Oral Before breakfast    macitentan  10 mg Oral QHS    melatonin  9 mg Oral Nightly    norgestimate-ethinyl estradioL  1 tablet Oral QHS    pregabalin  50 mg Oral QHS    spironolactone  25 mg Oral Daily     PRN Meds:butalbital-acetaminophen-caffeine -40 mg, HYDROcodone-acetaminophen, loperamide, ondansetron, ondansetron, sodium chloride 0.9%, traMADoL    Review of patient's allergies indicates:   Allergen Reactions    Amoxicillin     Penicillins      Objective:     Vital Signs (Most Recent):  Temp: 98.5 °F (36.9 °C) (03/30/23 0819)  Pulse: 72 (03/30/23 1113)  Resp: 14 (03/30/23 1030)  BP: (!) 108/58 (03/30/23 1030)  SpO2: 100 % (03/30/23 1030)   Vital Signs (24h Range):  Temp:  [97.7 °F (36.5 °C)-98.5 °F (36.9 °C)] 98.5 °F (36.9 °C)  Pulse:  [69-84] 72  Resp:  [14-20] 14  SpO2:  [95 %-100 %] 100 %  BP: (103-115)/(57-71) 108/58     Patient Vitals for the past 72 hrs (Last 3 readings):   Weight   03/30/23 0304 79.9 kg (176 lb 0.6 oz)   03/29/23 0327 80.9 kg (178 lb 5.6 oz)   03/28/23 1148 79.9 kg (176 lb 2.4 oz)     Body mass index is 34.38 kg/m².      Intake/Output Summary (Last 24 hours) at 3/30/2023 1126  Last data filed at 3/30/2023 1009  Gross per 24 hour   Intake 1080 ml   Output 1975 ml   Net -895 ml       Hemodynamic Parameters:           Physical Exam  Vitals and nursing note reviewed.   Constitutional:       Appearance: She is well-developed.   HENT:      Head: Normocephalic.   Eyes:      Pupils: Pupils are equal, round, and reactive to light.   Cardiovascular:      Rate and Rhythm: Normal rate and regular rhythm.   Pulmonary:      Effort: Pulmonary effort is normal.       Breath sounds: Normal breath sounds.   Abdominal:      General: Bowel sounds are normal.      Palpations: Abdomen is soft.   Musculoskeletal:         General: Normal range of motion.      Cervical back: Normal range of motion and neck supple.   Skin:     General: Skin is warm and dry.   Neurological:      Mental Status: She is alert and oriented to person, place, and time.   Psychiatric:         Behavior: Behavior normal.         Significant Labs:  CBC:  Recent Labs   Lab 03/29/23  0656   WBC 7.75   RBC 4.75   HGB 11.0*   HCT 35.8*      MCV 75*   MCH 23.2*   MCHC 30.7*     BNP:  No results for input(s): BNP in the last 168 hours.    Invalid input(s): BNPTRIAGELBLO  CMP:  Recent Labs   Lab 03/29/23  0656 03/30/23  0640   GLU 89 83   CALCIUM 9.1 9.3   ALBUMIN 3.5 3.3*   PROT 7.6 7.3    136   K 4.0 4.4   CO2 22* 21*    104   BUN 16 15   CREATININE 0.8 0.8   ALKPHOS 105 98   ALT 53* 65*   AST 35 49*   BILITOT 1.1* 1.5*      Coagulation:   No results for input(s): PT, INR, APTT in the last 168 hours.  LDH:  No results for input(s): LDH in the last 72 hours.  Microbiology:  Microbiology Results (last 7 days)       ** No results found for the last 168 hours. **            I have reviewed all pertinent labs within the past 24 hours.    Estimated Creatinine Clearance: 94.5 mL/min (based on SCr of 0.8 mg/dL).    Diagnostic Results:  I have reviewed and interpreted all pertinent imaging results/findings within the past 24 hours.

## 2023-03-31 ENCOUNTER — RESEARCH ENCOUNTER (OUTPATIENT)
Dept: RESEARCH | Facility: HOSPITAL | Age: 33
End: 2023-03-31
Payer: COMMERCIAL

## 2023-03-31 VITALS
HEART RATE: 71 BPM | RESPIRATION RATE: 14 BRPM | OXYGEN SATURATION: 100 % | BODY MASS INDEX: 34.34 KG/M2 | DIASTOLIC BLOOD PRESSURE: 68 MMHG | HEIGHT: 60 IN | TEMPERATURE: 98 F | SYSTOLIC BLOOD PRESSURE: 113 MMHG | WEIGHT: 174.94 LBS

## 2023-03-31 LAB
ALBUMIN SERPL BCP-MCNC: 3.3 G/DL (ref 3.5–5.2)
ALP SERPL-CCNC: 90 U/L (ref 55–135)
ALT SERPL W/O P-5'-P-CCNC: 65 U/L (ref 10–44)
ANA PATTERN 1: NORMAL
ANA SER QL IF: POSITIVE
ANA TITR SER IF: NORMAL {TITER}
ANION GAP SERPL CALC-SCNC: 9 MMOL/L (ref 8–16)
AST SERPL-CCNC: 45 U/L (ref 10–40)
BILIRUB SERPL-MCNC: 1.4 MG/DL (ref 0.1–1)
BUN SERPL-MCNC: 13 MG/DL (ref 6–20)
CALCIUM SERPL-MCNC: 9.2 MG/DL (ref 8.7–10.5)
CHLORIDE SERPL-SCNC: 103 MMOL/L (ref 95–110)
CO2 SERPL-SCNC: 22 MMOL/L (ref 23–29)
CREAT SERPL-MCNC: 0.8 MG/DL (ref 0.5–1.4)
EST. GFR  (NO RACE VARIABLE): >60 ML/MIN/1.73 M^2
GLUCOSE SERPL-MCNC: 93 MG/DL (ref 70–110)
MAGNESIUM SERPL-MCNC: 2 MG/DL (ref 1.6–2.6)
MITOCHONDRIA AB TITR SER IF: NORMAL {TITER}
POTASSIUM SERPL-SCNC: 3.7 MMOL/L (ref 3.5–5.1)
PROT SERPL-MCNC: 7.1 G/DL (ref 6–8.4)
SMOOTH MUSCLE AB TITR SER IF: NORMAL {TITER}
SODIUM SERPL-SCNC: 134 MMOL/L (ref 136–145)

## 2023-03-31 PROCEDURE — 36415 COLL VENOUS BLD VENIPUNCTURE: CPT | Performed by: NURSE PRACTITIONER

## 2023-03-31 PROCEDURE — 25000003 PHARM REV CODE 250: Performed by: NURSE PRACTITIONER

## 2023-03-31 PROCEDURE — 83735 ASSAY OF MAGNESIUM: CPT | Performed by: NURSE PRACTITIONER

## 2023-03-31 PROCEDURE — 25000003 PHARM REV CODE 250: Performed by: INTERNAL MEDICINE

## 2023-03-31 PROCEDURE — 63600175 PHARM REV CODE 636 W HCPCS: Performed by: INTERNAL MEDICINE

## 2023-03-31 PROCEDURE — 80053 COMPREHEN METABOLIC PANEL: CPT | Performed by: NURSE PRACTITIONER

## 2023-03-31 PROCEDURE — 99233 SBSQ HOSP IP/OBS HIGH 50: CPT | Mod: ,,, | Performed by: INTERNAL MEDICINE

## 2023-03-31 PROCEDURE — 99233 PR SUBSEQUENT HOSPITAL CARE,LEVL III: ICD-10-PCS | Mod: ,,, | Performed by: INTERNAL MEDICINE

## 2023-03-31 PROCEDURE — 94761 N-INVAS EAR/PLS OXIMETRY MLT: CPT

## 2023-03-31 RX ORDER — TRAMADOL HYDROCHLORIDE 50 MG/1
50 TABLET ORAL EVERY 6 HOURS PRN
Qty: 30 TABLET | Refills: 2 | Status: SHIPPED | OUTPATIENT
Start: 2023-03-31 | End: 2023-03-31 | Stop reason: SDUPTHER

## 2023-03-31 RX ORDER — ONDANSETRON 8 MG/1
8 TABLET, ORALLY DISINTEGRATING ORAL EVERY 8 HOURS PRN
Qty: 90 TABLET | Refills: 3 | Status: SHIPPED | OUTPATIENT
Start: 2023-03-31 | End: 2023-04-30

## 2023-03-31 RX ORDER — TRAMADOL HYDROCHLORIDE 50 MG/1
50 TABLET ORAL EVERY 6 HOURS PRN
Qty: 24 TABLET | Refills: 0 | Status: SHIPPED | OUTPATIENT
Start: 2023-03-31 | End: 2023-09-19

## 2023-03-31 RX ORDER — BUTALBITAL, ACETAMINOPHEN AND CAFFEINE 50; 325; 40 MG/1; MG/1; MG/1
1 TABLET ORAL EVERY 4 HOURS PRN
Qty: 30 TABLET | Refills: 1 | Status: SHIPPED | OUTPATIENT
Start: 2023-03-31 | End: 2023-04-30

## 2023-03-31 RX ADMIN — ONDANSETRON 4 MG: 2 INJECTION INTRAMUSCULAR; INTRAVENOUS at 02:03

## 2023-03-31 RX ADMIN — BUTALBITAL, ACETAMINOPHEN, AND CAFFEINE 1 TABLET: 325; 50; 40 TABLET ORAL at 07:03

## 2023-03-31 RX ADMIN — FUROSEMIDE 40 MG: 40 TABLET ORAL at 07:03

## 2023-03-31 RX ADMIN — LEVOTHYROXINE SODIUM 175 MCG: 125 TABLET ORAL at 05:03

## 2023-03-31 RX ADMIN — SPIRONOLACTONE 25 MG: 25 TABLET, FILM COATED ORAL at 07:03

## 2023-03-31 RX ADMIN — BUTALBITAL, ACETAMINOPHEN, AND CAFFEINE 1 TABLET: 325; 50; 40 TABLET ORAL at 02:03

## 2023-03-31 NOTE — NURSING
Pt transition to home subcutaneous remodulin pump at 1155. Iv remodulin was stopped. She has done well. Midline iv d/jayro with cath tip intact. Peripheral iv d/jayro with cath tip intact. AVS reviewed with patient she voiced understanding of meds, follow up appts, what to call for, and [facility] on call number. Pt waiting on bedside delivery and then with leave with .

## 2023-03-31 NOTE — NURSING
Remodulin titrated up to 12 ng/kg/min (47 cc/24h). Monitoring vitals q15 min for one hour. Pt had nausea and HA a hour before titration but resolved with PRN medications. Will continue to monitor.

## 2023-03-31 NOTE — SUBJECTIVE & OBJECTIVE
Interval History:   No acute overnight events   Patient reported headache 7/10 last night and was also nauseous, however feels better with tylenol and zofran respectively.     Continuous Infusions:   treprostinil (REMODULIN) infusion 12 ng/kg/min (03/31/23 0501)    veletri/remodulin cassette      veletri/remodulin tubing       Scheduled Meds:   furosemide  40 mg Oral Daily    levothyroxine  175 mcg Oral Before breakfast    macitentan  10 mg Oral QHS    melatonin  9 mg Oral Nightly    norgestimate-ethinyl estradioL  1 tablet Oral QHS    pregabalin  50 mg Oral QHS    spironolactone  25 mg Oral Daily     PRN Meds:butalbital-acetaminophen-caffeine -40 mg, HYDROcodone-acetaminophen, loperamide, ondansetron, ondansetron, sodium chloride 0.9%, traMADoL    Review of patient's allergies indicates:   Allergen Reactions    Amoxicillin     Penicillins      Objective:     Vital Signs (Most Recent):  Temp: 98.2 °F (36.8 °C) (03/31/23 0742)  Pulse: 74 (03/31/23 1049)  Resp: 16 (03/31/23 0742)  BP: (!) 108/57 (03/31/23 0742)  SpO2: 95 % (03/31/23 0742)   Vital Signs (24h Range):  Temp:  [97.4 °F (36.3 °C)-98.3 °F (36.8 °C)] 98.2 °F (36.8 °C)  Pulse:  [] 74  Resp:  [14-20] 16  SpO2:  [95 %-100 %] 95 %  BP: ()/(57-74) 108/57     Patient Vitals for the past 72 hrs (Last 3 readings):   Weight   03/31/23 0321 79.3 kg (174 lb 15 oz)   03/30/23 0304 79.9 kg (176 lb 0.6 oz)   03/29/23 0327 80.9 kg (178 lb 5.6 oz)     Body mass index is 34.16 kg/m².      Intake/Output Summary (Last 24 hours) at 3/31/2023 1109  Last data filed at 3/31/2023 0900  Gross per 24 hour   Intake 1020 ml   Output 1500 ml   Net -480 ml       Hemodynamic Parameters:           Physical Exam  Vitals and nursing note reviewed.   Constitutional:       Appearance: She is well-developed.   HENT:      Head: Normocephalic.   Eyes:      Pupils: Pupils are equal, round, and reactive to light.   Cardiovascular:      Rate and Rhythm: Normal rate and regular  rhythm.   Pulmonary:      Effort: Pulmonary effort is normal.      Breath sounds: Normal breath sounds.   Abdominal:      General: Bowel sounds are normal.      Palpations: Abdomen is soft.   Musculoskeletal:         General: Normal range of motion.      Cervical back: Normal range of motion and neck supple.   Skin:     General: Skin is warm and dry.   Neurological:      Mental Status: She is alert and oriented to person, place, and time.   Psychiatric:         Behavior: Behavior normal.       Significant Labs:  CBC:  Recent Labs   Lab 03/29/23  0656   WBC 7.75   RBC 4.75   HGB 11.0*   HCT 35.8*      MCV 75*   MCH 23.2*   MCHC 30.7*     BNP:  No results for input(s): BNP in the last 168 hours.    Invalid input(s): BNPTRIAGELBLO  CMP:  Recent Labs   Lab 03/29/23  0656 03/30/23  0640 03/31/23  0629   GLU 89 83 93   CALCIUM 9.1 9.3 9.2   ALBUMIN 3.5 3.3* 3.3*   PROT 7.6 7.3 7.1    136 134*   K 4.0 4.4 3.7   CO2 22* 21* 22*    104 103   BUN 16 15 13   CREATININE 0.8 0.8 0.8   ALKPHOS 105 98 90   ALT 53* 65* 65*   AST 35 49* 45*   BILITOT 1.1* 1.5* 1.4*      Coagulation:   No results for input(s): PT, INR, APTT in the last 168 hours.  LDH:  No results for input(s): LDH in the last 72 hours.  Microbiology:  Microbiology Results (last 7 days)       ** No results found for the last 168 hours. **            I have reviewed all pertinent labs within the past 24 hours.    Estimated Creatinine Clearance: 94.2 mL/min (based on SCr of 0.8 mg/dL).    Diagnostic Results:  I have reviewed and interpreted all pertinent imaging results/findings within the past 24 hours.

## 2023-03-31 NOTE — PLAN OF CARE
AAOx3, afebrile, c/o HA and nausea. PRN medication given. Telemetry monitor in place (NSR). Remodulin currently infusing @ 11 ng/kg/min, 43 cc/24hr, DW 82kg.Plan to titrate again @0330 this am. Up titrate by 1ng/kg/min q6h per orders. US of liver done last night. Pt is on her period. Pt able to position self independently.  Pt in lowest position, side rails up x2, non-skid foot wear in place, call light within reach, pt verbalized understanding to call RN when needed.  Hand hygiene practiced per protocol.  Will continue to monitor.

## 2023-03-31 NOTE — DISCHARGE SUMMARY
[facility]  Heart Transplant  Discharge Summary      Patient Name: Mya Sarmiento  MRN: [id number]  Admission Date: 3/28/2023  Hospital Length of Stay: 3 days  Discharge Date and Time: 03/31/2023 1:57 PM  Attending Physician: Sathish Woodward MD   Discharging Provider: Acacia Ugalde MD  Primary Care Provider: Primary Doctor No     HPI: 33 y/o female with thyroid disease, obesity, ovarian cysts, and severe PAH by RHC performed on 12/1/2022. L/RHC that was consistent with severe PAH and no CAD noted. She is being directly admitted for initiation/uptitration of IV remodulin. She will be transitioned to SC prior to D/C. Denies CP, palpitations, syncope, presyncope, fevers, n/v/d, PND, orthopnea.         * No surgery found *     Hospital Course: 33 y/o female with thyroid disease, obesity, ovarian cysts, and severe PAH by RHC performed on 12/1/2022. L/RHC that was consistent with severe PAH and no CAD noted. She was directly admitted for initiation/uptitration of IV remodulin. Tolerated medication well and is being discharged on SubQ Remodulin along with macitentan 10 mg PO. To be followed in PH clinic in 1 - 2 weeks       Goals of Care Treatment Preferences:  Code Status: Full Code      Consults (From admission, onward)        Status Ordering Provider     Inpatient consult to Midline team  Once        Provider:  (Not yet assigned)    Completed SATHISH WOODWARD          Significant Diagnostic Studies: Labs: All labs within the past 24 hours have been reviewed    Pending Diagnostic Studies:     Procedure Component Value Units Date/Time    NEREYDA [[id number]] Collected: 03/30/23 1017    Order Status: Sent Lab Status: In process Updated: 03/30/23 1033    Specimen: Blood     Anti-smooth muscle antibody [[id number]] Collected: 03/30/23 1017    Order Status: Sent Lab Status: In process Updated: 03/30/23 1033    Specimen: Blood     Antimitochondrial antibody [[id number]] Collected: 03/30/23 1017    Order  Status: Sent Lab Status: In process Updated: 03/30/23 1033    Specimen: Blood         Final Active Diagnoses:    Diagnosis Date Noted POA    PRINCIPAL PROBLEM:  Pulmonary hypertension [I27.20] 12/01/2022 Yes      Problems Resolved During this Admission:      Discharged Condition: good    Disposition: Home or Self Care    Follow Up: PH clinic in 1- 2 weeks     Patient Instructions:   No discharge procedures on file.  Medications:  Reconciled Home Medications:      Medication List      START taking these medications    butalbital-acetaminophen-caffeine -40 mg -40 mg per tablet  Commonly known as: FIORICET, ESGIC  Take 1 tablet by mouth every 4 (four) hours as needed for Headaches.     ondansetron 8 MG Tbdl  Commonly known as: ZOFRAN-ODT  Dissolve 1 tablet (8 mg total) by mouth every 8 (eight) hours as needed (nausea).     traMADoL 50 mg tablet  Commonly known as: ULTRAM  Take 1 tablet (50 mg total) by mouth every 6 (six) hours as needed for Pain.        CONTINUE taking these medications    furosemide 40 MG tablet  Commonly known as: LASIX  Take 1 tablet (40 mg total) by mouth once daily.     levothyroxine 175 MCG tablet  Commonly known as: SYNTHROID, LEVOTHROID  Take 175 mcg by mouth once daily.     macitentan 10 mg Tab  Take 10 mg by mouth once daily.     norgestimate-ethinyl estradioL 0.18/0.215/0.25 mg-35 mcg (28) tablet  Commonly known as: ORTHO TRI-CYCLEN,TRI-SPRINTEC  Take 1 tablet by mouth once daily.     pregabalin 50 MG capsule  Commonly known as: LYRICA  Take 50 mg by mouth every evening.     spironolactone 25 MG tablet  Commonly known as: ALDACTONE  Take 1 tablet (25 mg total) by mouth once daily.            Acacia Ugalde MD  Heart Transplant  [facility] - [facility]

## 2023-03-31 NOTE — NURSING
Remodulin titrated up to 11ng/kg/min (43cc/24hr). Monitoring vitals q15min for one hour. Educated pt to call if any changes or symptoms occur. Will continue to monitor.

## 2023-03-31 NOTE — PROGRESS NOTES
DISCHARGE    SW to pt's room for discharge today.  Pt presents as AAO x4, calm, cooperative, and asking and answering questions appropriately.  Pt verbalizes understanding and agreement with plan for d/c to home today.  Pt reports her  will transport her home today.  Pt denies any d/c needs, and none identified by medical team.  Pt reports coping well at this time, and denies any needs or concerns to SW.  SW providing ongoing psychosocial and counseling support, education, resources, assistance, and discharge planning as indicated.  SW remains available.

## 2023-03-31 NOTE — PROGRESS NOTES
[facility] - Trans[facility] Stepdown  Heart Transplant  Progress Note    Patient Name: Mya Sarmiento  MRN: [id number]  Admission Date: 3/28/2023  Hospital Length of Stay: 3 days  Attending Physician: Vito Woodward MD  Primary Care Provider: Primary Doctor No  Principal Problem:Pulmonary hypertension    Subjective:     Interval History:   No acute overnight events   Patient reported headache 7/10 last night and was also nauseous, however feels better with tylenol and zofran respectively.     Continuous Infusions:   treprostinil (REMODULIN) infusion 12 ng/kg/min (03/31/23 0501)    veletri/remodulin cassette      veletri/remodulin tubing       Scheduled Meds:   furosemide  40 mg Oral Daily    levothyroxine  175 mcg Oral Before breakfast    macitentan  10 mg Oral QHS    melatonin  9 mg Oral Nightly    norgestimate-ethinyl estradioL  1 tablet Oral QHS    pregabalin  50 mg Oral QHS    spironolactone  25 mg Oral Daily     PRN Meds:butalbital-acetaminophen-caffeine -40 mg, HYDROcodone-acetaminophen, loperamide, ondansetron, ondansetron, sodium chloride 0.9%, traMADoL    Review of patient's allergies indicates:   Allergen Reactions    Amoxicillin     Penicillins      Objective:     Vital Signs (Most Recent):  Temp: 98.2 °F (36.8 °C) (03/31/23 0742)  Pulse: 74 (03/31/23 1049)  Resp: 16 (03/31/23 0742)  BP: (!) 108/57 (03/31/23 0742)  SpO2: 95 % (03/31/23 0742)   Vital Signs (24h Range):  Temp:  [97.4 °F (36.3 °C)-98.3 °F (36.8 °C)] 98.2 °F (36.8 °C)  Pulse:  [] 74  Resp:  [14-20] 16  SpO2:  [95 %-100 %] 95 %  BP: ()/(57-74) 108/57     Patient Vitals for the past 72 hrs (Last 3 readings):   Weight   03/31/23 0321 79.3 kg (174 lb 15 oz)   03/30/23 0304 79.9 kg (176 lb 0.6 oz)   03/29/23 0327 80.9 kg (178 lb 5.6 oz)     Body mass index is 34.16 kg/m².      Intake/Output Summary (Last 24 hours) at 3/31/2023 1109  Last data filed at 3/31/2023 0900  Gross per 24 hour   Intake 1020 ml   Output 1500 ml    Net -480 ml       Hemodynamic Parameters:           Physical Exam  Vitals and nursing note reviewed.   Constitutional:       Appearance: She is well-developed.   HENT:      Head: Normocephalic.   Eyes:      Pupils: Pupils are equal, round, and reactive to light.   Cardiovascular:      Rate and Rhythm: Normal rate and regular rhythm.   Pulmonary:      Effort: Pulmonary effort is normal.      Breath sounds: Normal breath sounds.   Abdominal:      General: Bowel sounds are normal.      Palpations: Abdomen is soft.   Musculoskeletal:         General: Normal range of motion.      Cervical back: Normal range of motion and neck supple.   Skin:     General: Skin is warm and dry.   Neurological:      Mental Status: She is alert and oriented to person, place, and time.   Psychiatric:         Behavior: Behavior normal.       Significant Labs:  CBC:  Recent Labs   Lab 03/29/23  0656   WBC 7.75   RBC 4.75   HGB 11.0*   HCT 35.8*      MCV 75*   MCH 23.2*   MCHC 30.7*     BNP:  No results for input(s): BNP in the last 168 hours.    Invalid input(s): BNPTRIAGELBLO  CMP:  Recent Labs   Lab 03/29/23  0656 03/30/23  0640 03/31/23  0629   GLU 89 83 93   CALCIUM 9.1 9.3 9.2   ALBUMIN 3.5 3.3* 3.3*   PROT 7.6 7.3 7.1    136 134*   K 4.0 4.4 3.7   CO2 22* 21* 22*    104 103   BUN 16 15 13   CREATININE 0.8 0.8 0.8   ALKPHOS 105 98 90   ALT 53* 65* 65*   AST 35 49* 45*   BILITOT 1.1* 1.5* 1.4*      Coagulation:   No results for input(s): PT, INR, APTT in the last 168 hours.  LDH:  No results for input(s): LDH in the last 72 hours.  Microbiology:  Microbiology Results (last 7 days)       ** No results found for the last 168 hours. **            I have reviewed all pertinent labs within the past 24 hours.    Estimated Creatinine Clearance: 94.2 mL/min (based on SCr of 0.8 mg/dL).    Diagnostic Results:  I have reviewed and interpreted all pertinent imaging results/findings within the past 24 hours.    Assessment and Plan:      31 y/o female with thyroid disease, obesity, ovarian cysts, and severe PAH by RHC performed on 12/1/2022. L/RHC that was consistent with severe PAH and no CAD noted. She is being directly admitted for initiation/uptitration of IV remodulin. She will be transitioned to SC prior to D/C. Denies CP, palpitations, syncope, presyncope, fevers, n/v/d, PND, orthopnea.         * Pulmonary hypertension  -RHC 1/23: RA 24 PA 94/40 (58),  PCWP 12, CO 4.8 l/min CI 2.l/min/m2  -Continue macitentan.   -Cont IV remodulin per dosing sheet and up-titrate as tolerated. Will transition to SC @ D/C.      Zofran on discharge for nausea   Plan to DC home today after transition to SUBQ remodulin     Acacia Ugalde MD  Heart Transplant  [facility] - [facility]

## 2023-03-31 NOTE — PROGRESS NOTES
Met with patient today 3/31/2023 regarding participation in the Cereno [id number] research study. In depth review of study (CardioMEMS implant and study drug) given by Dr. Rolon. CRC then briefly reviewed additional study information with patient including eligibility, CardioMEMS device and investigational drug, and follow-up. Explained that patient was tentatively eligible for study screening in ~90 days given recent initiation of Remodulin. Patient expressed interest in participation. Patient given a copy of consent form for self-review as well as CardioMEMS information. Will touch base with patient at next clinic follow-up visit to answer any questions and follow patient's status. Patient in agreement with plan. Contact information given to patient and instructed to call with any questions. Complete understanding verbalized. Will follow.

## 2023-03-31 NOTE — HOSPITAL COURSE
33 y/o female with thyroid disease, obesity, ovarian cysts, and severe PAH by RHC performed on 12/1/2022. L/RHC that was consistent with severe PAH and no CAD noted. She was directly admitted for initiation/uptitration of IV remodulin. Tolerated medication well and is being discharged on SubQ Remodulin along with macitentan 10 mg PO. To be followed in PH clinic in 1 - 2 weeks

## 2023-03-31 NOTE — TREATMENT PLAN
Patient currently at 12ngs IV Remodulin    Plan is to switch Remodulin to 12.2ngs SQ (stop IV and start SQ) once IRENE Padgett RN is present for education with the patient     Nursing to withdraw blood then pull midline after conversion    Monitor patient ~2 hours after conversion and then discharge if medically stable    Discussed with bedside, MD, IRENE Padgett                               HOME SQ DOSING SHEET to be used for QUICK SWITCH just before discharge

## 2023-04-03 ENCOUNTER — TELEPHONE (OUTPATIENT)
Dept: TRANSPLANT | Facility: CLINIC | Age: 33
End: 2023-04-03
Payer: COMMERCIAL

## 2023-04-03 LAB
ANTI SM ANTIBODY: 0.08 RATIO (ref 0–0.99)
ANTI SM/RNP ANTIBODY: 0.11 RATIO (ref 0–0.99)
ANTI-SM INTERPRETATION: NEGATIVE
ANTI-SM/RNP INTERPRETATION: NEGATIVE
ANTI-SSA ANTIBODY: 0.08 RATIO (ref 0–0.99)
ANTI-SSA INTERPRETATION: NEGATIVE
ANTI-SSB ANTIBODY: 0.08 RATIO (ref 0–0.99)
ANTI-SSB INTERPRETATION: NEGATIVE
DSDNA AB SER-ACNC: NORMAL [IU]/ML

## 2023-04-03 RX ORDER — TREPROSTINIL 2.5 MG/ML
INJECTION, SOLUTION INTRAVENOUS; SUBCUTANEOUS CONTINUOUS
COMMUNITY
Start: 2023-03-28

## 2023-04-03 NOTE — TELEPHONE ENCOUNTER
Pulmonary Hypertension Clinic phone follow up call completed.     Pulmonary Hypertension RN Navigator spoke with Mya Sarmiento      Primary concern: Discharge from hospital to home - parenteral treprostinil initiation - SQ.    Pt reports the following:  []  Shortness of Breath with Activity  []  Shortness of Breath at rest   []  Fatigue  []  Edema   [] Chest pain or tightness  [] Weight Increase since discharge  [x] None of the above      Medications:   Medication compliance reviewed with pt.  Pt reports having medication list available and has all medications at home for use per list.   Specialty Pharmacy: [facility]  Specific Pulmonary Hypertension Medications  MEDICATION Current Tried/Failed   Adcirca/tadalafil [] []   Adempas® (riociguat) Tablets [] []   Epoprostenol [] []   Flolan® (epoprostenol sodium) for Injection [] []   Letairis® (ambrisentan) Tablets [] []   Opsumit® (macitentan) Tablets [x] []   Orenitram® (treprostinil) Extended-Release Tablets [] []   Remodulin® (treprostinil) Injection (SQ Remunity) [x] []   Revatio/sildenafil [] []   Tracleer® (bosentan) Tablets [] []   Tyvaso® (treprostinil) Inhalation Solution [] []   Uptravi® (selexipag) Tablets [] []   Veletri® (epoprostenol) for Injection [] []   Ventavis® (iloprost) Inhalation Solution [] []     Oxygen use interview:    [x]  NO   [] YES    LITER FLOW ________  [] ATC  [] WITH ACTIVITY  [] AT NIGHT  Notes:      IV/SC infusion interview:    PUMP SETTINGS: TYPE CADD MS 3__  DOSE:12.2 ng/kg/min RATE   [] FEVER  [] REDNESS  [] PAIN  [] WARMTH  []  DISCHARGE    DESCRIBE ___________________________________________________  [x] DRESSING, C/D/I  [x]  OTHER  Patient reports itching with site that is relieved by cold pack.    NEXT Cartridge change will be Tuesday 4/4/2023 and new dose will be 14.23 ng/kg/min      Education:   Discussed the following information with the patient: Medications and questions.    Patient inquired when the  next appointment will be and when she will be coming back to the clinic.  Advised patient that NN would be checking in periodically, and that she will continue to go up in her dose of treprostinil SQ. Discussed with patient that after she gets to the 40 ng/kg/min that she would get an echocardiogram to determine how well the treprostinil therapy is going.      Pt reminded of how and when to contact [facility]:  [phone] (Mon-Fri, 8a-5p) & for urgent issues on the weekend to page the Heart Transplant MD on call.  Pt also encouraged utilize [facility] messaging as well.      Pt Mya Sarmiento verbalized understanding and in agreement of plan.       Will follow up with pt on Thursday 4/6/2023 and RN navigator available for pt questions, issues or concerns.

## 2023-04-03 NOTE — TELEPHONE ENCOUNTER
"Message received from Specialty Pharmacy nurse:     Sent 4/1/2023.    "Mrs. Mya Sarmiento was seen at her home this morning for a post discharge follow up and to observe a cassette change. She has a 9 mm VINCENT inserted to her RUQ with minimal redness noted. Mrs. Sarmiento has Remodulin infusing via Remunity pump at 12.20 nkm with a rate of 24 uL/hr. She has no c/o side effects or site pain currently.  I observed her perform a cassette change, program Remunity, and connect to her site independently. She maintained aseptic technique throughout the procedure and was competent in the process. She has been instructed to titrate by 2.03 nkm every 70 hours per dosing sheet.  When she gets to a dose of 16.26 nkm she will change Remodulin concentration to 5 mg/ml vial.  Mrs. Sarmiento has my contact information along with the pharmacy for any future questions or concerns.  /78, pulse 79, resp 18, temp 97.6, O2 sat 100% on RA. Weight 175 lbs with no edema observed.    Thanks,    Aubrey Arita RN  [facility], Clinical Nurse Services Specialist  p [phone]  f [phone]  c [phone]  [address] [place] [address]. [id number]- [email or web address]  [facility]"    "

## 2023-04-03 NOTE — TELEPHONE ENCOUNTER
"NN contacted patient to do follow up after discharge from the hospital on 3/31/2023.  Patient states that she did get all of her medications delivered bedside. Reports site itchiness that was relieved by cold pack, and a few headaches that she reports as "not so bad" that were relieved by Tylenol.  Advised patient that she can reach out to us through the portal and calling, and that NN would reach out again on Thursday. Patient acknowledged.  "

## 2023-04-06 NOTE — PHYSICIAN QUERY
PT Name: Mya Sarmiento  MR #: [id number]    DOCUMENTATION CLARIFICATION      CDS/: Riley Archuleta, FELIN, RN, CCDS               Contact information: [email or web address]    This form is a permanent document in the medical record.    Query Date: April 6, 2023    By submitting this query, we are merely seeking further clarification of documentation. Please utilize your independent clinical judgment when addressing the question(s) below.    The Medical Record contains the following:   Indicators   Supporting Clinical Findings Location in Medical Record   X Pulmonary Hypertension documented Pulmonary hypertension  -RHC 1/23: RA 24 PA 94/40 (58),  PCWP 12, CO 4.8 l/min CI 2.l/min/m2  -Continue macitentan.   -Cont IV remodulin per dosing sheet and up-titrate as tolerated. Will transition to SC @ D/C.    PRINCIPAL PROBLEM:  Pulmonary hypertension    DCS: Dr. Woodward 3/31   X Acute/Chronic Illness PMH: thyroid disease, obesity, ovarian cysts, and severe PAH by RHC performed on 12/1/2022     L/RHC that was consistent with severe PAH and no CAD noted H & P: Dr. Woodward 3/28    Echo/Heart Cath Findings      BiPAP/Intubation/Supplemental O2      SOB, RAMIREZ, Fatigue, Dizziness, LE Edema, Cyanosis, Chest Pain, Respiratory Distress, Hypoxia, etc.     X Treatment being directly admitted for initiation/uptitration of IV remodulin. She will be transitioned to SC prior to D/C   H & P: Dr. Woodward 3/28    Other       Provider, please specify the type of pulmonary hypertension:  [ x  ] Group 1: Pulmonary Arterial Hypertension - includes Primary, Idiopathic, Inheritable   [   ] Group 1: Pulmonary Arterial Hypertension - Secondary Pulmonary Arterial Hypertension (due to drugs, toxins, congenital heart disease, HIV infection, etc.)   [   ] Pulmonary Hypertension, unspecified   [   ] Other Cardiopulmonary Condition (please specify): _______     Please document in your progress notes daily for the duration of treatment, until resolved, and  include in your discharge summary.    Reference:    ICD-10-CM/PCS Coding Clinic Fourth Quarter ICD-10, Effective with discharges: October 1, 2017 [facility] § Pulmonary Hypertension (2017).    Form No. [id number]

## 2023-04-10 ENCOUNTER — LAB VISIT (OUTPATIENT)
Dept: LAB | Facility: HOSPITAL | Age: 33
End: 2023-04-10
Payer: COMMERCIAL

## 2023-04-10 DIAGNOSIS — Z32.00 ENCOUNTER FOR PREGNANCY TEST, RESULT UNKNOWN: ICD-10-CM

## 2023-04-10 LAB — B-HCG UR QL: NEGATIVE

## 2023-04-10 PROCEDURE — 81025 URINE PREGNANCY TEST: CPT

## 2023-04-12 ENCOUNTER — TELEPHONE (OUTPATIENT)
Dept: TRANSPLANT | Facility: CLINIC | Age: 33
End: 2023-04-12
Payer: COMMERCIAL

## 2023-04-12 NOTE — TELEPHONE ENCOUNTER
"NN contacted patient to discuss her medication Remodulin/Remunity.  Patient's next dose is 20.33 ng/kgmin and she will start the cartridge tonight.  Patient states that she is not having many side effects.  When she has a headache, "I take my medicine and then go relax." Patient states that she does not really feel the site pain.  NN advised patient that provider CRISTAL Carvalho would be notified, and that it will be communicated with the Specialty Pharmacy if the next dosing sheet should be released. Patient verbalized understanding.  "

## 2023-05-02 ENCOUNTER — PATIENT MESSAGE (OUTPATIENT)
Dept: TRANSPLANT | Facility: CLINIC | Age: 33
End: 2023-05-02
Payer: COMMERCIAL

## 2023-05-04 ENCOUNTER — TELEPHONE (OUTPATIENT)
Dept: TRANSPLANT | Facility: CLINIC | Age: 33
End: 2023-05-04
Payer: COMMERCIAL

## 2023-05-04 NOTE — TELEPHONE ENCOUNTER
Patient called nurse navigator to discuss recent changes to her insurance. Per patient, her insurance [employer] is sending employees with medications that cost greater to $1500 o a new program called [facility]. [facility] would not [facility] remodulin without a copay card starting next month. CLAUDY Rivas has been in touch with patient and AMALIA about paperwork. A message was sent to [facility] about remodulin and seems like copay card is already on file and information was already provided to [facility]. Patient was made aware and was very grateful. She call back if there are any continued issues with insurance.

## 2023-05-04 NOTE — TELEPHONE ENCOUNTER
Other NN received call from patient concerning her Remodulin IV medication. Patient states that her insurance company is telling her that she needs to get a co-pay card for her Remodulin.  Patient states that she is working with a company that is trying her to help her - called [employer].  NN had been receiving fax communication from this same company and Mrs. Sarmiento concerning the medication Opsumit. NN faxed and emailed paperwork from [employer] several times in the last week.  Message sent to specialty pharmacy liaison and left message for  liaison.  Will continue to document.

## 2023-05-15 ENCOUNTER — LAB VISIT (OUTPATIENT)
Dept: LAB | Facility: HOSPITAL | Age: 33
End: 2023-05-15
Payer: COMMERCIAL

## 2023-05-15 DIAGNOSIS — Z32.00 ENCOUNTER FOR PREGNANCY TEST, RESULT UNKNOWN: ICD-10-CM

## 2023-05-15 LAB — B-HCG UR QL: NEGATIVE

## 2023-05-15 PROCEDURE — 81025 URINE PREGNANCY TEST: CPT

## 2023-05-23 ENCOUNTER — DOCUMENTATION ONLY (OUTPATIENT)
Dept: TRANSPLANT | Facility: CLINIC | Age: 33
End: 2023-05-23
Payer: COMMERCIAL

## 2023-05-23 NOTE — PROGRESS NOTES
Patient PFT results received   PFTs study date 11/03/22  Scanned into patient chart and routed to PH coordinator.

## 2023-05-31 ENCOUNTER — TELEPHONE (OUTPATIENT)
Dept: RESEARCH | Facility: HOSPITAL | Age: 33
End: 2023-05-31
Payer: COMMERCIAL

## 2023-06-01 NOTE — TELEPHONE ENCOUNTER
Patient not eligible for PHAR. She is being screened for the CERENO study per Dr. Rolon.   Sissy Kaplan/CRC

## 2023-06-12 ENCOUNTER — LAB VISIT (OUTPATIENT)
Dept: LAB | Facility: HOSPITAL | Age: 33
End: 2023-06-12
Payer: COMMERCIAL

## 2023-06-12 DIAGNOSIS — Z32.00 ENCOUNTER FOR PREGNANCY TEST, RESULT UNKNOWN: ICD-10-CM

## 2023-06-12 LAB — B-HCG UR QL: NEGATIVE

## 2023-06-12 PROCEDURE — 81025 URINE PREGNANCY TEST: CPT

## 2023-06-15 ENCOUNTER — PATIENT MESSAGE (OUTPATIENT)
Dept: TRANSPLANT | Facility: CLINIC | Age: 33
End: 2023-06-15
Payer: COMMERCIAL

## 2023-06-21 ENCOUNTER — PATIENT MESSAGE (OUTPATIENT)
Dept: TRANSPLANT | Facility: CLINIC | Age: 33
End: 2023-06-21
Payer: COMMERCIAL

## 2023-06-28 ENCOUNTER — HOSPITAL ENCOUNTER (OUTPATIENT)
Dept: PULMONOLOGY | Facility: CLINIC | Age: 33
Discharge: HOME | End: 2023-06-28
Payer: COMMERCIAL

## 2023-06-28 ENCOUNTER — OFFICE VISIT (OUTPATIENT)
Dept: TRANSPLANT | Facility: CLINIC | Age: 33
End: 2023-06-28
Payer: COMMERCIAL

## 2023-06-28 ENCOUNTER — TELEPHONE (OUTPATIENT)
Dept: TRANSPLANT | Facility: CLINIC | Age: 33
End: 2023-06-28
Payer: COMMERCIAL

## 2023-06-28 ENCOUNTER — HOSPITAL ENCOUNTER (OUTPATIENT)
Dept: CARDIOLOGY | Facility: HOSPITAL | Age: 33
Discharge: HOME | End: 2023-06-28
Payer: COMMERCIAL

## 2023-06-28 VITALS
HEIGHT: 60 IN | WEIGHT: 160.69 LBS | SYSTOLIC BLOOD PRESSURE: 117 MMHG | DIASTOLIC BLOOD PRESSURE: 74 MMHG | OXYGEN SATURATION: 97 % | BODY MASS INDEX: 31.55 KG/M2 | HEART RATE: 80 BPM

## 2023-06-28 VITALS — WEIGHT: 157 LBS | BODY MASS INDEX: 30.82 KG/M2 | HEIGHT: 60 IN

## 2023-06-28 VITALS
HEIGHT: 60 IN | SYSTOLIC BLOOD PRESSURE: 115 MMHG | WEIGHT: 174.81 LBS | BODY MASS INDEX: 34.32 KG/M2 | DIASTOLIC BLOOD PRESSURE: 65 MMHG | HEART RATE: 75 BPM

## 2023-06-28 DIAGNOSIS — E07.9 THYROID DISEASE: ICD-10-CM

## 2023-06-28 DIAGNOSIS — G62.9 NEUROPATHY: ICD-10-CM

## 2023-06-28 DIAGNOSIS — Z79.899 HIGH RISK MEDICATION USE: ICD-10-CM

## 2023-06-28 DIAGNOSIS — I27.21 WHO GROUP 1 PULMONARY ARTERIAL HYPERTENSION: Primary | ICD-10-CM

## 2023-06-28 DIAGNOSIS — I27.9 CHRONIC PULMONARY HEART DISEASE: ICD-10-CM

## 2023-06-28 DIAGNOSIS — I27.21 PULMONARY ARTERIAL HYPERTENSION: ICD-10-CM

## 2023-06-28 LAB
ASCENDING AORTA: 2.68 CM
AV INDEX (PROSTH): 0.91
AV MEAN GRADIENT: 4 MMHG
AV PEAK GRADIENT: 7 MMHG
AV VALVE AREA: 2.44 CM2
AV VELOCITY RATIO: 1.05
BSA FOR ECHO PROCEDURE: 1.83 M2
CV ECHO LV RWT: 0.35 CM
DOP CALC AO PEAK VEL: 1.36 M/S
DOP CALC AO VTI: 26.19 CM
DOP CALC LVOT AREA: 2.7 CM2
DOP CALC LVOT DIAMETER: 1.85 CM
DOP CALC LVOT PEAK VEL: 1.43 M/S
DOP CALC LVOT STROKE VOLUME: 64 CM3
DOP CALCLVOT PEAK VEL VTI: 23.82 CM
E WAVE DECELERATION TIME: 199.81 MSEC
E/A RATIO: 1.01
E/E' RATIO: 7.16 M/S
ECHO LV POSTERIOR WALL: 0.81 CM (ref 0.6–1.1)
EJECTION FRACTION: 70 %
FRACTIONAL SHORTENING: 45 % (ref 28–44)
INTERVENTRICULAR SEPTUM: 0.84 CM (ref 0.6–1.1)
LA MAJOR: 4.32 CM
LA MINOR: 4.26 CM
LA WIDTH: 2.99 CM
LEFT ATRIUM SIZE: 3.37 CM
LEFT ATRIUM VOLUME INDEX MOD: 16.1 ML/M2
LEFT ATRIUM VOLUME INDEX: 20.9 ML/M2
LEFT ATRIUM VOLUME MOD: 28.41 CM3
LEFT ATRIUM VOLUME: 36.74 CM3
LEFT INTERNAL DIMENSION IN SYSTOLE: 2.53 CM (ref 2.1–4)
LEFT VENTRICLE DIASTOLIC VOLUME INDEX: 55.76 ML/M2
LEFT VENTRICLE DIASTOLIC VOLUME: 98.13 ML
LEFT VENTRICLE MASS INDEX: 70 G/M2
LEFT VENTRICLE SYSTOLIC VOLUME INDEX: 13 ML/M2
LEFT VENTRICLE SYSTOLIC VOLUME: 22.89 ML
LEFT VENTRICULAR INTERNAL DIMENSION IN DIASTOLE: 4.62 CM (ref 3.5–6)
LEFT VENTRICULAR MASS: 123.64 G
LV LATERAL E/E' RATIO: 5.67 M/S
LV SEPTAL E/E' RATIO: 9.71 M/S
MV A" WAVE DURATION": 5.33 MSEC
MV PEAK A VEL: 0.67 M/S
MV PEAK E VEL: 0.68 M/S
MV STENOSIS PRESSURE HALF TIME: 57.94 MS
MV VALVE AREA P 1/2 METHOD: 3.8 CM2
PISA TR MAX VEL: 3.58 M/S
PULM VEIN S/D RATIO: 1.19
PV PEAK D VEL: 0.52 M/S
PV PEAK S VEL: 0.62 M/S
RA MAJOR: 4.87 CM
RA WIDTH: 4.82 CM
RIGHT VENTRICULAR AREA CHANGE (APICAL 4-CHAMBER VIEW): 19 %
RIGHT VENTRICULAR END-DIASTOLIC DIMENSION: 4.31 CM
SINUS: 2.4 CM
STJ: 2.09 CM
TDI LATERAL: 0.12 M/S
TDI SEPTAL: 0.07 M/S
TDI: 0.1 M/S
TR MAX PG: 51 MMHG
TRICUSPID ANNULAR PLANE SYSTOLIC EXCURSION: 1.59 CM

## 2023-06-28 PROCEDURE — 99999 PR PBB SHADOW E&M-EST. PATIENT-LVL IV: ICD-10-PCS | Mod: PBBFAC,,,

## 2023-06-28 PROCEDURE — 93306 TTE W/DOPPLER COMPLETE: CPT | Mod: 26,,, | Performed by: INTERNAL MEDICINE

## 2023-06-28 PROCEDURE — 94618 PULMONARY STRESS TESTING: CPT | Mod: S$GLB,,, | Performed by: INTERNAL MEDICINE

## 2023-06-28 PROCEDURE — 99214 OFFICE O/P EST MOD 30 MIN: CPT | Mod: S$GLB,,,

## 2023-06-28 PROCEDURE — 94618 PULMONARY STRESS TESTING: ICD-10-PCS | Mod: S$GLB,,, | Performed by: INTERNAL MEDICINE

## 2023-06-28 PROCEDURE — 99999 PR PBB SHADOW E&M-EST. PATIENT-LVL IV: CPT | Mod: PBBFAC,,,

## 2023-06-28 PROCEDURE — 93306 TTE W/DOPPLER COMPLETE: CPT

## 2023-06-28 PROCEDURE — 93306 ECHO (CUPID ONLY): ICD-10-PCS | Mod: 26,,, | Performed by: INTERNAL MEDICINE

## 2023-06-28 PROCEDURE — 99214 PR OFFICE/OUTPT VISIT, EST, LEVL IV, 30-39 MIN: ICD-10-PCS | Mod: S$GLB,,,

## 2023-06-28 NOTE — PATIENT INSTRUCTIONS
No medication changes today.    Will follow-up after CardioMems procedure.      Recommend 2 gram sodium restriction and 1500cc fluid restriction.  Encourage physical activity with graded exercise program.  Requested patient to weigh themselves daily, and to notify us if their weight increases by more than 3 lbs in 1 day or 5 lbs in 1 week.

## 2023-06-28 NOTE — PROGRESS NOTES
Subjective:    Patient ID:  Mya Sarmiento is a 33 y.o. female who presents for evaluation of Pulmonary Hypertension.    HPI   Mrs. Sarmiento was referred by Dr. Magana who saw patient on 12/15/2022. Mrs. Sarmiento has a history of thyroid disease, obesity, and severe PAH by RHC performed on 12/1/2022. Has a smoking history - quit 7 years ago. Was diagnosed with ovarian cysts following c/o pain in L side. Had tests done pre-procedure with ECHO concerning for severe PAH, followed by L/RHC that was consistent with severe PAH and no CAD noted. Other tests include negative V/Q and normal CXR. CT and PFTs have been completed but need to obtain.  Denies hx of heart disease or clotting disorder, illicit drug use, diet pills, CTD, or autoimmune disorders. Denies concerning family history. She has a 10 year-old son (uneventful birth) and 19 year-old stepson.  At initial clinic visit, Mrs. Sarmiento reported being generally healthy. Reported NYHA FC I symptoms. She had lost 80 lbs over the last year with diet and exercise. She exercises 3x a week for 30 minutes to an hour doing bike, swim, stepper, hula hoop.   Right heart cath in March showed severe PAH and she was directly admitted to hospital for initiation of SC remodulin therapy. She was discharged on SC Remodulin and Opsumit.    Today Mrs. Sarmiento is doing great. She reports NYHA FC II symptoms. She is tolerating SC remodulin at 40 ng and Opsumit. Has only changed sites twice. Has discomfort for a few days but that improves. Does not wear O2. Continues to exercise. Has a treadmill and she walks 10 minutes a day. Has noticed that the bottoms of her feet start hurting the more she walks. It's a neuropathic type pain. She takes lyrica 50 mg at night, but has taken more in the past. Patient denies chest pain, chest pressure, syncope, pre-syncope, lightheadedness, dizziness, PND, orthopnea, LE edema, abdominal pain, abdominal pressure, or N/V/F/C      6MWT:  6MW 6/28/2023   6MWT Status  completed without stopping   Patient Reported Dyspnea   Was O2 used? No   6MW Distance walked (feet) 1332   Distance walked (meters) 405.99   Did patient stop? No   Oxygen Saturation 98   Supplemental Oxygen Room Air   Heart Rate 89   Blood Pressure 115/70   Ayala Dyspnea Rating  nothing at all   Oxygen Saturation 95   Supplemental Oxygen Room Air   Heart Rate 130   Blood Pressure 130/70   Ayala Dyspnea Rating  very, very light (just noticeable)   Recovery Time (seconds) 91   Oxygen Saturation 97   Supplemental Oxygen Room Air   Heart Rate 97     6MW 1/10/2023   6MWT Status completed without stopping   Patient Reported Dyspnea   Was O2 used? No   6MW Distance walked (feet) 1200   Distance walked (meters) 365.76   Did patient stop? No   Oxygen Saturation 99   Supplemental Oxygen Room Air   Heart Rate 80   Blood Pressure 125/67   Ayala Dyspnea Rating  nothing at all   Oxygen Saturation 98   Supplemental Oxygen Room Air   Heart Rate 114   Blood Pressure 134/90   Ayala Dyspnea Rating  very, very light (just noticeable)   Recovery Time (seconds) 194   Oxygen Saturation 97   Supplemental Oxygen Room Air   Heart Rate 94     ECHO: 2/1/2023  The left ventricle is normal in size with concentric remodeling and normal systolic function.  The estimated ejection fraction is 60%.  Normal left ventricular diastolic function.  There is abnormal septal wall motion.  Moderate right ventricular enlargement with low normal right ventricular systolic function.  Moderate right atrial enlargement.  Mild aortic regurgitation.  Severe tricuspid regurgitation.  Intermediate central venous pressure (8 mmHg).  The estimated PA systolic pressure is 86 mmHg.  There is severe pulmonary hypertension.  Trivial pericardial effusion. Under the RA.    ECHO:           RHC: 2/1/2023  RA: 27/ 27/ 24 RV: 94/ 8/ 24 PA: 94/ 40/ 58 PWP: 10/ 27/ 12 .   Cardiac output was 4.8  by Tesfaye. Cardiac index is 2.6 L/min/m2.   O2 Sat: PA 62%.   Pulmonary vascular  resistance: 9.6 CRAWFORD.   Condition 2: Peter at 20 ppm.  PA 97/39 (62)  PCWP 15, 27 (14)    Condition 3 Peter 40 ppm.  PA 96/37 (61)  PCWP 15, 21 (14)    Condition 4: Peter 80 ppm.  PA 96/38 (61)  PCWP 15/20 (14)  CO 5.5 l/min CI 3 l/min/m2    L/RHC: 12/1/2022  Procedure:  Left heart catheterization right heart catheterization shunt run and assessment of pulmonary vasoreactivity  Pre Op Diagnosis:  Pulmonary arterial hypertension  Post Op Diagnosis:  Same  Indications:  Same  Physician: Dr. Kee Magana MD  Entry:  Left radial artery and left brachial vein  Findings:  Coronary angiography performed initially.    All coronary arteries are normal.    Normal origin of the left and right coronary ostium   Left main is widely patent  Lad and diagonal branches are widely patent   Circumflex and marginal branches are widely  Right coronary is a dominant vessel  Right coronary has a normal origin and is widely patent   The PDA and posterolateral branches are widely patent  There is no coronary fistula or coronary anomaly     LV function is normal on LV g  No gradient on pullback and no intraventricular gradient  No mitral regurgitation seen     Right heart catheterization was then performed and shunt run was performed.     Superior vena cava 71%  Right atrium 68%  Inferior vena cava 67%  Right ventricle 67%  Right ventricular outflow tract 69%   Main pulmonary artery 62%   Right upper pulmonary artery 65%  Left ventricle 94%   Aorta/subclavian 92%       LV pressure 127/20  Aortic pressure is 127/93  Wedge pressure 20   PA pressure is 110/48  RV pressure 110/36   RA pressure 40  Cardiac output 4.3 average over 3 measurements  Cardiac index 2.3     PVR = 11.8 Woods units     BSA  2.0 m2     Patient was then examined after giving supplemental oxygen for 5 minutes and there was no significant change in her pulmonary pressures.    Patient was examined after IV adenosine given at sequential dosing in 50 mcg increments peaking at a 250  microgram/kilogram per minute dosing   There does not appear to be any significant change in the pulmonary pressures indicating lack of pulmonary vaso reactivity.    V/Q SCAN: 12/13/2022  FINDINGS:  No ventilatory defects.     No perfusion defects.     Impression:     Low probability for pulmonary embolism.    CXR: 12/13/2022  FINDINGS:  Frontal chest radiograph demonstrates clear lungs.  No pleural fluid.  Cardiomediastinal silhouette is unremarkable.  No osseous abnormality.     Impression:     No evidence of cardiopulmonary disease.    PFTs: 5/22/2023  No obstruction. Mild Restriction. Reduction in DLCO (24%).     CT CHEST: 11/3/2022      Review of Systems   Constitutional: Negative.   HENT: Negative.     Eyes: Negative.    Cardiovascular: Negative.    Respiratory: Negative.     Endocrine: Negative.    Hematologic/Lymphatic: Negative.    Skin: Negative.    Musculoskeletal: Negative.    Gastrointestinal: Negative.    Genitourinary: Negative.    Neurological: Negative.    Psychiatric/Behavioral: Negative.     Allergic/Immunologic: Negative.       Objective: /74 (BP Location: Left arm, Patient Position: Sitting, BP Method: Medium (Automatic))   Pulse 80   Ht 5' (1.524 m)   Wt 72.9 kg (160 lb 11.5 oz)   SpO2 97%   BMI 31.39 kg/m²     Physical Exam  Constitutional:       General: She is not in acute distress.     Appearance: Normal appearance. She is not ill-appearing.   HENT:      Head: Normocephalic.      Nose: Nose normal.   Eyes:      Extraocular Movements: Extraocular movements intact.   Cardiovascular:      Rate and Rhythm: Normal rate and regular rhythm.      Pulses: Normal pulses.      Heart sounds: Normal heart sounds.   Abdominal:      Palpations: Abdomen is soft.   Musculoskeletal:         General: Normal range of motion.      Cervical back: Normal range of motion.   Skin:     General: Skin is warm and dry.      Capillary Refill: Capillary refill takes less than 2 seconds.   Neurological:       Mental Status: She is oriented to person, place, and time.   Psychiatric:         Mood and Affect: Mood normal.         Behavior: Behavior normal.         Lab Results   Component Value Date    BNP 58 06/28/2023     06/28/2023    K 3.8 06/28/2023    MG 2.1 06/28/2023     06/28/2023    CO2 26 06/28/2023    BUN 15 06/28/2023    CREATININE 0.8 06/28/2023    GLU 76 06/28/2023    AST 39 06/28/2023    ALT 56 (H) 06/28/2023    ALBUMIN 3.6 06/28/2023    PROT 7.9 06/28/2023    BILITOT 0.4 06/28/2023       Magnesium   Date Value Ref Range Status   06/28/2023 2.1 1.6 - 2.6 mg/dL Final       Lab Results   Component Value Date    WBC 6.38 06/28/2023    HGB 12.2 06/28/2023    HCT 39.4 06/28/2023    MCV 77 (L) 06/28/2023     06/28/2023       No results found for: INR, PROTIME    BNP   Date Value Ref Range Status   06/28/2023 58 0 - 99 pg/mL Final     Comment:     Values of less than 100 pg/ml are consistent with non-CHF populations.   01/10/2023 235 (H) 0 - 99 pg/mL Final     Comment:     Values of less than 100 pg/ml are consistent with non-CHF populations.       No results found for: LDH      ..  WHO Group: 1  Functional Class: II  REVEAL Score: 6 (Low Risk)    Assessment:       1. WHO group 1 pulmonary arterial hypertension    2. Thyroid disease    3. Neuropathy    4. High risk medication use - SC Remodulin         Plan:   Mrs. Sarmiento is doing well on current therapy - 40 ng/kg/min and Opsumit 10mg. ECHO and walk improved. Euvolemic on exam.     No medication changes today. Spoke with Dr. Rolon and Sissy VERMA about the CardioMems program.  Will plan a RHC/CardioMems in August.     No medication changes today.    Will follow-up after CardioMems procedure.      Recommend 2 gram sodium restriction and 1500cc fluid restriction.  Encourage physical activity with graded exercise program.  Requested patient to weigh themselves daily, and to notify us if their weight increases by more than 3 lbs in 1 day or 5 lbs in 1  week.

## 2023-06-28 NOTE — TELEPHONE ENCOUNTER
NN spoke to Mrs. Mya Sarmiento and her , Mr. Jeremi Sarmiento in clinic.  She states that she is doing really well and feeling really well. She is going to change her site in the next few days, and this will be her second site change since discharge date 3/31/2023.  Mrs. Sarmiento also states that she has not had to use but two tablets of tramadol for pain, that the cream she was given works well for site pain.  Confirmed dose of SQ Remodulin as 40.65 ng/kg/min.

## 2023-06-28 NOTE — PROCEDURES
Mya Sarmiento is a 33 y.o.  female patient, who presents for a 6 minute walk test ordered by JENNIFER Carvalho.  The diagnosis is Pulmonary Hypertension, Qualify for Oxygen.  The patient's BMI is 30.7 kg/m2.  Predicted distance (lower limit of normal) is 494.04 meters.      Test Results:    The test was completed without stopping.  The total time walked was 360 seconds.  During walking, the patient reported:  Dyspnea.  The patient used no assistive devices during testing.     06/28/2023---------Distance: 405.99 meters (1332 feet)     O2 Sat % Supplemental Oxygen Heart Rate Blood Pressure Ayala Scale   Pre-exercise  (Resting) 98 % Room Air 89 bpm 115/70 mmHg 0   During Exercise 95 % Room Air 130 bpm 130/70 mmHg 0.5   Post-exercise  (Recovery) 97 % Room Air  97 bpm       Recovery Time: 91 seconds    Performing nurse/tech: Pritesh LOPEZ      PREVIOUS STUDY:   01/10/2023---------Distance: 365.76 meters (1200 feet)       O2 Sat % Supplemental Oxygen Heart Rate Blood Pressure Ayala Scale   Pre-exercise  (Resting) 99 % Room Air 80 bpm 125/67 mmHg 0   During Exercise 98 % Room Air 114 bpm 134/90 mmHg 0.5   Post-exercise  (Recovery) 97 % Room Air  94 bpm 134/71 mmHg         CLINICAL INTERPRETATION:  Six minute walk distance is 405.99 meters (1332 feet) with very, very light dyspnea.  During exercise, there was no significant desaturation while breathing room air.  Blood pressure remained stable and Heart rate increased significantly with walking.  The patient did not report non-pulmonary symptoms during exercise.  Since the previous study in January 2023, exercise capacity may be somewhat improved.  Based upon age and body mass index, exercise capacity is less than predicted.

## 2023-07-06 ENCOUNTER — PATIENT MESSAGE (OUTPATIENT)
Dept: TRANSPLANT | Facility: CLINIC | Age: 33
End: 2023-07-06
Payer: COMMERCIAL

## 2023-07-07 RX ORDER — PREGABALIN 50 MG/1
100 CAPSULE ORAL NIGHTLY
Qty: 60 CAPSULE | Refills: 11 | Status: SHIPPED | OUTPATIENT
Start: 2023-07-07 | End: 2024-07-01

## 2023-07-11 ENCOUNTER — PATIENT MESSAGE (OUTPATIENT)
Dept: TRANSPLANT | Facility: CLINIC | Age: 33
End: 2023-07-11
Payer: COMMERCIAL

## 2023-07-17 ENCOUNTER — LAB VISIT (OUTPATIENT)
Dept: LAB | Facility: HOSPITAL | Age: 33
End: 2023-07-17
Payer: COMMERCIAL

## 2023-07-17 DIAGNOSIS — Z32.00 ENCOUNTER FOR PREGNANCY TEST, RESULT UNKNOWN: ICD-10-CM

## 2023-07-17 LAB — B-HCG UR QL: NEGATIVE

## 2023-07-17 PROCEDURE — 81025 URINE PREGNANCY TEST: CPT

## 2023-07-27 ENCOUNTER — PATIENT MESSAGE (OUTPATIENT)
Dept: RESEARCH | Facility: HOSPITAL | Age: 33
End: 2023-07-27
Payer: COMMERCIAL

## 2023-07-31 DIAGNOSIS — Z00.6 EXAMINATION OF PARTICIPANT IN CLINICAL TRIAL: ICD-10-CM

## 2023-07-31 DIAGNOSIS — I27.21 WHO GROUP 1 PULMONARY ARTERIAL HYPERTENSION: Primary | ICD-10-CM

## 2023-08-01 ENCOUNTER — RESEARCH ENCOUNTER (OUTPATIENT)
Dept: RESEARCH | Facility: HOSPITAL | Age: 33
End: 2023-08-01
Payer: COMMERCIAL

## 2023-08-01 DIAGNOSIS — I27.21 WHO GROUP 1 PULMONARY ARTERIAL HYPERTENSION: Primary | ICD-10-CM

## 2023-08-01 DIAGNOSIS — Z00.6 EXAMINATION OF PARTICIPANT IN CLINICAL TRIAL: ICD-10-CM

## 2023-08-01 NOTE — PROGRESS NOTES
Contacted patient today 8/1/2023 regarding scheduling [facility] CS1-003 ICF/Screening Visit (V1) as discussed with Dr. Rolon in clinic on 6/28/2023. Patient agreeable to appointments 8/10/2023 starting at 7:40 AM. Research visit scheduled at 7:40 AM for ICF review and signature; EKG scheduled at 8:45 AM; lab appointments for blood and urine specimens scheduled at 9:05 AM; clinic visit for physical exam and RRS and WHO FC assessment with Dr. Rolon at 10:00 AM; and research visit for screening procedures (6MW, VS, medical history and medication review) at 10:40 AM. Also discussed scheduling Baseline Visit (V2) and CardioMEMS implant as this visit will need to occur within 2 weeks of V1 if patient passes screening. Patient agreeable to appointments 8/18/2023 starting at 7:30 AM. EKG scheduled at 7:30 AM and lab appointments for blood and urine specimens scheduled at 7:50 AM. Patient informed that she will check in to [facility] following lab appointments and prior to procedure at 8:15 AM and RHC/implant procedure is scheduled for 10:00 AM. Physical exam with WHO FC assessment, vital signs, and review of concomitant medications and adverse events to be completed in [facility]U before procedure. Instructed patient to contact CRC if any questions arise. Complete understanding verbalized and patient in agreement with plan. Patient denies any questions or concerns at this time. Will follow.

## 2023-08-09 ENCOUNTER — RESEARCH ENCOUNTER (OUTPATIENT)
Dept: RESEARCH | Facility: HOSPITAL | Age: 33
End: 2023-08-09
Payer: COMMERCIAL

## 2023-08-09 NOTE — PROGRESS NOTES
Contacted patient today 8/9/2023 regarding scheduled [facility] CS1-003 ICF/Screening Visit (V1) tomorrow Thursday 8/10/2023 starting at 7:40 AM. Research visit scheduled at 7:40 AM for ICF review and signature; EKG scheduled at 8:45 AM; lab appointments for blood and urine specimens scheduled at 9:05 AM; clinic visit for physical exam and RRS and WHO FC assessment with Dr. Rolon at 10:00 AM; and research visit for screening procedures (6MW, VS, medical history and medication review) scheduled at 10:40 AM. Patient asked if she could take her morning medications per her usual routine on day of visit. Instructed patient that holding medications before labs is not necessary, but to take medications with a small sip of water only since she will be fasting. Complete understanding verbalized and patient in agreement with plan. Patient denies any further questions or concerns at this time. Will follow.

## 2023-08-10 ENCOUNTER — OFFICE VISIT (OUTPATIENT)
Dept: TRANSPLANT | Facility: CLINIC | Age: 33
End: 2023-08-10
Payer: COMMERCIAL

## 2023-08-10 ENCOUNTER — HOSPITAL ENCOUNTER (OUTPATIENT)
Dept: CARDIOLOGY | Facility: CLINIC | Age: 33
Discharge: HOME | End: 2023-08-10
Payer: COMMERCIAL

## 2023-08-10 ENCOUNTER — RESEARCH ENCOUNTER (OUTPATIENT)
Dept: RESEARCH | Facility: HOSPITAL | Age: 33
End: 2023-08-10
Payer: COMMERCIAL

## 2023-08-10 ENCOUNTER — LAB VISIT (OUTPATIENT)
Dept: LAB | Facility: HOSPITAL | Age: 33
End: 2023-08-10
Attending: INTERNAL MEDICINE
Payer: COMMERCIAL

## 2023-08-10 VITALS
HEART RATE: 79 BPM | HEIGHT: 60 IN | WEIGHT: 161.19 LBS | OXYGEN SATURATION: 98 % | DIASTOLIC BLOOD PRESSURE: 67 MMHG | SYSTOLIC BLOOD PRESSURE: 118 MMHG | BODY MASS INDEX: 31.64 KG/M2

## 2023-08-10 VITALS
SYSTOLIC BLOOD PRESSURE: 111 MMHG | DIASTOLIC BLOOD PRESSURE: 67 MMHG | RESPIRATION RATE: 22 BRPM | WEIGHT: 159.63 LBS | HEIGHT: 60 IN | BODY MASS INDEX: 31.34 KG/M2 | TEMPERATURE: 98 F | OXYGEN SATURATION: 98 % | HEART RATE: 79 BPM

## 2023-08-10 DIAGNOSIS — I27.21 WHO GROUP 1 PULMONARY ARTERIAL HYPERTENSION: ICD-10-CM

## 2023-08-10 DIAGNOSIS — Z00.6 EXAMINATION OF PARTICIPANT IN CLINICAL TRIAL: ICD-10-CM

## 2023-08-10 DIAGNOSIS — I27.21 WHO GROUP 1 PULMONARY ARTERIAL HYPERTENSION: Primary | ICD-10-CM

## 2023-08-10 LAB — RESEARCH LAB: NORMAL

## 2023-08-10 PROCEDURE — 99999 PR PBB SHADOW E&M-EST. PATIENT-LVL III: ICD-10-PCS | Mod: PBBFAC,,, | Performed by: INTERNAL MEDICINE

## 2023-08-10 PROCEDURE — 93010 ELECTROCARDIOGRAM REPORT: CPT | Mod: S$GLB,,, | Performed by: INTERNAL MEDICINE

## 2023-08-10 PROCEDURE — 36415 COLL VENOUS BLD VENIPUNCTURE: CPT | Performed by: INTERNAL MEDICINE

## 2023-08-10 PROCEDURE — 93005 ELECTROCARDIOGRAM TRACING: CPT | Mod: S$GLB,,, | Performed by: INTERNAL MEDICINE

## 2023-08-10 PROCEDURE — 99215 PR OFFICE/OUTPT VISIT, EST, LEVL V, 40-54 MIN: ICD-10-PCS | Mod: S$GLB,,, | Performed by: INTERNAL MEDICINE

## 2023-08-10 PROCEDURE — 99215 OFFICE O/P EST HI 40 MIN: CPT | Mod: S$GLB,,, | Performed by: INTERNAL MEDICINE

## 2023-08-10 PROCEDURE — 93005 EKG 12-LEAD: ICD-10-PCS | Mod: S$GLB,,, | Performed by: INTERNAL MEDICINE

## 2023-08-10 PROCEDURE — 93010 EKG 12-LEAD: ICD-10-PCS | Mod: S$GLB,,, | Performed by: INTERNAL MEDICINE

## 2023-08-10 PROCEDURE — 99999 PR PBB SHADOW E&M-EST. PATIENT-LVL III: CPT | Mod: PBBFAC,,, | Performed by: INTERNAL MEDICINE

## 2023-08-10 RX ORDER — ACETAMINOPHEN 500 MG
2 TABLET ORAL EVERY 6 HOURS PRN
COMMUNITY

## 2023-08-10 NOTE — PROGRESS NOTES
Study: CS1-003  Sponsor: [employer]  Visit: Screening (V1)  Date of Visit: 8/10/2023     Patient wishes to continue in study: Yes  All study protocol required CRFs completed: Yes     Mrs. Sarmiento is here for the CS1-003 Screening Visit. Screening procedures initiated per protocol following obtaining consent. EKG performed and fasting send out labs (hematology, chemistry, HIV, HBsAG, HCV, eGFR, BNP, NT-proBNP, and urinalysis) collected. Urine pregnancy test performed with negative result. Processing and shipping performed per protocol by [facility] staff (RM Carpenter). INR and aPTT not collected/not applicable per sponsor (no anticoagulation/bleeding history noted). Echocardiogram performed within last year (6/28/2023), therefore, repeat not required for screening. Vitals signs (supine and sitting) including height, weight, and chest circumference (38.5 inches) performed. 6 minute walk test with vital signs and Ayala Scale assessment before, immediately following walk, and 3 minutes after walk performed per protocol. Demographics, lifestyle, medical and surgical history, and concomitant medications reviewed and documented accordingly. Physical exam and WHO FC assessment performed per Dr. Rolon. Screening visit entered into [employer] IRT. Preliminary review of inclusion/exclusion performed by CRC and Dr. Rolon. Awaiting testing results for study eligibility confirmation and RRS 2.0 completion. Next visit (CardioMEMS Implant V2) scheduled for 8/18/2023 starting at 7:30 AM. Patient in agreement with plan and understanding verbalized. CRC contact information given to patient. Instructed patient to call with any questions or concerns. Understanding verbalized. Screening visit payment dispensed via [employer]. Will follow.

## 2023-08-10 NOTE — PROGRESS NOTES
Subjective:       HPI:  Ms. Sarmiento is a very pleasant 33 y.o. year old white female with WHO group 1 PH on a stable dose of SQ Remodulin (40 ng/kg/min) and Opsumit 10 mg daily who comes for a follow-up and screening for the Ce[facility]o study. She reports WHO FC II symptoms.  She is here today as part of her screening for The Ce[facility]o study (CS1-003). She reports no issues with her PH regimen.     Past Medical History:   Diagnosis Date    Migraine headache     Ongoing    Morbidly obese     Unknown start date; lost over 80 pounds between [id number]    Neuropathy 2019    Bilateral feet; ongoing    Ovarian cyst 2017    Bilaterally; ongoing    Pulmonary hypertension 2022    Ongoing    Severe tricuspid regurgitation 2022    Resolved 2023 per echo    Thyroid disease     Ongoing (S/P left partial thyroidectomy)    Vaginal delivery 2012     Past Surgical History:   Procedure Laterality Date    LEFT HEART CATHETERIZATION Left 2022    Procedure: Left heart cath;  Surgeon: Kee Magana MD;  Location: [facility] CATH;  Service: Cardiovascular;  Laterality: Left;    RIGHT HEART CATHETERIZATION Right 2022    Procedure: INSERTION, CATHETER, RIGHT HEART;  Surgeon: Kee Magana MD;  Location: [facility] CATH;  Service: Cardiovascular;  Laterality: Right;  + Shunt Run    RIGHT HEART CATHETERIZATION Right 2023    Procedure: INSERTION, CATHETER, RIGHT HEART;  Surgeon: Danny Clark MD;  Location: [facility] CATH LAB;  Service: Cardiology;  Laterality: Right;  with nitric oxide study    THYROIDECTOMY, PARTIAL Left      OB History          1    Para        Term                AB        Living   1         SAB        IAB        Ectopic        Multiple        Live Births                   Review of Systems   Constitutional: Negative. Negative for chills, decreased appetite, diaphoresis, fever, malaise/fatigue, night sweats, weight gain and weight loss.   Eyes: Negative.    Cardiovascular:   Positive for dyspnea on exertion. Negative for chest pain, claudication, cyanosis, irregular heartbeat, leg swelling, near-syncope, orthopnea, palpitations, paroxysmal nocturnal dyspnea and syncope.   Respiratory:  Negative for cough, hemoptysis and shortness of breath.    Endocrine: Negative.    Hematologic/Lymphatic: Negative.    Skin:  Negative for color change, dry skin and nail changes.   Musculoskeletal: Negative.    Gastrointestinal: Negative.    Genitourinary: Negative.    Neurological:  Negative for weakness.       Objective:   Blood pressure 118/67, pulse 79, height 5' (1.524 m), weight 73.1 kg (161 lb 2.5 oz), SpO2 98 %.body mass index is 31.47 kg/m².  Physical Exam  Vitals and nursing note reviewed.   Constitutional:       Appearance: She is well-developed.   HENT:      Head: Normocephalic.   Eyes:      Pupils: Pupils are equal, round, and reactive to light.   Neck:      Vascular: No JVD.   Cardiovascular:      Rate and Rhythm: Normal rate and regular rhythm.      Chest Wall: PMI is not displaced.      Pulses: Intact distal pulses.      Heart sounds: Normal heart sounds. No murmur heard.     No friction rub. No gallop.      Comments: Loud S2  Pulmonary:      Effort: Pulmonary effort is normal.      Breath sounds: Normal breath sounds. No wheezing or rales.   Abdominal:      General: Bowel sounds are normal.      Palpations: Abdomen is soft.   Musculoskeletal:      Cervical back: Neck supple.   Neurological:      Mental Status: She is alert and oriented to person, place, and time.         Labs:    Chemistry        Component Value Date/Time     06/28/2023 0742    K 3.8 06/28/2023 0742     06/28/2023 0742    CO2 26 06/28/2023 0742    BUN 15 06/28/2023 0742    CREATININE 0.8 06/28/2023 0742    GLU 76 06/28/2023 0742        Component Value Date/Time    CALCIUM 9.4 06/28/2023 0742    ALKPHOS 108 06/28/2023 0742    AST 39 06/28/2023 0742    ALT 56 (H) 06/28/2023 0742    BILITOT 0.4 06/28/2023 0742  "         Magnesium   Date Value Ref Range Status   06/28/2023 2.1 1.6 - 2.6 mg/dL Final     Lab Results   Component Value Date    WBC 6.38 06/28/2023    HGB 12.2 06/28/2023    HCT 39.4 06/28/2023     06/28/2023     No results found for: "INR", "PROTIME"  BNP   Date Value Ref Range Status   06/28/2023 58 0 - 99 pg/mL Final     Comment:     Values of less than 100 pg/ml are consistent with non-CHF populations.   01/10/2023 235 (H) 0 - 99 pg/mL Final     Comment:     Values of less than 100 pg/ml are consistent with non-CHF populations.       Assessment:      1. WHO group 1 pulmonary arterial hypertension        Plan:   WHO group 1 PH with WHO FC II symptoms on a  stable PH regimen (Remodulin 40ng/kg/min since 5/12/2023) and Opsumit 10 mg daily since 2/13/2023.   Patient was screened for the CER[facility] study.  CardioMEMS Implant visit scheduled for August 18, 2023.    Recommend 2 gram sodium restriction and 1500cc fluid restriction.  Encourage physical activity with graded exercise program.  Requested patient to weigh themselves daily, and to notify us if their weight increases by more than 3 lbs in 1 day or 5 lbs in 1 week.      Maged Rolon MD         "

## 2023-08-10 NOTE — PROGRESS NOTES
Date Consent signed: 8/10/2023    Sponsor: [employer]    Study Title/IRB Number: [id number]    Principle Investigator: Maged Rolon MD    Present for Discussion: Patient, patient's , and CRC     Is LAR Consenting for Subject: No    Prior to the Informed Consent (IC) being signed, or any study protocol required data collection, testing, procedure, or intervention being performed, the following was done and/or discussed:  Patient was given a copy of the IC for review   Purpose of the study and qualifications to participate   Study design, Follow up schedule, and tests or procedures done at each visit  Confidentiality and HIPAA Authorization for Release of Medical Records for the research trial/ subject's rights/research related injury  Risk, Benefits, Alternative Treatments, Compensation and Costs  Participation in the research trial is voluntary and patient may withdraw at anytime  Contact information for study related questions    Patient verbalizes understanding of the above: Yes  Contact information for CRC and PI given to patient: Yes  Patient able to adequately summarize: the purpose of the study, the risks associated with the study, and all procedures, testing, and follow-ups associated with the study: Yes    Mrs. Sarmiento signed the informed consent forms for the CS1-003 research study and optional biomarker study, both with an IRB approval date of 4/26/2023. Each page of the consent forms were reviewed with patient and patient's  and all questions answered satisfactorily. Mrs. Sarmiento signed the consent forms and received a copy of both. The original consents were scanned into electronic medical records (EPIC) and filed into the subject's research study binder.

## 2023-08-14 ENCOUNTER — LAB VISIT (OUTPATIENT)
Dept: LAB | Facility: HOSPITAL | Age: 33
End: 2023-08-14
Payer: COMMERCIAL

## 2023-08-14 DIAGNOSIS — Z32.00 ENCOUNTER FOR PREGNANCY TEST, RESULT UNKNOWN: ICD-10-CM

## 2023-08-14 LAB — B-HCG UR QL: NEGATIVE

## 2023-08-14 PROCEDURE — 81025 URINE PREGNANCY TEST: CPT

## 2023-08-17 ENCOUNTER — RESEARCH ENCOUNTER (OUTPATIENT)
Dept: RESEARCH | Facility: HOSPITAL | Age: 33
End: 2023-08-17
Payer: COMMERCIAL

## 2023-08-17 DIAGNOSIS — I27.21 WHO GROUP 1 PULMONARY ARTERIAL HYPERTENSION: Primary | ICD-10-CM

## 2023-08-17 PROCEDURE — 81025 POCT URINE PREGNANCY: ICD-10-PCS | Mod: S$GLB,,, | Performed by: INTERNAL MEDICINE

## 2023-08-17 PROCEDURE — 81025 URINE PREGNANCY TEST: CPT | Mod: S$GLB,,, | Performed by: INTERNAL MEDICINE

## 2023-08-17 NOTE — PROGRESS NOTES
Contacted patient today 8/17/2023 regarding scheduled Cereno CS1-003 Visit 2 and CardioMEMS implant on Friday 8/18/2023 starting at 7:30 AM. EKG scheduled at 7:30 AM and lab appointment scheduled at 7:50 AM. Patient informed that she will check in to [facility]U following lab appointment and prior to procedure at 8:15 AM and RHC/implant procedure is scheduled for 10:00 AM. Complete understanding verbalized and patient in agreement with plan. Patient instructions given per Dr. Rolon's orders. Patient instructed to fast beginning at midnight the night before the procedure and to take all medications on the morning of the procedure per her normal routine with small sips of water only. Confirmed patient is not taking any medications for diabetes or anticoagulation. All procedure and medication instructions repeated back correctly by patient. Physical exam with WHO FC assessment, vital signs, and review of concomitant medications and adverse events to be completed in SSCU before procedure. Actigraphy device and PAH-SYMPACT questionnaires for V3 to be distributed. Education regarding CardioMEMS PA sensor measurement process and actigraphy device use to be performed. Instructed patient to contact [facility] if any questions arise. Complete understanding of all above instructions verbalized and patient in agreement with plan. Patient denies any questions or concerns at this time. Will follow.

## 2023-08-18 ENCOUNTER — RESEARCH ENCOUNTER (OUTPATIENT)
Dept: RESEARCH | Facility: HOSPITAL | Age: 33
End: 2023-08-18
Payer: COMMERCIAL

## 2023-08-18 ENCOUNTER — PATIENT MESSAGE (OUTPATIENT)
Dept: MEDSURG UNIT | Facility: HOSPITAL | Age: 33
End: 2023-08-18
Payer: COMMERCIAL

## 2023-08-18 ENCOUNTER — HOSPITAL ENCOUNTER (OUTPATIENT)
Facility: HOSPITAL | Age: 33
Discharge: HOME | End: 2023-08-18
Attending: INTERNAL MEDICINE | Admitting: INTERNAL MEDICINE
Payer: COMMERCIAL

## 2023-08-18 VITALS
DIASTOLIC BLOOD PRESSURE: 59 MMHG | SYSTOLIC BLOOD PRESSURE: 103 MMHG | HEART RATE: 74 BPM | HEIGHT: 60 IN | WEIGHT: 158.75 LBS | BODY MASS INDEX: 31.17 KG/M2 | OXYGEN SATURATION: 98 % | TEMPERATURE: 98 F | RESPIRATION RATE: 18 BRPM

## 2023-08-18 VITALS
BODY MASS INDEX: 31 KG/M2 | DIASTOLIC BLOOD PRESSURE: 65 MMHG | TEMPERATURE: 98 F | RESPIRATION RATE: 22 BRPM | OXYGEN SATURATION: 100 % | SYSTOLIC BLOOD PRESSURE: 103 MMHG | HEART RATE: 88 BPM | WEIGHT: 158.75 LBS

## 2023-08-18 DIAGNOSIS — I27.20 PULMONARY HTN: ICD-10-CM

## 2023-08-18 DIAGNOSIS — Z00.6 EXAMINATION OF PARTICIPANT IN CLINICAL TRIAL: ICD-10-CM

## 2023-08-18 DIAGNOSIS — I27.21 WHO GROUP 1 PULMONARY ARTERIAL HYPERTENSION: Primary | ICD-10-CM

## 2023-08-18 LAB
ALBUMIN SERPL BCP-MCNC: 3.8 G/DL (ref 3.5–5.2)
ALP SERPL-CCNC: 121 U/L (ref 55–135)
ALT SERPL W/O P-5'-P-CCNC: 40 U/L (ref 10–44)
ANION GAP SERPL CALC-SCNC: 12 MMOL/L (ref 8–16)
AST SERPL-CCNC: 25 U/L (ref 10–40)
B-HCG UR QL: NEGATIVE
BASOPHILS # BLD AUTO: 0.04 K/UL (ref 0–0.2)
BASOPHILS NFR BLD: 0.6 % (ref 0–1.9)
BILIRUB SERPL-MCNC: 0.6 MG/DL (ref 0.1–1)
BUN SERPL-MCNC: 12 MG/DL (ref 6–20)
CALCIUM SERPL-MCNC: 9 MG/DL (ref 8.7–10.5)
CHLORIDE SERPL-SCNC: 102 MMOL/L (ref 95–110)
CO2 SERPL-SCNC: 24 MMOL/L (ref 23–29)
CREAT SERPL-MCNC: 0.8 MG/DL (ref 0.5–1.4)
CTP QC/QA: YES
DIFFERENTIAL METHOD: ABNORMAL
EOSINOPHIL # BLD AUTO: 0.3 K/UL (ref 0–0.5)
EOSINOPHIL NFR BLD: 4 % (ref 0–8)
ERYTHROCYTE [DISTWIDTH] IN BLOOD BY AUTOMATED COUNT: 14.3 % (ref 11.5–14.5)
EST. GFR  (NO RACE VARIABLE): >60 ML/MIN/1.73 M^2
GLUCOSE SERPL-MCNC: 84 MG/DL (ref 70–110)
HCT VFR BLD AUTO: 35.5 % (ref 37–48.5)
HGB BLD-MCNC: 11 G/DL (ref 12–16)
IMM GRANULOCYTES # BLD AUTO: 0.01 K/UL (ref 0–0.04)
IMM GRANULOCYTES NFR BLD AUTO: 0.2 % (ref 0–0.5)
INR PPP: 0.9 (ref 0.8–1.2)
LYMPHOCYTES # BLD AUTO: 1.1 K/UL (ref 1–4.8)
LYMPHOCYTES NFR BLD: 17.4 % (ref 18–48)
MCH RBC QN AUTO: 22.7 PG (ref 27–31)
MCHC RBC AUTO-ENTMCNC: 31 G/DL (ref 32–36)
MCV RBC AUTO: 73 FL (ref 82–98)
MONOCYTES # BLD AUTO: 0.4 K/UL (ref 0.3–1)
MONOCYTES NFR BLD: 7.1 % (ref 4–15)
NEUTROPHILS # BLD AUTO: 4.4 K/UL (ref 1.8–7.7)
NEUTROPHILS NFR BLD: 70.7 % (ref 38–73)
NRBC BLD-RTO: 0 /100 WBC
PLATELET # BLD AUTO: 361 K/UL (ref 150–450)
PMV BLD AUTO: 11.7 FL (ref 9.2–12.9)
POTASSIUM SERPL-SCNC: 3.3 MMOL/L (ref 3.5–5.1)
PROT SERPL-MCNC: 8.1 G/DL (ref 6–8.4)
PROTHROMBIN TIME: 9.8 SEC (ref 9–12.5)
RBC # BLD AUTO: 4.85 M/UL (ref 4–5.4)
SODIUM SERPL-SCNC: 138 MMOL/L (ref 136–145)
WBC # BLD AUTO: 6.22 K/UL (ref 3.9–12.7)

## 2023-08-18 PROCEDURE — 63600175 PHARM REV CODE 636 W HCPCS: Performed by: INTERNAL MEDICINE

## 2023-08-18 PROCEDURE — 85025 COMPLETE CBC W/AUTO DIFF WBC: CPT | Performed by: INTERNAL MEDICINE

## 2023-08-18 PROCEDURE — 80053 COMPREHEN METABOLIC PANEL: CPT | Performed by: INTERNAL MEDICINE

## 2023-08-18 PROCEDURE — 33289 TCAT IMPL WRLS P-ART PRS SNR: CPT | Performed by: INTERNAL MEDICINE

## 2023-08-18 PROCEDURE — 99152 PR MOD CONSCIOUS SEDATION, SAME PHYS, 5+ YRS, FIRST 15 MIN: ICD-10-PCS | Mod: ,,, | Performed by: INTERNAL MEDICINE

## 2023-08-18 PROCEDURE — 33289 TCAT IMPL WRLS P-ART PRS SNR: CPT | Mod: ,,, | Performed by: INTERNAL MEDICINE

## 2023-08-18 PROCEDURE — C1894 INTRO/SHEATH, NON-LASER: HCPCS | Performed by: INTERNAL MEDICINE

## 2023-08-18 PROCEDURE — 99153 MOD SED SAME PHYS/QHP EA: CPT | Performed by: INTERNAL MEDICINE

## 2023-08-18 PROCEDURE — 93010 EKG 12-LEAD: ICD-10-PCS | Mod: ,,, | Performed by: INTERNAL MEDICINE

## 2023-08-18 PROCEDURE — 85610 PROTHROMBIN TIME: CPT | Performed by: INTERNAL MEDICINE

## 2023-08-18 PROCEDURE — 93010 ELECTROCARDIOGRAM REPORT: CPT | Mod: ,,, | Performed by: INTERNAL MEDICINE

## 2023-08-18 PROCEDURE — 33289 PR TRANSCATH IMPLANT, WIRELESS PULM-ART PRESSURE SENSR: ICD-10-PCS | Mod: ,,, | Performed by: INTERNAL MEDICINE

## 2023-08-18 PROCEDURE — 93005 ELECTROCARDIOGRAM TRACING: CPT

## 2023-08-18 PROCEDURE — 99152 MOD SED SAME PHYS/QHP 5/>YRS: CPT | Mod: ,,, | Performed by: INTERNAL MEDICINE

## 2023-08-18 PROCEDURE — 99152 MOD SED SAME PHYS/QHP 5/>YRS: CPT | Performed by: INTERNAL MEDICINE

## 2023-08-18 PROCEDURE — 25000003 PHARM REV CODE 250: Performed by: INTERNAL MEDICINE

## 2023-08-18 PROCEDURE — 27201423 OPTIME MED/SURG SUP & DEVICES STERILE SUPPLY: Performed by: INTERNAL MEDICINE

## 2023-08-18 PROCEDURE — 25500020 PHARM REV CODE 255: Performed by: INTERNAL MEDICINE

## 2023-08-18 PROCEDURE — C1769 GUIDE WIRE: HCPCS | Performed by: INTERNAL MEDICINE

## 2023-08-18 RX ORDER — ASPIRIN 81 MG/1
81 TABLET ORAL DAILY
Qty: 30 TABLET | Refills: 11 | Status: SHIPPED | OUTPATIENT
Start: 2023-08-18 | End: 2024-08-17

## 2023-08-18 RX ORDER — LIDOCAINE HYDROCHLORIDE 20 MG/ML
INJECTION, SOLUTION INFILTRATION; PERINEURAL
Status: DISCONTINUED | OUTPATIENT
Start: 2023-08-18 | End: 2023-08-18 | Stop reason: HOSPADM

## 2023-08-18 RX ORDER — MIDAZOLAM HYDROCHLORIDE 1 MG/ML
INJECTION, SOLUTION INTRAMUSCULAR; INTRAVENOUS
Status: DISCONTINUED | OUTPATIENT
Start: 2023-08-18 | End: 2023-08-18 | Stop reason: HOSPADM

## 2023-08-18 RX ORDER — DIPHENHYDRAMINE HCL 25 MG
25 CAPSULE ORAL ONCE
Status: COMPLETED | OUTPATIENT
Start: 2023-08-18 | End: 2023-08-18

## 2023-08-18 RX ORDER — HEPARIN SOD,PORCINE/0.9 % NACL 1000/500ML
INTRAVENOUS SOLUTION INTRAVENOUS
Status: DISCONTINUED | OUTPATIENT
Start: 2023-08-18 | End: 2023-08-18 | Stop reason: HOSPADM

## 2023-08-18 RX ORDER — FENTANYL CITRATE 50 UG/ML
INJECTION, SOLUTION INTRAMUSCULAR; INTRAVENOUS
Status: DISCONTINUED | OUTPATIENT
Start: 2023-08-18 | End: 2023-08-18 | Stop reason: HOSPADM

## 2023-08-18 RX ORDER — CLOPIDOGREL BISULFATE 75 MG/1
TABLET ORAL
Qty: 30 TABLET | Refills: 0 | Status: SHIPPED | OUTPATIENT
Start: 2023-08-18 | End: 2023-09-19

## 2023-08-18 RX ADMIN — DIPHENHYDRAMINE HYDROCHLORIDE 25 MG: 25 CAPSULE ORAL at 09:08

## 2023-08-18 NOTE — Clinical Note
8 ml of contrast were injected throughout the case. 42 mL of contrast was the total wasted during the case. 50 mL was the total amount used during the case.

## 2023-08-18 NOTE — Clinical Note
The PA catheter was repositioned to the main pulmonary artery. Hemodynamics were performed. O2 saturation was measured at 70%. CO: 5.6  CI: 3.29An angiography was performed.

## 2023-08-18 NOTE — PROGRESS NOTES
Study: CS1-003  Sponsor: [employer]  Visit: Baseline (V2)  Date of Visit: 8/18/2023     Patient wishes to continue in study: Yes  All study protocol required CRFs completed: Yes     Mrs. Sarmiento is here for the CS1-003 Baseline Visit. EKG performed and fasting send out labs (hematology, chemistry, and urinalysis) collected. Urine pregnancy test negative. Processing and shipping performed per protocol by [facility] staff (Estelita Carrasco, RM). INR and aPTT not applicable per sponsor (no anticoagulation/bleeding history noted), however, INR obtained per pre-procedure standard of care and recorded accordingly. Physical exam and WHO FC assessment performed per Dr. Rolon. Vitals signs (supine and sitting) including weight performed. RHC performed with [place]ior measurements obtained during procedure prior to device implant. Review of inclusion/exclusion criteria performed by RM and Dr. Rolon, and study eligibility remains confirmed at this time. CardioMEMS PA Sensor successfully implanted by Dr. Rolon. Calibration via [facility] completed per protocol requirements following implant by MINH Delgadillo with [employer]. Device accountability updated. Patient education/training regarding the Patient Electronics Unit process and transmission performance expectations completed before and following implant by MINH Delgadillo with [facility]. Initial patient transmission from Patient Electronics Unit successfully transmitted to [employer] [facility] site. Patient then successfully transferred into the [employer] Cereno Merlin site. Actigraphy device assigned and provided to the patient. Operation of device and set up explained, and [employer] Activity Monitoring System Participant Guide reviewed and given to patient for reference. PAH-SYMPACT Day 1-6 questionnaires distributed to patient and instructions for completion prior to next visit reviewed. No adverse events to report since screening visit, and implant and  post-implant course were uncomplicated. Patient prescribed Aspirin indefinitely and Plavix for 30 days post-implant, both to begin tomorrow 8/19/2023 per Dr. Rolon's orders. Importance of medication compliance post-implant reinforced with patient. No medication changes reported since screening visit, and no further medication changes noted at discharge. Patient and patient's  verbalized complete understanding of all study instructions and education provided. Next visit (Randomization V3) to be scheduled the week of 9/18/2023. Patient and patient's  in agreement with plan and deny any questions or concerns. Patient comfortable with discharge at this time. Baseline V2 visit payment dispensed via [employer]. Will follow.

## 2023-08-18 NOTE — H&P
Subjective:     Ms. Sarmiento is a very pleasant 33 y.o. year old white female with WHO group 1 PH on a stable dose of SQ Remodulin (40 ng/kg/min) and Opsumit 10 mg daily. She reports WHO FC II symptoms. She is here for a CardiomEMS implant as part of her screening for the Cereno study (CS1-003)    Past Medical History:   Diagnosis Date    Migraine headache 2000    Ongoing    Morbidly obese     Unknown start date; lost over 80 pounds between 3851-0063    Neuropathy 2019    Bilateral feet; ongoing    Ovarian cyst 2017    Bilaterally; ongoing    Pulmonary hypertension 12/01/2022    Ongoing    Severe tricuspid regurgitation 11/09/2022    Resolved 6/28/2023 per echo    Thyroid disease 2013    Ongoing (S/P left partial thyroidectomy)    Vaginal delivery 12/11/2012     Past Surgical History:   Procedure Laterality Date    LEFT HEART CATHETERIZATION Left 12/01/2022    Procedure: Left heart cath;  Surgeon: Kee Magana MD;  [facility];  Service: Cardiovascular;  Laterality: Left;    RIGHT HEART CATHETERIZATION Right 12/01/2022    Procedure: INSERTION, CATHETER, RIGHT HEART;  Surgeon: Kee Magana MD;  [facility];  Service: Cardiovascular;  Laterality: Right;  + Shunt Run    RIGHT HEART CATHETERIZATION Right 02/01/2023    Procedure: INSERTION, CATHETER, RIGHT HEART;  Surgeon: Danny Clark MD;  Location: [facility] CATH LAB;  Service: Cardiology;  Laterality: Right;  with nitric oxide study    THYROIDECTOMY, PARTIAL Left 2013         Other significant clinical information:  Review of patient's allergies indicates:   Allergen Reactions    Amoxicillin     Penicillins      Current Facility-Administered Medications   Medication Dose Route Frequency Provider Last Rate Last Admin    diphenhydrAMINE capsule 25 mg  25 mg Oral Once Maged Rolon MD           Review of Systems  Constitutional: Negative. Negative for chills, decreased appetite, diaphoresis, fever, malaise/fatigue, night sweats, weight gain and  weight loss.   Eyes: Negative.    Cardiovascular:  Positive for dyspnea on exertion. Negative for chest pain, claudication, cyanosis, irregular heartbeat, leg swelling, near-syncope, orthopnea, palpitations, paroxysmal nocturnal dyspnea and syncope.   Respiratory:  Negative for cough, hemoptysis and shortness of breath.    Endocrine: Negative.    Hematologic/Lymphatic: Negative.    Skin:  Negative for color change, dry skin and nail changes.   Musculoskeletal: Negative.    Gastrointestinal: Negative.    Genitourinary: Negative.    Neurological:  Negative for weakness.      Objective:   Physical Exam:  Constitutional:       Appearance: She is well-developed.   HENT:      Head: Normocephalic.   Eyes:      Pupils: Pupils are equal, round, and reactive to light.   Neck:      Vascular: No JVD.   Cardiovascular:      Rate and Rhythm: Normal rate and regular rhythm.      Chest Wall: PMI is not displaced.      Pulses: Intact distal pulses.      Heart sounds: Normal heart sounds. No murmur heard.     No friction rub. No gallop.      Comments: Loud S2  Pulmonary:      Effort: Pulmonary effort is normal.      Breath sounds: Normal breath sounds. No wheezing or rales.   Abdominal:      General: Bowel sounds are normal.      Palpations: Abdomen is soft.   Musculoskeletal:      Cervical back: Neck supple.   Neurological:      Mental Status: She is alert and oriented to person, place, and time.   Assessment:   WHO group 1 PH with WHO FC II symptoms on a  stable PH regimen (see above).   Patient was screened for the CERENO study and is here today for her CardioMEMS implant.   Right or left CFV access  11 Fr venous sheath  Micropuncture kit  Risks and benefits of the procedure were discussed. All consents were signed and in the chart     Maged Rolon MD

## 2023-08-18 NOTE — DISCHARGE SUMMARY
[facility] - Short Stay Cardiac [facility]  Discharge Note  Short Stay    Procedure(s) (LRB):  Insertion, Wireless Sensor, For Pulmonary Arterial Pressure Monitoring (Right)  INSERTION, CATHETER, RIGHT HEART (Right)  Pumonary angiography - unilateral      OUTCOME: Patient tolerated treatment/procedure well without complication and is now ready for discharge.       Medication List        START taking these medications      aspirin 81 MG EC tablet  Commonly known as: ECOTRIN  Take 1 tablet (81 mg total) by mouth once daily.     clopidogreL 75 mg tablet  Commonly known as: PLAVIX  Take 1 tab daily for 30 days only.            ASK your doctor about these medications      acetaminophen 500 MG tablet  Commonly known as: TYLENOL     aspirin-acetaminophen-caffeine 250-250-65 mg 250-250-65 mg per tablet  Commonly known as: EXCEDRIN MIGRAINE     furosemide 40 MG tablet  Commonly known as: LASIX  Take 1 tablet (40 mg total) by mouth once daily.     levothyroxine 175 MCG tablet  Commonly known as: SYNTHROID, LEVOTHROID     macitentan 10 mg Tab     norgestimate-ethinyl estradioL 0.18/0.215/0.25 mg-35 mcg (28) tablet  Commonly known as: ORTHO TRI-CYCLEN,TRI-SPRINTEC  Take 1 tablet by mouth once daily.     pregabalin 50 MG capsule  Commonly known as: LYRICA  Take 2 capsules (100 mg total) by mouth every evening.     spironolactone 25 MG tablet  Commonly known as: ALDACTONE  Take 1 tablet (25 mg total) by mouth once daily.     traMADoL 50 mg tablet  Commonly known as: ULTRAM  Take 1 tablet (50 mg total) by mouth every 6 (six) hours as needed for Pain.     treprostinil 2.5 mg/mL Soln  Commonly known as: REMODULIN               Where to Get Your Medications        These medications were sent to [facility]/pharmacy #[id number] - [place], [place] - [address]  [address], [facility] [address]      Phone: [phone]   aspirin 81 MG EC tablet  clopidogreL 75 mg tablet           DISPOSITION: Home or Self Care    FINAL DIAGNOSIS:  WHO group 1 Pulmonary  HTN    FOLLOWUP: In clinic    DISCHARGE INSTRUCTIONS:      Cardiac diet    Activity as tolerated    Maged Rolon MD

## 2023-08-18 NOTE — PATIENT INSTRUCTIONS

## 2023-08-18 NOTE — BRIEF OP NOTE
[facility] Short Stay Cardiac [facility]  Brief Operative Note  Cardiology    SUMMARY     Surgery Date: 8/18/2023     Surgeon(s) and Role:     * Maged Rolon MD - Primary     * Keon Swenson MD - Fellow      Pre-op Diagnosis:  WHO group 1 pulmonary arterial hypertension [I27.21]  Examination of participant in clinical trial [Z00.6]    Post-op Diagnosis: Post-Op Diagnosis Codes:     * WHO group 1 pulmonary arterial hypertension [I27.21]     * Examination of participant in clinical trial [Z00.6]      Procedure(s) (LRB):  Insertion, Wireless Sensor, For Pulmonary Arterial Pressure Monitoring (Right)  INSERTION, CATHETER, RIGHT HEART (Right)  Pumonary angiography - unilateral    Procedure Performed: RHC, Left PA angiogram and CardioMEMS implant.      Description of the findings of the procedure: RHC and CardioMEMS implant performed via performed via the right CFV. Patient tolerated the procedure well. Normal left and right side filling pressures (RA= 9 mm of Hg, PCWP=12 mm of Hg). Severe pulmonary HTN  (PA=89/28 mm of Hg, PA mean=51 mm of Hg, TPG=39, PVR=7). Normal cardiac index and output  (CI=3.22 L/min/m2, CO=5.6 L/min). Selective left pulmonary artery angiogram was performed and vessel seize was assessed for device placement. CardioMEMs device was successfully deployed. Calibration of the device was performed int  Cathlab.      Estimated Blood Loss: < 10 cc     Plan:   - Bed rest for 3 hours  - Routine post-cath care  - Cardiac diet.  - DAPT for 30 days then aspirin for Life.   - Patient is enrolled in the Cereno trial     Maged Rolon MD

## 2023-08-18 NOTE — PLAN OF CARE
Received report from CLAUDY Ann. Patient s/p cardiomems, AAOx3. VSS, no c/o pain or discomfort at this time, resp even and unlabored. Gauze/tegaderm dressing to R groin is CDI. No active bleeding. No hematoma noted. Post procedure protocol reviewed with patient and patient's family. Understanding verbalized. Family members at bedside. Nurse call bell within reach. Will continue to monitor per post procedure protocol.

## 2023-08-21 DIAGNOSIS — I27.21 WHO GROUP 1 PULMONARY ARTERIAL HYPERTENSION: Primary | ICD-10-CM

## 2023-08-21 DIAGNOSIS — Z00.6 EXAMINATION OF PARTICIPANT IN CLINICAL TRIAL: ICD-10-CM

## 2023-09-11 ENCOUNTER — LAB VISIT (OUTPATIENT)
Dept: LAB | Facility: HOSPITAL | Age: 33
End: 2023-09-11
Payer: COMMERCIAL

## 2023-09-11 DIAGNOSIS — Z32.00 ENCOUNTER FOR PREGNANCY TEST, RESULT UNKNOWN: ICD-10-CM

## 2023-09-11 LAB — B-HCG UR QL: NEGATIVE

## 2023-09-11 PROCEDURE — 81025 URINE PREGNANCY TEST: CPT

## 2023-09-13 ENCOUNTER — PATIENT MESSAGE (OUTPATIENT)
Dept: RESEARCH | Facility: HOSPITAL | Age: 33
End: 2023-09-13
Payer: COMMERCIAL

## 2023-09-19 ENCOUNTER — HOSPITAL ENCOUNTER (OUTPATIENT)
Dept: CARDIOLOGY | Facility: HOSPITAL | Age: 33
Discharge: HOME | End: 2023-09-19
Attending: INTERNAL MEDICINE
Payer: COMMERCIAL

## 2023-09-19 ENCOUNTER — RESEARCH ENCOUNTER (OUTPATIENT)
Dept: RESEARCH | Facility: HOSPITAL | Age: 33
End: 2023-09-19
Payer: COMMERCIAL

## 2023-09-19 ENCOUNTER — HOSPITAL ENCOUNTER (OUTPATIENT)
Dept: CARDIOLOGY | Facility: CLINIC | Age: 33
Discharge: HOME | End: 2023-09-19
Payer: COMMERCIAL

## 2023-09-19 ENCOUNTER — OFFICE VISIT (OUTPATIENT)
Dept: TRANSPLANT | Facility: CLINIC | Age: 33
End: 2023-09-19
Payer: COMMERCIAL

## 2023-09-19 ENCOUNTER — HOSPITAL ENCOUNTER (OUTPATIENT)
Dept: RADIOLOGY | Facility: HOSPITAL | Age: 33
Discharge: HOME | End: 2023-09-19
Attending: INTERNAL MEDICINE
Payer: COMMERCIAL

## 2023-09-19 VITALS
RESPIRATION RATE: 18 BRPM | BODY MASS INDEX: 31.6 KG/M2 | WEIGHT: 161.81 LBS | HEART RATE: 79 BPM | SYSTOLIC BLOOD PRESSURE: 112 MMHG | DIASTOLIC BLOOD PRESSURE: 59 MMHG | TEMPERATURE: 98 F | OXYGEN SATURATION: 97 %

## 2023-09-19 VITALS
BODY MASS INDEX: 31.94 KG/M2 | SYSTOLIC BLOOD PRESSURE: 116 MMHG | OXYGEN SATURATION: 99 % | WEIGHT: 162.69 LBS | HEART RATE: 76 BPM | DIASTOLIC BLOOD PRESSURE: 68 MMHG | HEIGHT: 60 IN

## 2023-09-19 VITALS — WEIGHT: 158 LBS | BODY MASS INDEX: 31.02 KG/M2 | HEIGHT: 60 IN

## 2023-09-19 DIAGNOSIS — Z00.6 EXAMINATION OF PARTICIPANT IN CLINICAL TRIAL: ICD-10-CM

## 2023-09-19 DIAGNOSIS — I27.21 WHO GROUP 1 PULMONARY ARTERIAL HYPERTENSION: ICD-10-CM

## 2023-09-19 DIAGNOSIS — I27.21 WHO GROUP 1 PULMONARY ARTERIAL HYPERTENSION: Primary | ICD-10-CM

## 2023-09-19 DIAGNOSIS — Z79.899 HIGH RISK MEDICATION USE: ICD-10-CM

## 2023-09-19 DIAGNOSIS — I27.9 CHRONIC PULMONARY HEART DISEASE: ICD-10-CM

## 2023-09-19 LAB
ASCENDING AORTA: 2.91 CM
AV INDEX (PROSTH): 1.01
AV MEAN GRADIENT: 6 MMHG
AV PEAK GRADIENT: 10 MMHG
AV VALVE AREA BY VELOCITY RATIO: 2.6 CM²
AV VALVE AREA: 3.09 CM²
AV VELOCITY RATIO: 0.85
BSA FOR ECHO PROCEDURE: 1.74 M2
CV ECHO LV RWT: 0.31 CM
DOP CALC AO PEAK VEL: 1.58 M/S
DOP CALC AO VTI: 28.66 CM
DOP CALC LVOT AREA: 3 CM2
DOP CALC LVOT DIAMETER: 1.97 CM
DOP CALC LVOT PEAK VEL: 1.35 M/S
DOP CALC LVOT STROKE VOLUME: 88.5 CM3
DOP CALCLVOT PEAK VEL VTI: 29.05 CM
E WAVE DECELERATION TIME: 286.15 MSEC
E/A RATIO: 1.15
E/E' RATIO: 9 M/S
ECHO LV POSTERIOR WALL: 0.76 CM (ref 0.6–1.1)
FRACTIONAL SHORTENING: 40 % (ref 28–44)
INTERVENTRICULAR SEPTUM: 0.78 CM (ref 0.6–1.1)
IVRT: 66.6 MSEC
LA MAJOR: 5.66 CM
LA MINOR: 4.64 CM
LA WIDTH: 3.13 CM
LEFT ATRIUM SIZE: 3.85 CM
LEFT ATRIUM VOLUME INDEX MOD: 23.8 ML/M2
LEFT ATRIUM VOLUME INDEX: 30.9 ML/M2
LEFT ATRIUM VOLUME MOD: 40.29 CM3
LEFT ATRIUM VOLUME: 52.23 CM3
LEFT INTERNAL DIMENSION IN SYSTOLE: 2.95 CM (ref 2.1–4)
LEFT VENTRICLE DIASTOLIC VOLUME INDEX: 67.86 ML/M2
LEFT VENTRICLE DIASTOLIC VOLUME: 114.69 ML
LEFT VENTRICLE MASS INDEX: 75 G/M2
LEFT VENTRICLE SYSTOLIC VOLUME INDEX: 19.8 ML/M2
LEFT VENTRICLE SYSTOLIC VOLUME: 33.48 ML
LEFT VENTRICULAR INTERNAL DIMENSION IN DIASTOLE: 4.93 CM (ref 3.5–6)
LEFT VENTRICULAR MASS: 126.25 G
LV LATERAL E/E' RATIO: 6.92 M/S
LV SEPTAL E/E' RATIO: 12.86 M/S
MV A" WAVE DURATION": 6.47 MSEC
MV PEAK A VEL: 0.78 M/S
MV PEAK E VEL: 0.9 M/S
MV STENOSIS PRESSURE HALF TIME: 82.98 MS
MV VALVE AREA P 1/2 METHOD: 2.65 CM2
PISA TR MAX VEL: 4.1 M/S
PULM VEIN S/D RATIO: 0.99
PV PEAK D VEL: 0.7 M/S
PV PEAK S VEL: 0.69 M/S
RA MAJOR: 5.3 CM
RA PRESSURE ESTIMATED: 3 MMHG
RA WIDTH: 3.71 CM
RIGHT VENTRICULAR END-DIASTOLIC DIMENSION: 5.1 CM
RV TB RVSP: 7 MMHG
SINUS: 2.43 CM
STJ: 1.99 CM
TDI LATERAL: 0.13 M/S
TDI SEPTAL: 0.07 M/S
TDI: 0.1 M/S
TR MAX PG: 67 MMHG
TRICUSPID ANNULAR PLANE SYSTOLIC EXCURSION: 1.92 CM
TV REST PULMONARY ARTERY PRESSURE: 70 MMHG
Z-SCORE OF LEFT VENTRICULAR DIMENSION IN END DIASTOLE: 0.46
Z-SCORE OF LEFT VENTRICULAR DIMENSION IN END SYSTOLE: 0.09

## 2023-09-19 PROCEDURE — 99999 PR PBB SHADOW E&M-EST. PATIENT-LVL III: CPT | Mod: PBBFAC,,, | Performed by: INTERNAL MEDICINE

## 2023-09-19 PROCEDURE — 99499 NO LOS: ICD-10-PCS | Mod: S$GLB,,, | Performed by: INTERNAL MEDICINE

## 2023-09-19 PROCEDURE — 75557 MRI CARDIAC WO CONTRAST: ICD-10-PCS | Mod: 26,,, | Performed by: RADIOLOGY

## 2023-09-19 PROCEDURE — 75557 CARDIAC MRI FOR MORPH: CPT | Mod: TC

## 2023-09-19 PROCEDURE — 93010 EKG 12-LEAD: ICD-10-PCS | Mod: S$GLB,,, | Performed by: INTERNAL MEDICINE

## 2023-09-19 PROCEDURE — 93306 TTE W/DOPPLER COMPLETE: CPT

## 2023-09-19 PROCEDURE — 93005 EKG 12-LEAD: ICD-10-PCS | Mod: S$GLB,,, | Performed by: INTERNAL MEDICINE

## 2023-09-19 PROCEDURE — 93005 ELECTROCARDIOGRAM TRACING: CPT | Mod: S$GLB,,, | Performed by: INTERNAL MEDICINE

## 2023-09-19 PROCEDURE — 75561 CARDIAC MRI FOR MORPH W/DYE: CPT | Mod: 26,,, | Performed by: INTERNAL MEDICINE

## 2023-09-19 PROCEDURE — 93306 TTE W/DOPPLER COMPLETE: CPT | Mod: 26,,, | Performed by: INTERNAL MEDICINE

## 2023-09-19 PROCEDURE — 93010 ELECTROCARDIOGRAM REPORT: CPT | Mod: S$GLB,,, | Performed by: INTERNAL MEDICINE

## 2023-09-19 PROCEDURE — 93306 ECHO (CUPID ONLY): ICD-10-PCS | Mod: 26,,, | Performed by: INTERNAL MEDICINE

## 2023-09-19 PROCEDURE — 75561 CV CARDIAC MRI MORPHOLOGY (CUPID ONLY): ICD-10-PCS | Mod: 26,,, | Performed by: INTERNAL MEDICINE

## 2023-09-19 PROCEDURE — 75557 CARDIAC MRI FOR MORPH: CPT | Mod: 26,,, | Performed by: RADIOLOGY

## 2023-09-19 PROCEDURE — 99499 UNLISTED E&M SERVICE: CPT | Mod: S$GLB,,, | Performed by: INTERNAL MEDICINE

## 2023-09-19 PROCEDURE — 75561 CARDIAC MRI FOR MORPH W/DYE: CPT | Mod: TC

## 2023-09-19 PROCEDURE — 99999 PR PBB SHADOW E&M-EST. PATIENT-LVL III: ICD-10-PCS | Mod: PBBFAC,,, | Performed by: INTERNAL MEDICINE

## 2023-09-19 NOTE — PROGRESS NOTES
Study: CS1-003  Sponsor: [employer]  Visit: Randomization (V3)  Date of Visit: 9/19/2023     Patient wishes to continue in study: Yes  All study protocol required CRFs completed: Yes     Mrs. Sarmiento is here for the CS1-003 Randomization Visit. EKG performed and fasting send out labs (hematology, chemistry, eGFR, BNP and NT-proBNP, ST2 and PATEL-1, biomarkers, and urinalysis) collected. Urine pregnancy test negative. Processing, shipping, and biomarker storage performed per protocol by [facility] staff (RM Carpenter). INR and aPTT not applicable per sponsor (no anticoagulation/bleeding history noted). Vitals signs (supine and sitting) including weight performed. 6 minute walk test with vital signs and Ayala Scale assessment before, immediately following walk, and 3 minutes after walk performed per protocol. Physical exam, RRS 2.0, REVEAL 2 Lite score, and WHO FC assessment performed per Dr. Rolon. CardioMEMS PAP readings taken prior to cMRI by RM and Arline Wilder, FCS with [employer]. cMRI (per cMRI tech Levi Hankins) and echocardiogram (per sonographer Wong Bowling) performed per protocol. Completed PAH-SYMPACT Day 1-6 questionnaires from 9/13/2023-9/18/2023 received from patient and next set of PAH-SYMPACT Day 1-6 questionnaires will be distributed to patient at V4. PAH-SYMPACT Day 7 and MLHFQ questionnaires completed by patient during visit per protocol. No adverse events to report since baseline visit. Current medications reviewed and confirmed with patient, and medication list updated accordingly. Patient reported taking last dose of Plavix 9/17/2023. Actigraphy device uploaded to [employer], compliance confirmed, and study milestone entered. CardioMEMS PAP readings reviewed with Dr. Rolon. Compliance confirmed and PAP readings/waveforms acceptable per Dr. Rolon. Review of inclusion/exclusion criteria performed by [facility] and Dr. Rolon, and study eligibility remains confirmed at this time. Following confirmation,  patient randomized via [email or web address] IRT to CS1-003 960 mg daily dosing. Study drug ordered per Dr. Rolon and dispensed by [facility] Dosing instructions reviewed with patient. Patient instructed to begin 1 (160 mg) capsule in the morning between 7:00-9:00 AM and 2 (320 mg) capsules in the evening between 7:00-9:00 PM. Patient's first dose of study medication to be taken this evening 9/19/2023. Patient instructed to call with any adverse reactions or suspected side effects. Patient to be contacted on 9/22/2023 (Day 4) to assess tolerability and to titrate up to next dosing level if appropriate. Patient and patient's  verbalized complete understanding of all study instructions and education provided. Next visit (V4) scheduled 10/3/2023 beginning at 7:35 AM pending medication tolerability confirmation. Patient and patient's  in agreement with plan and deny any questions or concerns. Randomization V3 visit payment dispensed via [employer]. Will follow.

## 2023-09-19 NOTE — PROGRESS NOTES
Subjective:       HPI:  Ms. Sarmiento is a very pleasant 33 y.o. year old white female with WHO group 1 PH on a stable dose of SQ Remodulin (40 ng/kg/min) and Opsumit 10 mg daily who comes for a follow-up for the Cereno study. She reports WHO FC II symptoms.  She is here today for her randomization visit for The Cereno study (CS1-003). She reports no issues with her PH regimen.     Past Medical History:   Diagnosis Date    Migraine headache 2000    Ongoing    Morbidly obese     Unknown start date; lost over 80 pounds between 1247-6539    Neuropathy 2019    Bilateral feet; ongoing    Ovarian cyst 2017    Bilaterally; ongoing    Pulmonary hypertension 12/01/2022    Ongoing    Severe tricuspid regurgitation 11/09/2022    Resolved 6/28/2023 per echo    Thyroid disease 2013    Ongoing (S/P left partial thyroidectomy)    Vaginal delivery 12/11/2012     Past Surgical History:   Procedure Laterality Date    INSERTION, WIRELESS SENSOR, FOR PULMONARY ARTERIAL PRESSURE MONITORING Right 8/18/2023    Procedure: Insertion, Wireless Sensor, For Pulmonary Arterial Pressure Monitoring;  Surgeon: Maged Rolon MD;  Location: [facility] CATH LAB;  Service: Cardiology;  Laterality: Right;    LEFT HEART CATHETERIZATION Left 12/01/2022    Procedure: Left heart cath;  Surgeon: Kee Magana MD;  Location: [facility] CATH;  Service: Cardiovascular;  Laterality: Left;    RIGHT HEART CATHETERIZATION Right 12/01/2022    Procedure: INSERTION, CATHETER, RIGHT HEART;  Surgeon: Kee Magana MD;  Location: [facility] CATH;  Service: Cardiovascular;  Laterality: Right;  + [place]    RIGHT HEART CATHETERIZATION Right 02/01/2023    Procedure: INSERTION, CATHETER, RIGHT HEART;  Surgeon: Danny Clark MD;  Location: [facility] CATH LAB;  Service: Cardiology;  Laterality: Right;  with nitric oxide study    RIGHT HEART CATHETERIZATION Right 8/18/2023    Procedure: INSERTION, CATHETER, RIGHT HEART;  Surgeon: Maged Rolon MD;  Location: [facility] CATH LAB;  Service:  Cardiology;  Laterality: Right;    SELECTIVE UNILATERAL PULMONARY ANGIOGRAPHY  2023    Procedure: Pumonary angiography - unilateral;  Surgeon: Maged Rolon MD;  Location: [facility] CATH LAB;  Service: Cardiology;;    THYROIDECTOMY, PARTIAL Left      OB History          1    Para        Term                AB        Living   1         SAB        IAB        Ectopic        Multiple        Live Births                   Review of Systems   Constitutional: Negative. Negative for chills, decreased appetite, diaphoresis, fever, malaise/fatigue, night sweats, weight gain and weight loss.   Eyes: Negative.    Cardiovascular:  Positive for dyspnea on exertion. Negative for chest pain, claudication, cyanosis, irregular heartbeat, leg swelling, near-syncope, orthopnea, palpitations, paroxysmal nocturnal dyspnea and syncope.   Respiratory:  Negative for cough, hemoptysis and shortness of breath.    Endocrine: Negative.    Hematologic/Lymphatic: Negative.    Skin:  Negative for color change, dry skin and nail changes.   Musculoskeletal: Negative.    Gastrointestinal: Negative.    Genitourinary: Negative.    Neurological:  Negative for weakness.       Objective:   Blood pressure 116/68, pulse 76, height 5' (1.524 m), weight 73.8 kg (162 lb 11.2 oz), SpO2 99 %.body mass index is 31.78 kg/m².  Physical Exam  Vitals and nursing note reviewed.   Constitutional:       Appearance: She is well-developed.   HENT:      Head: Normocephalic.   Eyes:      Pupils: Pupils are equal, round, and reactive to light.   Neck:      Vascular: No JVD.   Cardiovascular:      Rate and Rhythm: Normal rate and regular rhythm.      Chest Wall: PMI is not displaced.      Pulses: Intact distal pulses.      Heart sounds: Normal heart sounds. No murmur heard.     No friction rub. No gallop.      Comments: Loud S2  Pulmonary:      Effort: Pulmonary effort is normal.      Breath sounds: Normal breath sounds. No wheezing or rales.   Abdominal:       General: Bowel sounds are normal.      Palpations: Abdomen is soft.   Musculoskeletal:      Cervical back: Neck supple.   Neurological:      Mental Status: She is alert and oriented to person, place, and time.         Labs:    Chemistry        Component Value Date/Time     08/18/2023 0844    K 3.3 (L) 08/18/2023 0844     08/18/2023 0844    CO2 24 08/18/2023 0844    BUN 12 08/18/2023 0844    CREATININE 0.8 08/18/2023 0844    GLU 84 08/18/2023 0844        Component Value Date/Time    CALCIUM 9.0 08/18/2023 0844    ALKPHOS 121 08/18/2023 0844    AST 25 08/18/2023 0844    ALT 40 08/18/2023 0844    BILITOT 0.6 08/18/2023 0844          Magnesium   Date Value Ref Range Status   06/28/2023 2.1 1.6 - 2.6 mg/dL Final     Lab Results   Component Value Date    WBC 6.22 08/18/2023    HGB 11.0 (L) 08/18/2023    HCT 35.5 (L) 08/18/2023     08/18/2023     Lab Results   Component Value Date    INR 0.9 08/18/2023     BNP   Date Value Ref Range Status   06/28/2023 58 0 - 99 pg/mL Final     Comment:     Values of less than 100 pg/ml are consistent with non-CHF populations.   01/10/2023 235 (H) 0 - 99 pg/mL Final     Comment:     Values of less than 100 pg/ml are consistent with non-CHF populations.       Assessment:      1. WHO group 1 pulmonary arterial hypertension    2. High risk medication use - SC Remodulin    3. Chronic pulmonary heart disease        Plan:   WHO group 1 PH with WHO FC II symptoms on a  stable PH regimen (Remodulin 40ng/kg/min since 5/12/2023) and Opsumit 10 mg daily since 2/13/2023.   Patient is here for her randomization for the CERENO study.  S/p CardioMEMS Implant (August 18, 2023). She has completed 1 month of DAPT and is no longer on clopidogrel (last dose on 9/17/2023)     Recommend 2 gram sodium restriction and 1500cc fluid restriction.  Encourage physical activity with graded exercise program.  Requested patient to weigh themselves daily, and to notify us if their weight increases  by more than 3 lbs in 1 day or 5 lbs in 1 week.      Maged Rolon MD

## 2023-09-22 ENCOUNTER — RESEARCH ENCOUNTER (OUTPATIENT)
Dept: RESEARCH | Facility: HOSPITAL | Age: 33
End: 2023-09-22
Payer: COMMERCIAL

## 2023-09-22 NOTE — PROGRESS NOTES
Study: CS1-003  Sponsor: Cole Martin  Visit: Day 4 Call  Date of Visit: 9/22/2023     Patient wishes to continue in study: Yes  All study protocol required CRFs completed: Yes     Mrs. Sarmiento was contacted on CS1-003 Day 4 for assessment. Patient denies any side effects or adverse events and reports tolerating the study medication well. No issues or complaints reported. Since CS1-003 480 mg daily dose well-tolerated, study drug to be uptitrated to goal dosing group of 960 mg daily. Dosing instructions reviewed with patient. Patient instructed to begin 2 (320 mg) capsules in the morning between 7:00-9:00 AM and 4 (640 mg) capsules in the evening between 7:00-9:00 PM. Patient's first dose of uptitrated study medication to be taken this evening 9/22/2023. Patient instructed to call with any adverse reactions or suspected side effects. Patient to be contacted on 9/29/2023 (Day 11) to assess for tolerability and any adverse events or side effects. Patient verbalized complete understanding of all instructions and education provided. Patient denies any questions or concerns at this time. Will follow.

## 2023-09-25 DIAGNOSIS — I27.21 WHO GROUP 1 PULMONARY ARTERIAL HYPERTENSION: Primary | ICD-10-CM

## 2023-09-25 DIAGNOSIS — Z00.6 EXAMINATION OF PARTICIPANT IN CLINICAL TRIAL: ICD-10-CM

## 2023-09-26 ENCOUNTER — PATIENT MESSAGE (OUTPATIENT)
Dept: TRANSPLANT | Facility: CLINIC | Age: 33
End: 2023-09-26
Payer: COMMERCIAL

## 2023-09-27 ENCOUNTER — PATIENT MESSAGE (OUTPATIENT)
Dept: RESEARCH | Facility: HOSPITAL | Age: 33
End: 2023-09-27
Payer: COMMERCIAL

## 2023-09-27 DIAGNOSIS — I27.21 WHO GROUP 1 PULMONARY ARTERIAL HYPERTENSION: Primary | ICD-10-CM

## 2023-09-27 DIAGNOSIS — Z00.6 EXAMINATION OF PARTICIPANT IN CLINICAL TRIAL: ICD-10-CM

## 2023-09-29 ENCOUNTER — RESEARCH ENCOUNTER (OUTPATIENT)
Dept: RESEARCH | Facility: HOSPITAL | Age: 33
End: 2023-09-29
Payer: COMMERCIAL

## 2023-09-29 NOTE — PROGRESS NOTES
Study: CS1-003  Sponsor: [employer]  Visit: Day 11 Call  Date of Visit: 9/29/2023     Patient wishes to continue in study: Yes  All study protocol required CRFs completed: Yes     Mrs. Sarmiento was contacted on 1-003 Day 11 for assessment. Patient denies any side effects or adverse events and reports tolerating the study medication well. No issues or complaints since study drug uptitrated to goal dose of 960 mg daily. No changes to current medications reported. Patient to continue 2 (320 mg) capsules in the morning between 7:00-9:00 AM and 4 (640 mg) capsules in the evening between 7:00-9:00 PM for the remainder of the study. Patient instructed to call with any adverse reactions or suspected side effects. Patient's next visit is Visit 4 (Day 15) scheduled on 10/3/2023 beginning at 7:35 AM for PK sampling and reassessment of patient status, study drug tolerability, and adverse events. Patient instructed to not take study drug on the morning of visit. Study drug to be taken in clinic following lab appointment. Patient verbalized complete understanding of all instructions and education provided and remains in agreement with plan. Patient denies any questions or concerns at this time. Will follow.

## 2023-10-03 ENCOUNTER — LAB VISIT (OUTPATIENT)
Dept: LAB | Facility: HOSPITAL | Age: 33
End: 2023-10-03
Payer: COMMERCIAL

## 2023-10-03 ENCOUNTER — RESEARCH ENCOUNTER (OUTPATIENT)
Dept: RESEARCH | Facility: HOSPITAL | Age: 33
End: 2023-10-03
Payer: COMMERCIAL

## 2023-10-03 DIAGNOSIS — Z00.6 EXAMINATION OF PARTICIPANT IN CLINICAL TRIAL: ICD-10-CM

## 2023-10-03 DIAGNOSIS — I27.21 WHO GROUP 1 PULMONARY ARTERIAL HYPERTENSION: ICD-10-CM

## 2023-10-03 LAB — RESEARCH LAB: NORMAL

## 2023-10-03 PROCEDURE — 36415 COLL VENOUS BLD VENIPUNCTURE: CPT | Performed by: INTERNAL MEDICINE

## 2023-10-03 NOTE — PROGRESS NOTES
Study: CS1-003  Sponsor: [employer]  Visit: Visit 4 (V4)  Date of Visit: 10/3/2023     Patient wishes to continue in study: Yes  All study protocol required CRFs completed: Yes     Mrs. Sarmiento is here for the CS1-003 Visit 4. Fasting send out lab (PK) collected prior to patient's morning dose of study drug. V3 biomarker backup samples to be shipped with V4 PK sample per lab manual instructions. Processing, shipping, and storage performed per protocol by [facility] staff (Amish Maciel, [facility]). Study drug taken in clinic at 0735 following lab collection. Patient continues to report WHO FC II symptoms upon assessment. Patient denies any symptoms/side effects or adverse events since randomization visit and reports overall tolerability of CS1-003 960 mg daily dosing. Patient to continue taking 2 (320 mg) capsules in the morning between 7:00-9:00 AM and 4 (640 mg) capsules in the evening between 7:00-9:00 PM. Patient instructed to call with any adverse reactions or suspected side effects. All concomitant medications reviewed with patient and no changes noted since last visit. Actigraphy device uploaded to [employer], compliance confirmed, and study milestone entered. CardioMEMS PAP readings reviewed. Compliance confirmed (100%) and PAP readings/waveform quality remains acceptable. Next visit (V5) to occur 10/17/2023 beginning at 8:00 AM. Next set of PAH-SYMPACT Day 1-6 questionnaires distributed to patient with instructions for completion prior to V5. Patient to begin PAH-SYMPACT Day 1 on 10/11/2023 and continue questionnaires daily until V5 10/17/2023. Patient and patient's  verbalized complete understanding and are in agreement with plan. No questions or concerns at this time. V4 visit payment dispensed via [employer]. Will follow.

## 2023-10-04 NOTE — Clinical Note
SUBJECTIVE:  Arnel Martinez is seen today at the request of Lucrecia Mancia DO. Mr. Martinez was here today for a hearing evaluation. He reported that he has noticed decreased hearing and fullness in his left ear. He does currently wear hearing aids that he purchased elsewhere and was told that his hearing was down. He stated that he did have a stroke in May the day before [employer] and feels he has been having concerns with the left ear following that. He reported occasional bilateral tinnitus. He reported occasional unsteadiness when his eyes are closed or when he turns quickly.    Patient denied otalgia.    OBJECTIVE:  AUDIOMETRIC RESULTS  Otoscopic Evaluation:  Examination reveals clear ear canals bilaterally    Audiogram:  Right ear:  Pure tone air and bone conduction thresholds revealed borderline normal hearing sloping to a profound sensorineural hearing loss.  Left ear:  Pure tone air and bone conduction thresholds revealed borderline normal hearing sloping to a profound mixed hearing loss.    Speech Audiometry:  Speech Reception Thresholds:  40 dB HL right ear and 50 dB HL left ear.  Word Recognition Test Scores:  92% right ear when presented at 80 dB HL in quiet and 80% left ear when presented at 90 dB HL in quiet with masking in the opposite ear.    Acoustic Immittance:  Right ear:  Negative middle ear pressure  Normal ear drum mobility   Normal equivalent ear canal volume    Left ear:  Negative middle ear pressure  Normal ear drum mobility   Normal equivalent ear canal volume    IMPRESSIONS:  1. Mixed conductive and sensorineural hearing loss of left ear with restricted hearing of right ear    2. Sensorineural hearing loss, unilateral, right ear, with restricted hearing on the contralateral side    3. Sensation of fullness in ear, left    Hearing is borderline normal to a profound sensorineural hearing loss in the right ear and borderline normal sloping to a profound mixed hearing loss in the  dry, intact, no bleeding and no hematoma. R groin left ear. Impedance testing revealed negative pressure peak and normal compliance in both ears.      RECOMMENDATIONS:  These results were reviewed with the patient and will be forwarded to Lucrecia Mancia DO.     Mr. Martinez is scheduled to see Dr. Caraballo today. Recommended continued use of his hearing aids. Mr. Martinez should have his hearing rechecked again following any form of medical treatment or sooner if changes arise.    The patient indicated understanding of the diagnosis and was encouraged to contact our department with any questions or concerns.

## 2023-10-05 DIAGNOSIS — I27.20 PULMONARY HTN: Primary | ICD-10-CM

## 2023-10-16 ENCOUNTER — LAB VISIT (OUTPATIENT)
Dept: LAB | Facility: HOSPITAL | Age: 33
End: 2023-10-16
Payer: COMMERCIAL

## 2023-10-16 DIAGNOSIS — Z32.00 ENCOUNTER FOR PREGNANCY TEST, RESULT UNKNOWN: ICD-10-CM

## 2023-10-16 LAB — B-HCG UR QL: NEGATIVE

## 2023-10-16 PROCEDURE — 81025 URINE PREGNANCY TEST: CPT

## 2023-10-17 ENCOUNTER — HOSPITAL ENCOUNTER (OUTPATIENT)
Dept: CARDIOLOGY | Facility: CLINIC | Age: 33
Discharge: HOME | End: 2023-10-17
Payer: COMMERCIAL

## 2023-10-17 ENCOUNTER — RESEARCH ENCOUNTER (OUTPATIENT)
Dept: RESEARCH | Facility: HOSPITAL | Age: 33
End: 2023-10-17
Payer: COMMERCIAL

## 2023-10-17 ENCOUNTER — OFFICE VISIT (OUTPATIENT)
Dept: TRANSPLANT | Facility: CLINIC | Age: 33
End: 2023-10-17
Payer: COMMERCIAL

## 2023-10-17 VITALS
OXYGEN SATURATION: 96 % | RESPIRATION RATE: 19 BRPM | WEIGHT: 167.75 LBS | SYSTOLIC BLOOD PRESSURE: 102 MMHG | HEART RATE: 80 BPM | TEMPERATURE: 98 F | BODY MASS INDEX: 32.77 KG/M2 | DIASTOLIC BLOOD PRESSURE: 60 MMHG

## 2023-10-17 VITALS
HEIGHT: 60 IN | HEART RATE: 75 BPM | WEIGHT: 168 LBS | SYSTOLIC BLOOD PRESSURE: 104 MMHG | OXYGEN SATURATION: 96 % | DIASTOLIC BLOOD PRESSURE: 59 MMHG | BODY MASS INDEX: 32.98 KG/M2

## 2023-10-17 DIAGNOSIS — I27.21 WHO GROUP 1 PULMONARY ARTERIAL HYPERTENSION: ICD-10-CM

## 2023-10-17 DIAGNOSIS — Z00.6 EXAMINATION OF PARTICIPANT IN CLINICAL TRIAL: ICD-10-CM

## 2023-10-17 DIAGNOSIS — Z79.899 HIGH RISK MEDICATION USE: ICD-10-CM

## 2023-10-17 DIAGNOSIS — I07.1 SEVERE TRICUSPID REGURGITATION: ICD-10-CM

## 2023-10-17 DIAGNOSIS — I27.21 WHO GROUP 1 PULMONARY ARTERIAL HYPERTENSION: Primary | ICD-10-CM

## 2023-10-17 DIAGNOSIS — I27.9 CHRONIC PULMONARY HEART DISEASE: ICD-10-CM

## 2023-10-17 PROCEDURE — 99999 PR PBB SHADOW E&M-EST. PATIENT-LVL III: CPT | Mod: PBBFAC,,, | Performed by: INTERNAL MEDICINE

## 2023-10-17 PROCEDURE — 99215 PR OFFICE/OUTPT VISIT, EST, LEVL V, 40-54 MIN: ICD-10-PCS | Mod: S$GLB,,, | Performed by: INTERNAL MEDICINE

## 2023-10-17 PROCEDURE — 93005 EKG 12-LEAD: ICD-10-PCS | Mod: S$GLB,,, | Performed by: INTERNAL MEDICINE

## 2023-10-17 PROCEDURE — 99215 OFFICE O/P EST HI 40 MIN: CPT | Mod: S$GLB,,, | Performed by: INTERNAL MEDICINE

## 2023-10-17 PROCEDURE — 99999 PR PBB SHADOW E&M-EST. PATIENT-LVL III: ICD-10-PCS | Mod: PBBFAC,,, | Performed by: INTERNAL MEDICINE

## 2023-10-17 PROCEDURE — 93005 ELECTROCARDIOGRAM TRACING: CPT | Mod: S$GLB,,, | Performed by: INTERNAL MEDICINE

## 2023-10-17 PROCEDURE — 93010 EKG 12-LEAD: ICD-10-PCS | Mod: S$GLB,,, | Performed by: INTERNAL MEDICINE

## 2023-10-17 PROCEDURE — 93010 ELECTROCARDIOGRAM REPORT: CPT | Mod: S$GLB,,, | Performed by: INTERNAL MEDICINE

## 2023-10-17 NOTE — PROGRESS NOTES
Study: CS1-003  Sponsor: [employer]  Visit: Visit 5 (V5)  Date of Visit: 10/17/2023     Patient wishes to continue in study: Yes  All study protocol required CRFs completed: Yes     Mrs. Sarmiento is here for the 1-003 Visit 5. EKG performed and fasting send out labs (hematology, chemistry, eGFR, BNP and NT-proBNP, ST2 and PATEL-1, biomarkers, and urinalysis) collected. PK collected prior to patient's morning dose of study drug. Study drug taken at 0824 following lab collection. V4 PK backup sample to be shipped with V5 frozen samples per lab manual instructions. Urine pregnancy test negative. Processing, shipping, and biomarker and PK storage performed per protocol by [facility] staff (Estelita Carrasco, [facility]). INR and aPTT not applicable per sponsor (no anticoagulation/bleeding history noted). Vitals signs (supine and sitting) including weight performed. 6 minute walk test with vital signs and Ayala Scale assessment before, immediately following walk, and 3 minutes after walk performed per protocol. Physical exam, RRS 2.0, REVEAL 2 Lite score, and WHO FC assessment performed per Dr. Rolon. Completed PAH-SYMPACT Day 1-6 questionnaires from 10/11/2023-10/16/2023 received from patient, and next set of PAH-SYMPACT Day 1-6 questionnaires distributed to patient with instructions for completion prior to V7. Patient to begin PAH-SYMPACT Day 1 on 11/8/2023 and continue questionnaires daily until V7 11/14/2023. PAH-SYMPACT Day 7 and MLHFQ questionnaires completed by patient during visit per protocol. Current medications reviewed and confirmed with patient with no changes noted since last visit. Adverse events assessed and clinical significance reviewed with Dr. Rolon. Patient reports right abdominal infusion site pain due to recent Remodulin site change 4 days ago (10/13/2023). Patient reports pain and redness to site, but states that this occurs with every site change and usually lasts about 5 days. Patient denies taking any OTC  medications for pain and reports using an ice pack and resting as treatment. Will follow event for resolution. Patient denies any other adverse events or side effects and reports tolerating the study medication well. No issues or complaints at goal dose of 960 mg daily. Actigraphy device uploaded to [employer], compliance confirmed, and study milestone entered. CardioMEMS PAP readings reviewed with Dr. Rolon. Compliance confirmed (100%) and PAP readings/waveforms acceptable per Dr. Rolon. Study drug accountability performed with 159 capsules actually dosed/159 capsules expected dosed resulting in 100% treatment compliance (compliant per protocol). Study accountability entered and new bottles of study drug assigned via [employer]. Study drug ordered per Dr. Rolon and dispensed by [facility] Dosing instructions reviewed with patient. Patient instructed to continue 960 mg daily dosing of 2 (320 mg) capsules in the morning between 7:00-9:00 AM and 4 (640 mg) capsules in the evening between 7:00-9:00 PM. Patient instructed to call with any adverse reactions/events or suspected side effects. Patient and patient's  verbalized complete understanding of all study instructions and education provided. Patient to be contacted on 10/31/2023 for Visit 6 (V6) call. Next clinic visit is Visit 7 (V7) scheduled 11/14/2023 beginning at 7:30 AM. Patient and patient's  in agreement with plan and deny any questions or concerns. V5 visit payment dispensed via [employer]. Will follow.

## 2023-10-17 NOTE — PROGRESS NOTES
Subjective:       HPI:  Ms. Sarmiento is a very pleasant 33 y.o. year old white female with WHO group 1 PH on a stable dose of SQ Remodulin (40 ng/kg/min) and Opsumit 10 mg daily who comes for a follow-up and screening for the Cereno study. She reports WHO FC II symptoms.  She is here for her visit 5 of The Cereno study (CS1-003). She reports no issues with her PH regimen.    Past Medical History:   Diagnosis Date    BMI 32.0-32.9,adult 11/9/2022    Migraine headache 2000    Ongoing    Morbidly obese     Unknown start date; lost over 80 pounds between 5354-5652    Neuropathy 2019    Bilateral feet; ongoing    Ovarian cyst 2017    Bilaterally; ongoing    Pulmonary hypertension 12/01/2022    Ongoing    Severe tricuspid regurgitation 11/09/2022    Resolved 6/28/2023 per echo    Thyroid disease 2013    Ongoing (S/P left partial thyroidectomy)    Vaginal delivery 12/11/2012     Past Surgical History:   Procedure Laterality Date    INSERTION, WIRELESS SENSOR, FOR PULMONARY ARTERIAL PRESSURE MONITORING Right 8/18/2023    Procedure: Insertion, Wireless Sensor, For Pulmonary Arterial Pressure Monitoring;  Surgeon: Maged Rolon MD;  Location: [facility] CATH LAB;  Service: Cardiology;  Laterality: Right;    LEFT HEART CATHETERIZATION Left 12/01/2022    Procedure: Left heart cath;  Surgeon: Kee Magana MD;  Location: [facility] CATH;  Service: Cardiovascular;  Laterality: Left;    RIGHT HEART CATHETERIZATION Right 12/01/2022    Procedure: INSERTION, CATHETER, RIGHT HEART;  Surgeon: Kee Magana MD;  Location: [facility] CATH;  Service: Cardiovascular;  Laterality: Right;  + [place]    RIGHT HEART CATHETERIZATION Right 02/01/2023    Procedure: INSERTION, CATHETER, RIGHT HEART;  Surgeon: Danny Clark MD;  Location: [facility] CATH LAB;  Service: Cardiology;  Laterality: Right;  with nitric oxide study    RIGHT HEART CATHETERIZATION Right 8/18/2023    Procedure: INSERTION, CATHETER, RIGHT HEART;  Surgeon: Maged Rolon MD;  Location:  [facility] CATH LAB;  Service: Cardiology;  Laterality: Right;    SELECTIVE UNILATERAL PULMONARY ANGIOGRAPHY  2023    [facility];  Surgeon: Maged Rolon MD;  Location: [facility] CATH LAB;  Service: Cardiology;;    THYROIDECTOMY, PARTIAL Left      OB History          1    Para        Term                AB        Living   1         SAB        IAB        Ectopic        Multiple        Live Births                   Review of Systems   Constitutional: Negative. Negative for chills, decreased appetite, diaphoresis, fever, malaise/fatigue, night sweats, weight gain and weight loss.   Eyes: Negative.    Cardiovascular:  Positive for dyspnea on exertion. Negative for chest pain, claudication, cyanosis, irregular heartbeat, leg swelling, near-syncope, orthopnea, palpitations, paroxysmal nocturnal dyspnea and syncope.   Respiratory:  Negative for cough, hemoptysis and shortness of breath.    Endocrine: Negative.    Hematologic/Lymphatic: Negative.    Skin:  Negative for color change, dry skin and nail changes.   Musculoskeletal: Negative.    Gastrointestinal: Negative.    Genitourinary: Negative.    Neurological:  Negative for weakness.       Objective:   Blood pressure (!) 104/59, pulse 75, height 5' (1.524 m), weight 76.2 kg (167 lb 15.9 oz), SpO2 96 %.body mass index is 32.81 kg/m².  Physical Exam  Vitals and nursing note reviewed.   Constitutional:       Appearance: She is well-developed.   HENT:      Head: Normocephalic.   Eyes:      Pupils: Pupils are equal, round, and reactive to light.   Neck:      Vascular: No JVD.   Cardiovascular:      Rate and Rhythm: Normal rate and regular rhythm.      Chest Wall: PMI is not displaced.      Pulses: Intact distal pulses.      Heart sounds: Normal heart sounds. No murmur heard.     No friction rub. No gallop.      Comments: Loud S2  Pulmonary:      Effort: Pulmonary effort is normal.      Breath sounds: Normal breath sounds. No  wheezing or rales.   Abdominal:      General: Bowel sounds are normal.      Palpations: Abdomen is soft.   Musculoskeletal:      Cervical back: Neck supple.   Neurological:      Mental Status: She is alert and oriented to person, place, and time.         Labs:    Chemistry        Component Value Date/Time     08/18/2023 0844    K 3.3 (L) 08/18/2023 0844     08/18/2023 0844    CO2 24 08/18/2023 0844    BUN 12 08/18/2023 0844    CREATININE 0.8 08/18/2023 0844    GLU 84 08/18/2023 0844        Component Value Date/Time    CALCIUM 9.0 08/18/2023 0844    ALKPHOS 121 08/18/2023 0844    AST 25 08/18/2023 0844    ALT 40 08/18/2023 0844    BILITOT 0.6 08/18/2023 0844          Magnesium   Date Value Ref Range Status   06/28/2023 2.1 1.6 - 2.6 mg/dL Final     Lab Results   Component Value Date    WBC 6.22 08/18/2023    HGB 11.0 (L) 08/18/2023    HCT 35.5 (L) 08/18/2023     08/18/2023     Lab Results   Component Value Date    INR 0.9 08/18/2023     BNP   Date Value Ref Range Status   06/28/2023 58 0 - 99 pg/mL Final     Comment:     Values of less than 100 pg/ml are consistent with non-CHF populations.   01/10/2023 235 (H) 0 - 99 pg/mL Final     Comment:     Values of less than 100 pg/ml are consistent with non-CHF populations.       Assessment:      1. WHO group 1 pulmonary arterial hypertension    2. Severe tricuspid regurgitation    3. High risk medication use - SC Remodulin    4. Chronic pulmonary heart disease    5. BMI 32.0-32.9,adult        Plan:   WHO group 1 PH with WHO FC II symptoms on a  stable PH regimen (Remodulin 40ng/kg/min since 5/12/2023) and Opsumit 10 mg daily since 2/13/2023.   Patient is enrolled in the CERENO study. This is her Visit 5. She is tolerating her study drug   Recommend 2 gram sodium restriction and 1500cc fluid restriction.  Encourage physical activity with graded exercise program.  Requested patient to weigh themselves daily, and to notify us if their weight increases by  more than 3 lbs in 1 day or 5 lbs in 1 week.   RTC on 11/14/2023     Maged Rolon MD

## 2023-10-31 ENCOUNTER — RESEARCH ENCOUNTER (OUTPATIENT)
Dept: RESEARCH | Facility: HOSPITAL | Age: 33
End: 2023-10-31
Payer: COMMERCIAL

## 2023-10-31 NOTE — PROGRESS NOTES
Study: CS1-003  Sponsor: [employer]  Visit: Visit 6 (V6) Telephone Call  Date of Visit: 10/31/2023     Patient wishes to continue in study: Yes  All study protocol required CRFs completed: Yes     Mrs. Sarmiento was contacted for the 1-003 Visit 6 telephone call. Patient continues to report WHO FC II symptoms upon assessment. Adverse events reassessed since last visit. Patient reported right abdominal infusion site pain due to Remodulin site change on 10/13/2023 has resolved as of 10/19/2023. Patient denied any further treatment that lead to resolution. Patient denies any other adverse events or symptoms/side effects and reports tolerating the study medication well since last visit. No issues or complaints at goal dose of 960 mg daily. Patient to continue taking 2 (320 mg) capsules in the morning between 7:00-9:00 AM and 4 (640 mg) capsules in the evening between 7:00-9:00 PM. Patient instructed to call with any adverse reactions or suspected side effects. All concomitant medications reviewed with patient and no changes noted since last visit. Actigraphy device wear compliance viewed and study milestone entered. Encouraged patient to continue wearing actigraphy device at all times. CardioMEMS PAP readings reviewed. Compliance confirmed (100%) and PAP readings/waveform quality remains acceptable. Next visit (V7) to occur 11/14/2023 beginning at 7:30 AM. Patient verbalized complete understanding and is in agreement with plan. No questions or concerns at this time. V6 visit payment dispensed via [employer]. Will follow.

## 2023-11-08 ENCOUNTER — PATIENT MESSAGE (OUTPATIENT)
Dept: RESEARCH | Facility: HOSPITAL | Age: 33
End: 2023-11-08
Payer: COMMERCIAL

## 2023-11-13 ENCOUNTER — LAB VISIT (OUTPATIENT)
Dept: LAB | Facility: HOSPITAL | Age: 33
End: 2023-11-13
Payer: COMMERCIAL

## 2023-11-13 DIAGNOSIS — Z32.00 ENCOUNTER FOR PREGNANCY TEST, RESULT UNKNOWN: ICD-10-CM

## 2023-11-13 LAB — B-HCG UR QL: NEGATIVE

## 2023-11-13 PROCEDURE — 81025 URINE PREGNANCY TEST: CPT

## 2023-11-14 ENCOUNTER — LAB VISIT (OUTPATIENT)
Dept: LAB | Facility: HOSPITAL | Age: 33
End: 2023-11-14
Attending: INTERNAL MEDICINE
Payer: COMMERCIAL

## 2023-11-14 ENCOUNTER — HOSPITAL ENCOUNTER (OUTPATIENT)
Dept: CARDIOLOGY | Facility: CLINIC | Age: 33
Discharge: HOME | End: 2023-11-14
Payer: COMMERCIAL

## 2023-11-14 ENCOUNTER — OFFICE VISIT (OUTPATIENT)
Dept: TRANSPLANT | Facility: CLINIC | Age: 33
End: 2023-11-14
Payer: COMMERCIAL

## 2023-11-14 ENCOUNTER — RESEARCH ENCOUNTER (OUTPATIENT)
Dept: RESEARCH | Facility: HOSPITAL | Age: 33
End: 2023-11-14
Payer: COMMERCIAL

## 2023-11-14 VITALS
RESPIRATION RATE: 19 BRPM | HEART RATE: 76 BPM | OXYGEN SATURATION: 97 % | TEMPERATURE: 98 F | BODY MASS INDEX: 32.81 KG/M2 | DIASTOLIC BLOOD PRESSURE: 61 MMHG | WEIGHT: 168 LBS | SYSTOLIC BLOOD PRESSURE: 99 MMHG

## 2023-11-14 VITALS
HEART RATE: 77 BPM | HEIGHT: 60 IN | OXYGEN SATURATION: 100 % | SYSTOLIC BLOOD PRESSURE: 113 MMHG | DIASTOLIC BLOOD PRESSURE: 61 MMHG | BODY MASS INDEX: 33.19 KG/M2 | WEIGHT: 169.06 LBS

## 2023-11-14 DIAGNOSIS — I27.21 WHO GROUP 1 PULMONARY ARTERIAL HYPERTENSION: Primary | ICD-10-CM

## 2023-11-14 DIAGNOSIS — Z00.6 EXAMINATION OF PARTICIPANT IN CLINICAL TRIAL: ICD-10-CM

## 2023-11-14 DIAGNOSIS — Z79.899 HIGH RISK MEDICATION USE: ICD-10-CM

## 2023-11-14 DIAGNOSIS — I27.21 WHO GROUP 1 PULMONARY ARTERIAL HYPERTENSION: ICD-10-CM

## 2023-11-14 DIAGNOSIS — I07.1 SEVERE TRICUSPID REGURGITATION: ICD-10-CM

## 2023-11-14 LAB — RESEARCH LAB: NORMAL

## 2023-11-14 PROCEDURE — 93010 ELECTROCARDIOGRAM REPORT: CPT | Mod: S$GLB,,, | Performed by: INTERNAL MEDICINE

## 2023-11-14 PROCEDURE — 93010 EKG 12-LEAD: ICD-10-PCS | Mod: S$GLB,,, | Performed by: INTERNAL MEDICINE

## 2023-11-14 PROCEDURE — 99499 NO LOS: ICD-10-PCS | Mod: S$GLB,,, | Performed by: INTERNAL MEDICINE

## 2023-11-14 PROCEDURE — 93005 EKG 12-LEAD: ICD-10-PCS | Mod: S$GLB,,, | Performed by: INTERNAL MEDICINE

## 2023-11-14 PROCEDURE — 36415 COLL VENOUS BLD VENIPUNCTURE: CPT | Performed by: INTERNAL MEDICINE

## 2023-11-14 PROCEDURE — 93005 ELECTROCARDIOGRAM TRACING: CPT | Mod: S$GLB,,, | Performed by: INTERNAL MEDICINE

## 2023-11-14 PROCEDURE — 99499 UNLISTED E&M SERVICE: CPT | Mod: S$GLB,,, | Performed by: INTERNAL MEDICINE

## 2023-11-14 NOTE — PROGRESS NOTES
Subjective:     HPI:  Ms. Sarmiento is a very pleasant 33 y.o. year old white female with WHO group 1 PH on a stable dose of SQ Remodulin (40 ng/kg/min) and Opsumit 10 mg daily who comes for a follow-up and screening for the Cereno study. She reports WHO FC II symptoms.  She is here for her visit 7 of The Cereno study (CS1-003). She reports no issues with her PH regimen.     Past Medical History:   Diagnosis Date    BMI 32.0-32.9,adult 11/9/2022    Migraine headache 2000    Ongoing    Morbidly obese     Unknown start date; lost over 80 pounds between 0264-7975    Neuropathy 2019    Bilateral feet; ongoing    Ovarian cyst 2017    Bilaterally; ongoing    Pulmonary hypertension 12/01/2022    Ongoing    Severe tricuspid regurgitation 11/09/2022    Resolved 6/28/2023 per echo    Thyroid disease 2013    Ongoing (S/P left partial thyroidectomy)    Vaginal delivery 12/11/2012     Past Surgical History:   Procedure Laterality Date    INSERTION, WIRELESS SENSOR, FOR PULMONARY ARTERIAL PRESSURE MONITORING Right 8/18/2023    Procedure: Insertion, Wireless Sensor, For Pulmonary Arterial Pressure Monitoring;  Surgeon: Maged Rolon MD;  Location: [facility] CATH LAB;  Service: Cardiology;  Laterality: Right;    LEFT HEART CATHETERIZATION Left 12/01/2022    Procedure: Left heart cath;  Surgeon: Kee Magana MD;  Location: [facility] CATH;  Service: Cardiovascular;  Laterality: Left;    RIGHT HEART CATHETERIZATION Right 12/01/2022    Procedure: INSERTION, CATHETER, RIGHT HEART;  Surgeon: Kee Magana MD;  Location: [facility] CATH;  Service: Cardiovascular;  Laterality: Right;  + [place]    RIGHT HEART CATHETERIZATION Right 02/01/2023    Procedure: INSERTION, CATHETER, RIGHT HEART;  Surgeon: Danny Clark MD;  Location: [facility] CATH LAB;  Service: Cardiology;  Laterality: Right;  with nitric oxide study    RIGHT HEART CATHETERIZATION Right 8/18/2023    Procedure: INSERTION, CATHETER, RIGHT HEART;  Surgeon: Maged Rolon MD;   Location: [facility] CATH LAB;  Service: Cardiology;  Laterality: Right;    SELECTIVE UNILATERAL PULMONARY ANGIOGRAPHY  2023    [facility];  Surgeon: Maged Rolon MD;  Location: [facility] CATH LAB;  Service: Cardiology;;    THYROIDECTOMY, PARTIAL Left      OB History          1    Para        Term                AB        Living   1         SAB        IAB        Ectopic        Multiple        Live Births                   Review of Systems   Constitutional: Negative. Negative for chills, decreased appetite, diaphoresis, fever, malaise/fatigue, night sweats, weight gain and weight loss.   Eyes: Negative.    Cardiovascular:  Positive for dyspnea on exertion. Negative for chest pain, claudication, cyanosis, irregular heartbeat, leg swelling, near-syncope, orthopnea, palpitations, paroxysmal nocturnal dyspnea and syncope.   Respiratory:  Negative for cough, hemoptysis and shortness of breath.    Endocrine: Negative.    Hematologic/Lymphatic: Negative.    Skin:  Negative for color change, dry skin and nail changes.   Musculoskeletal: Negative.    Gastrointestinal: Negative.    Genitourinary: Negative.    Neurological:  Negative for weakness.       Objective:   Blood pressure 113/61, pulse 77, height 5' (1.524 m), weight 76.7 kg (169 lb 1.5 oz), SpO2 100 %.body mass index is 33.02 kg/m².  Physical Exam  Vitals and nursing note reviewed.   Constitutional:       Appearance: She is well-developed.   HENT:      Head: Normocephalic.   Eyes:      Pupils: Pupils are equal, round, and reactive to light.   Neck:      Vascular: No JVD.   Cardiovascular:      Rate and Rhythm: Normal rate and regular rhythm.      Chest Wall: PMI is not displaced.      Pulses: Intact distal pulses.      Heart sounds: Normal heart sounds. No murmur heard.     No friction rub. No gallop.      Comments: Loud S2  Pulmonary:      Effort: Pulmonary effort is normal.      Breath sounds: Normal breath  sounds. No wheezing or rales.   Abdominal:      General: Bowel sounds are normal.      Palpations: Abdomen is soft.   Musculoskeletal:      Cervical back: Neck supple.   Neurological:      Mental Status: She is alert and oriented to person, place, and time.       Labs:    Chemistry        Component Value Date/Time     08/18/2023 0844    K 3.3 (L) 08/18/2023 0844     08/18/2023 0844    CO2 24 08/18/2023 0844    BUN 12 08/18/2023 0844    CREATININE 0.8 08/18/2023 0844    GLU 84 08/18/2023 0844        Component Value Date/Time    CALCIUM 9.0 08/18/2023 0844    ALKPHOS 121 08/18/2023 0844    AST 25 08/18/2023 0844    ALT 40 08/18/2023 0844    BILITOT 0.6 08/18/2023 0844          Magnesium   Date Value Ref Range Status   06/28/2023 2.1 1.6 - 2.6 mg/dL Final     Lab Results   Component Value Date    WBC 6.22 08/18/2023    HGB 11.0 (L) 08/18/2023    HCT 35.5 (L) 08/18/2023     08/18/2023     Lab Results   Component Value Date    INR 0.9 08/18/2023     BNP   Date Value Ref Range Status   06/28/2023 58 0 - 99 pg/mL Final     Comment:     Values of less than 100 pg/ml are consistent with non-CHF populations.   01/10/2023 235 (H) 0 - 99 pg/mL Final     Comment:     Values of less than 100 pg/ml are consistent with non-CHF populations.     Assessment:      1. WHO group 1 pulmonary arterial hypertension    2. Severe tricuspid regurgitation    3. High risk medication use - SC Remodulin    4. BMI 32.0-32.9,adult      Plan:   WHO group 1 PH with WHO FC II symptoms on a  stable PH regimen (Remodulin 40ng/kg/min since 5/12/2023) and Opsumit 10 mg daily since 2/13/2023.   Patient is enrolled in the CERENO study. This is her Visit 7. She is tolerating her study drug   Recommend 2 gram sodium restriction and 1500cc fluid restriction.  Encourage physical activity with graded exercise program.  Requested patient to weigh themselves daily, and to notify us if their weight increases by more than 3 lbs in 1 day or 5 lbs in  1 week.   RTC on 12/11/2023 for visit 9     Maged Rolon MD

## 2023-11-14 NOTE — H&P (VIEW-ONLY)
Subjective:     HPI:  Ms. Sarmiento is a very pleasant 33 y.o. year old white female with WHO group 1 PH on a stable dose of SQ Remodulin (40 ng/kg/min) and Opsumit 10 mg daily who comes for a follow-up and screening for the Cereno study. She reports WHO FC II symptoms.  She is here for her visit 7 of The Cereno study (CS1-003). She reports no issues with her PH regimen.     Past Medical History:   Diagnosis Date    BMI 32.0-32.9,adult 11/9/2022    Migraine headache 2000    Ongoing    Morbidly obese     Unknown start date; lost over 80 pounds between 1096-0870    Neuropathy 2019    Bilateral feet; ongoing    Ovarian cyst 2017    Bilaterally; ongoing    Pulmonary hypertension 12/01/2022    Ongoing    Severe tricuspid regurgitation 11/09/2022    Resolved 6/28/2023 per echo    Thyroid disease 2013    Ongoing (S/P left partial thyroidectomy)    Vaginal delivery 12/11/2012     Past Surgical History:   Procedure Laterality Date    INSERTION, WIRELESS SENSOR, FOR PULMONARY ARTERIAL PRESSURE MONITORING Right 8/18/2023    Procedure: Insertion, Wireless Sensor, For Pulmonary Arterial Pressure Monitoring;  Surgeon: Maged Rolon MD;  Location: [facility] CATH LAB;  Service: Cardiology;  Laterality: Right;    LEFT HEART CATHETERIZATION Left 12/01/2022    Procedure: Left heart cath;  Surgeon: Kee Magana MD;  Location: [facility] CATH;  Service: Cardiovascular;  Laterality: Left;    RIGHT HEART CATHETERIZATION Right 12/01/2022    Procedure: INSERTION, CATHETER, RIGHT HEART;  Surgeon: Kee Magana MD;  Location: [facility] CATH;  Service: Cardiovascular;  Laterality: Right;  + [place]    RIGHT HEART CATHETERIZATION Right 02/01/2023    Procedure: INSERTION, CATHETER, RIGHT HEART;  Surgeon: Danny Clark MD;  Location: [facility] CATH LAB;  Service: Cardiology;  Laterality: Right;  with nitric oxide study    RIGHT HEART CATHETERIZATION Right 8/18/2023    Procedure: INSERTION, CATHETER, RIGHT HEART;  Surgeon: Maged Rolon MD;   Location: [facility] CATH LAB;  Service: Cardiology;  Laterality: Right;    SELECTIVE UNILATERAL PULMONARY ANGIOGRAPHY  2023    [facility];  Surgeon: Maged Rolon MD;  Location: [facility] CATH LAB;  Service: Cardiology;;    THYROIDECTOMY, PARTIAL Left      OB History          1    Para        Term                AB        Living   1         SAB        IAB        Ectopic        Multiple        Live Births                   Review of Systems   Constitutional: Negative. Negative for chills, decreased appetite, diaphoresis, fever, malaise/fatigue, night sweats, weight gain and weight loss.   Eyes: Negative.    Cardiovascular:  Positive for dyspnea on exertion. Negative for chest pain, claudication, cyanosis, irregular heartbeat, leg swelling, near-syncope, orthopnea, palpitations, paroxysmal nocturnal dyspnea and syncope.   Respiratory:  Negative for cough, hemoptysis and shortness of breath.    Endocrine: Negative.    Hematologic/Lymphatic: Negative.    Skin:  Negative for color change, dry skin and nail changes.   Musculoskeletal: Negative.    Gastrointestinal: Negative.    Genitourinary: Negative.    Neurological:  Negative for weakness.       Objective:   Blood pressure 113/61, pulse 77, height 5' (1.524 m), weight 76.7 kg (169 lb 1.5 oz), SpO2 100 %.body mass index is 33.02 kg/m².  Physical Exam  Vitals and nursing note reviewed.   Constitutional:       Appearance: She is well-developed.   HENT:      Head: Normocephalic.   Eyes:      Pupils: Pupils are equal, round, and reactive to light.   Neck:      Vascular: No JVD.   Cardiovascular:      Rate and Rhythm: Normal rate and regular rhythm.      Chest Wall: PMI is not displaced.      Pulses: Intact distal pulses.      Heart sounds: Normal heart sounds. No murmur heard.     No friction rub. No gallop.      Comments: Loud S2  Pulmonary:      Effort: Pulmonary effort is normal.      Breath sounds: Normal breath  sounds. No wheezing or rales.   Abdominal:      General: Bowel sounds are normal.      Palpations: Abdomen is soft.   Musculoskeletal:      Cervical back: Neck supple.   Neurological:      Mental Status: She is alert and oriented to person, place, and time.       Labs:    Chemistry        Component Value Date/Time     08/18/2023 0844    K 3.3 (L) 08/18/2023 0844     08/18/2023 0844    CO2 24 08/18/2023 0844    BUN 12 08/18/2023 0844    CREATININE 0.8 08/18/2023 0844    GLU 84 08/18/2023 0844        Component Value Date/Time    CALCIUM 9.0 08/18/2023 0844    ALKPHOS 121 08/18/2023 0844    AST 25 08/18/2023 0844    ALT 40 08/18/2023 0844    BILITOT 0.6 08/18/2023 0844          Magnesium   Date Value Ref Range Status   06/28/2023 2.1 1.6 - 2.6 mg/dL Final     Lab Results   Component Value Date    WBC 6.22 08/18/2023    HGB 11.0 (L) 08/18/2023    HCT 35.5 (L) 08/18/2023     08/18/2023     Lab Results   Component Value Date    INR 0.9 08/18/2023     BNP   Date Value Ref Range Status   06/28/2023 58 0 - 99 pg/mL Final     Comment:     Values of less than 100 pg/ml are consistent with non-CHF populations.   01/10/2023 235 (H) 0 - 99 pg/mL Final     Comment:     Values of less than 100 pg/ml are consistent with non-CHF populations.     Assessment:      1. WHO group 1 pulmonary arterial hypertension    2. Severe tricuspid regurgitation    3. High risk medication use - SC Remodulin    4. BMI 32.0-32.9,adult      Plan:   WHO group 1 PH with WHO FC II symptoms on a  stable PH regimen (Remodulin 40ng/kg/min since 5/12/2023) and Opsumit 10 mg daily since 2/13/2023.   Patient is enrolled in the CERENO study. This is her Visit 7. She is tolerating her study drug   Recommend 2 gram sodium restriction and 1500cc fluid restriction.  Encourage physical activity with graded exercise program.  Requested patient to weigh themselves daily, and to notify us if their weight increases by more than 3 lbs in 1 day or 5 lbs in  1 week.   RTC on 12/11/2023 for visit 9     Maged Rolon MD

## 2023-11-14 NOTE — PROGRESS NOTES
Study: CS1-003  Sponsor: [employer]  Visit: Visit 7 (V7)  Date of Visit: 11/14/2023     Patient wishes to continue in study: Yes  All study protocol required CRFs completed: Yes     Mrs. Sarmiento is here for the CS1-003 Visit 7. EKG performed and fasting send out labs (hematology, chemistry, eGFR, BNP and NT-proBNP, ST2 and PATEL-1, and urinalysis) collected. Urine pregnancy test negative. Processing and shipping performed per protocol by [facility] staff ([facility]). V5 PK and biomarker backup samples to be shipped with V7 frozen samples per lab manual instructions. INR and aPTT not applicable per sponsor (no anticoagulation/bleeding history noted). Vitals signs (supine and sitting) including weight performed. 6 minute walk test with vital signs and Ayala Scale assessment before, immediately following walk, and 3 minutes after walk performed per protocol. Physical exam, RRS 2.0, REVEAL 2 Lite score, and WHO FC assessment performed per Dr. Rolon. Completed PAH-SYMPACT Day 1-6 questionnaires from 11/8/2023-11/13/2023 received from patient and next set of PAH-SYMPACT Day 1-6 questionnaires distributed to patient with instructions for completion prior to V9. Patient to begin PAH-SYMPACT Day 1 on 12/6/2023 and continue questionnaires daily until V9 completion 12/12/2023. PAH-SYMPACT Day 7 and MLHFQ questionnaires completed by patient during visit per protocol. Current medications reviewed and confirmed with patient with no changes noted since last visit. Patient denies any symptoms/side effects or adverse events since last study visit and continues to report overall tolerability of CS1-003 960 mg daily dosing. No issues or complaints at goal dose of 960 mg daily. Actigraphy device uploaded to [employer], compliance confirmed, and study milestone entered. CardioMEMS PAP readings reviewed with Dr. Rolon. Compliance confirmed (100%) and PAP readings/waveforms acceptable per Dr. Rolon. Study drug accountability  performed with 168 capsules actually dosed/168 capsules expected dosed resulting in 100% treatment compliance (compliant per protocol). Study accountability entered and new bottles of study drug assigned via [employer]. Study drug ordered per Dr. Rolon and dispensed by pharmacist [facility]a [facility] Dosing instructions reviewed with patient. Patient instructed to continue 960 mg daily dosing of 2 (320 mg) capsules in the morning between 7:00-9:00 AM and 4 (640 mg) capsules in the evening between 7:00-9:00 PM. Patient instructed to call with any adverse reactions/events or suspected side effects. Patient and patient's  verbalized complete understanding of all study instructions and education provided. Patient to be contacted on 11/28/2023 for Visit 8 (V8) call. Next clinic visit is Visit 9 (V9) scheduled 12/11/2023-12/12/2023. Patient and patient's  in agreement with plan and deny any questions or concerns. V7 visit payment dispensed via [employer]. Will follow.

## 2023-11-25 NOTE — TELEPHONE ENCOUNTER
Faxed received                  If you are a smoker, it is important for your health to stop smoking. Please be aware that second hand smoke is also harmful.

## 2023-11-28 ENCOUNTER — RESEARCH ENCOUNTER (OUTPATIENT)
Dept: RESEARCH | Facility: HOSPITAL | Age: 33
End: 2023-11-28
Payer: COMMERCIAL

## 2023-11-28 NOTE — PROGRESS NOTES
Study: CS1-003  Sponsor: [employer]  Visit: Visit 8 (V8) Telephone Call  Date of Visit: 11/28/2023     Patient wishes to continue in study: Yes  All study protocol required CRFs completed: Yes     Mrs. Sarmiento was contacted for the 1-003 Visit 8 telephone call. Patient continues to report WHO FC II symptoms upon assessment. Patient denies any adverse events or symptoms/side effects since last visit and reports tolerating the study medication well. No issues or complaints at goal dose of 960 mg daily. Patient to continue taking 2 (320 mg) capsules in the morning between 7:00-9:00 AM and 4 (640 mg) capsules in the evening between 7:00-9:00 PM. Patient instructed to call with any adverse reactions or suspected side effects. All concomitant medications reviewed with patient and no changes noted since last visit. Actigraphy device wear compliance viewed and study milestone entered. Encouraged patient to continue wearing actigraphy device at all times. CardioMEMS PAP readings reviewed. Compliance confirmed (100%) and PAP readings/waveform quality remains acceptable. Next visit (V9) to occur 12/11/2023 beginning at 6:30 AM and 12/12/2023 beginning at 7:30 AM. Patient verbalized complete understanding and is in agreement with plan. No questions or concerns at this time. V9 visit payment dispensed via [employer]. Will follow.

## 2023-12-07 DIAGNOSIS — Z30.09 ENCOUNTER FOR OTHER GENERAL COUNSELING AND ADVICE ON CONTRACEPTION: ICD-10-CM

## 2023-12-07 RX ORDER — NORGESTIMATE AND ETHINYL ESTRADIOL 7DAYSX3 28
1 KIT ORAL
Qty: 84 TABLET | Refills: 3 | Status: SHIPPED | OUTPATIENT
Start: 2023-12-07 | End: 2024-03-05

## 2023-12-11 ENCOUNTER — RESEARCH ENCOUNTER (OUTPATIENT)
Dept: RESEARCH | Facility: HOSPITAL | Age: 33
End: 2023-12-11
Payer: COMMERCIAL

## 2023-12-12 ENCOUNTER — HOSPITAL ENCOUNTER (OUTPATIENT)
Dept: RADIOLOGY | Facility: HOSPITAL | Age: 33
Discharge: HOME | End: 2023-12-12
Attending: INTERNAL MEDICINE
Payer: COMMERCIAL

## 2023-12-12 ENCOUNTER — RESEARCH ENCOUNTER (OUTPATIENT)
Dept: RESEARCH | Facility: HOSPITAL | Age: 33
End: 2023-12-12
Payer: COMMERCIAL

## 2023-12-12 ENCOUNTER — HOSPITAL ENCOUNTER (OUTPATIENT)
Dept: CARDIOLOGY | Facility: CLINIC | Age: 33
Discharge: HOME | End: 2023-12-12
Payer: COMMERCIAL

## 2023-12-12 ENCOUNTER — HOSPITAL ENCOUNTER (OUTPATIENT)
Dept: CARDIOLOGY | Facility: HOSPITAL | Age: 33
Discharge: HOME | End: 2023-12-12
Attending: INTERNAL MEDICINE
Payer: COMMERCIAL

## 2023-12-12 ENCOUNTER — HOSPITAL ENCOUNTER (OUTPATIENT)
Facility: HOSPITAL | Age: 33
Discharge: HOME | End: 2023-12-12
Attending: INTERNAL MEDICINE | Admitting: INTERNAL MEDICINE
Payer: COMMERCIAL

## 2023-12-12 VITALS
WEIGHT: 165 LBS | RESPIRATION RATE: 18 BRPM | HEART RATE: 70 BPM | HEIGHT: 60 IN | SYSTOLIC BLOOD PRESSURE: 120 MMHG | DIASTOLIC BLOOD PRESSURE: 60 MMHG | BODY MASS INDEX: 32.39 KG/M2 | TEMPERATURE: 98 F | OXYGEN SATURATION: 96 %

## 2023-12-12 VITALS
WEIGHT: 166 LBS | RESPIRATION RATE: 15 BRPM | OXYGEN SATURATION: 96 % | BODY MASS INDEX: 32.42 KG/M2 | DIASTOLIC BLOOD PRESSURE: 65 MMHG | HEART RATE: 77 BPM | TEMPERATURE: 97 F | SYSTOLIC BLOOD PRESSURE: 100 MMHG

## 2023-12-12 VITALS
DIASTOLIC BLOOD PRESSURE: 60 MMHG | BODY MASS INDEX: 32.39 KG/M2 | HEART RATE: 78 BPM | WEIGHT: 165 LBS | HEIGHT: 60 IN | SYSTOLIC BLOOD PRESSURE: 108 MMHG

## 2023-12-12 DIAGNOSIS — I27.20 PULMONARY HTN: ICD-10-CM

## 2023-12-12 DIAGNOSIS — Z00.6 EXAMINATION OF PARTICIPANT IN CLINICAL TRIAL: ICD-10-CM

## 2023-12-12 DIAGNOSIS — I27.20 PULMONARY HYPERTENSION: ICD-10-CM

## 2023-12-12 DIAGNOSIS — I27.21 WHO GROUP 1 PULMONARY ARTERIAL HYPERTENSION: ICD-10-CM

## 2023-12-12 DIAGNOSIS — I27.21 WHO GROUP 1 PULMONARY ARTERIAL HYPERTENSION: Primary | ICD-10-CM

## 2023-12-12 LAB
ASCENDING AORTA: 2.69 CM
AV INDEX (PROSTH): 0.94
AV MEAN GRADIENT: 7 MMHG
AV PEAK GRADIENT: 13 MMHG
AV VALVE AREA BY VELOCITY RATIO: 2.76 CM²
AV VALVE AREA: 2.86 CM²
AV VELOCITY RATIO: 0.91
BSA FOR ECHO PROCEDURE: 1.78 M2
CV ECHO LV RWT: 0.27 CM
DOP CALC AO PEAK VEL: 1.79 M/S
DOP CALC AO VTI: 33.16 CM
DOP CALC LVOT AREA: 3 CM2
DOP CALC LVOT DIAMETER: 1.97 CM
DOP CALC LVOT PEAK VEL: 1.62 M/S
DOP CALC LVOT STROKE VOLUME: 94.9 CM3
DOP CALCLVOT PEAK VEL VTI: 31.15 CM
E WAVE DECELERATION TIME: 198.95 MSEC
E/A RATIO: 1.13
E/E' RATIO: 7.83 M/S
ECHO LV POSTERIOR WALL: 0.7 CM (ref 0.6–1.1)
FRACTIONAL SHORTENING: 41 % (ref 28–44)
INTERVENTRICULAR SEPTUM: 0.75 CM (ref 0.6–1.1)
IVC DIAMETER: 1.2 CM
IVRT: 78.02 MSEC
LA MAJOR: 5.04 CM
LA MINOR: 4.46 CM
LA WIDTH: 3.41 CM
LEFT ATRIUM SIZE: 3.47 CM
LEFT ATRIUM VOLUME INDEX MOD: 23.9 ML/M2
LEFT ATRIUM VOLUME INDEX: 27.7 ML/M2
LEFT ATRIUM VOLUME MOD: 41.07 CM3
LEFT ATRIUM VOLUME: 47.6 CM3
LEFT INTERNAL DIMENSION IN SYSTOLE: 3.07 CM (ref 2.1–4)
LEFT VENTRICLE DIASTOLIC VOLUME INDEX: 73.93 ML/M2
LEFT VENTRICLE DIASTOLIC VOLUME: 127.16 ML
LEFT VENTRICLE MASS INDEX: 74 G/M2
LEFT VENTRICLE SYSTOLIC VOLUME INDEX: 21.6 ML/M2
LEFT VENTRICLE SYSTOLIC VOLUME: 37.13 ML
LEFT VENTRICULAR INTERNAL DIMENSION IN DIASTOLE: 5.16 CM (ref 3.5–6)
LEFT VENTRICULAR MASS: 126.58 G
LV LATERAL E/E' RATIO: 5.88 M/S
LV SEPTAL E/E' RATIO: 11.75 M/S
MAIN PULMONARY ARTERY: 4.1 CM
MV A" WAVE DURATION": 4.95 MSEC
MV PEAK A VEL: 0.83 M/S
MV PEAK E VEL: 0.94 M/S
MV STENOSIS PRESSURE HALF TIME: 57.7 MS
MV VALVE AREA P 1/2 METHOD: 3.81 CM2
PISA TR MAX VEL: 3.79 M/S
PULM VEIN S/D RATIO: 1.03
PV PEAK D VEL: 0.59 M/S
PV PEAK S VEL: 0.61 M/S
RA MAJOR: 5.16 CM
RA WIDTH: 4.2 CM
RIGHT VENTRICULAR END-DIASTOLIC DIMENSION: 4.75 CM
SINUS: 2.52 CM
STJ: 1.91 CM
TDI LATERAL: 0.16 M/S
TDI SEPTAL: 0.08 M/S
TDI: 0.12 M/S
TR MAX PG: 57 MMHG
TRICUSPID ANNULAR PLANE SYSTOLIC EXCURSION: 1.93 CM
Z-SCORE OF LEFT VENTRICULAR DIMENSION IN END DIASTOLE: 0.78
Z-SCORE OF LEFT VENTRICULAR DIMENSION IN END SYSTOLE: 0.31
Z-SCORE OF MAIN PULMONARY ARTERY: 3.76

## 2023-12-12 PROCEDURE — 75561 CARDIAC MRI FOR MORPH W/DYE: CPT | Mod: 26,,, | Performed by: INTERNAL MEDICINE

## 2023-12-12 PROCEDURE — C1751 CATH, INF, PER/CENT/MIDLINE: HCPCS | Performed by: INTERNAL MEDICINE

## 2023-12-12 PROCEDURE — 93010 EKG 12-LEAD: ICD-10-PCS | Mod: S$GLB,,, | Performed by: INTERNAL MEDICINE

## 2023-12-12 PROCEDURE — 93306 TTE W/DOPPLER COMPLETE: CPT

## 2023-12-12 PROCEDURE — 75561 CV CARDIAC MRI MORPHOLOGY (CUPID ONLY): ICD-10-PCS | Mod: 26,,, | Performed by: INTERNAL MEDICINE

## 2023-12-12 PROCEDURE — 75557 MRI CARDIAC WO CONTRAST: ICD-10-PCS | Mod: 26,,, | Performed by: RADIOLOGY

## 2023-12-12 PROCEDURE — 75557 CARDIAC MRI FOR MORPH: CPT | Mod: 26,,, | Performed by: RADIOLOGY

## 2023-12-12 PROCEDURE — 93010 ELECTROCARDIOGRAM REPORT: CPT | Mod: S$GLB,,, | Performed by: INTERNAL MEDICINE

## 2023-12-12 PROCEDURE — 93451 PR RIGHT HEART CATH O2 SATURATION & CARDIAC OUTPUT: ICD-10-PCS | Mod: 26,,, | Performed by: INTERNAL MEDICINE

## 2023-12-12 PROCEDURE — 93451 RIGHT HEART CATH: CPT | Mod: 26,,, | Performed by: INTERNAL MEDICINE

## 2023-12-12 PROCEDURE — 93451 RIGHT HEART CATH: CPT | Performed by: INTERNAL MEDICINE

## 2023-12-12 PROCEDURE — 75561 CARDIAC MRI FOR MORPH W/DYE: CPT | Mod: TC

## 2023-12-12 PROCEDURE — 93005 ELECTROCARDIOGRAM TRACING: CPT | Mod: S$GLB,,, | Performed by: INTERNAL MEDICINE

## 2023-12-12 PROCEDURE — 75557 CARDIAC MRI FOR MORPH: CPT | Mod: TC

## 2023-12-12 PROCEDURE — 25000003 PHARM REV CODE 250: Performed by: INTERNAL MEDICINE

## 2023-12-12 PROCEDURE — 93005 EKG 12-LEAD: ICD-10-PCS | Mod: S$GLB,,, | Performed by: INTERNAL MEDICINE

## 2023-12-12 PROCEDURE — C1894 INTRO/SHEATH, NON-LASER: HCPCS | Performed by: INTERNAL MEDICINE

## 2023-12-12 RX ORDER — LIDOCAINE HYDROCHLORIDE 20 MG/ML
INJECTION, SOLUTION INFILTRATION; PERINEURAL
Status: DISCONTINUED | OUTPATIENT
Start: 2023-12-12 | End: 2023-12-12 | Stop reason: HOSPADM

## 2023-12-12 NOTE — DISCHARGE SUMMARY
[facility]  Discharge Note  Short Stay    Procedure(s) (LRB):  INSERTION, CATHETER, RIGHT HEART (Right)      OUTCOME: Patient tolerated treatment/procedure well without complication and is now ready for discharge.         Medication List        ASK your doctor about these medications      acetaminophen 500 MG tablet  Commonly known as: TYLENOL     aspirin 81 MG EC tablet  Commonly known as: ECOTRIN  Take 1 tablet (81 mg total) by mouth once daily.     furosemide 40 MG tablet  Commonly known as: LASIX  Take 1 tablet (40 mg total) by mouth once daily.     INV cs1 160 160 mg capsule  Commonly known as: INV cs1 160  Take 2 capsules every morning and 4 capsules every evening, as directed. For investigational use only. Protocol CS1-003. Subject#[id number]     levothyroxine 175 MCG tablet  Commonly known as: SYNTHROID, LEVOTHROID     macitentan 10 mg Tab     norgestimate-ethinyl estradioL 0.18/0.215/0.25 mg-35 mcg (28) tablet  Commonly known as: ORTHO TRI-CYCLEN,TRI-SPRINTEC  TAKE 1 TABLET BY MOUTH EVERY DAY     pregabalin 50 MG capsule  Commonly known as: LYRICA  Take 2 capsules (100 mg total) by mouth every evening.     spironolactone 25 MG tablet  Commonly known as: ALDACTONE  Take 1 tablet (25 mg total) by mouth once daily.     treprostinil 2.5 mg/mL Soln  Commonly known as: REMODULIN              DISPOSITION: Home or Self Care    FINAL DIAGNOSIS:  WHO group1  PH      FOLLOWUP: In clinic    DISCHARGE INSTRUCTIONS:  No discharge procedures on file.     Cardiac diet    Activity as tolerated    Maged Rolon MD

## 2023-12-12 NOTE — Clinical Note
The right radial and right chest was prepped. The site was prepped with ChloraPrep. The patient was draped.

## 2023-12-12 NOTE — NURSING
Discharge instructions and medlist given.  Patient and spouse verbalized understanding.  Seen by Dr Rolon.  Denies need for escort with wheelchair to next appointment.  Off unit walking with spouse.

## 2023-12-12 NOTE — Clinical Note
The PA catheter was repositioned to the main pulmonary artery. Hemodynamics were performed. O2 saturation was measured at 65%. CO= 5.00  CI= 2.91

## 2023-12-12 NOTE — PROGRESS NOTES
Subjective:          HPI:  Ms. Sarmiento is a very pleasant 33 y.o. year old white female with WHO group 1 PH on a stable dose of SQ Remodulin (40 ng/kg/min) and Opsumit 10 mg daily who comes for a follow-up (Visit 9) for the Cereno study. She reports WHO FC II symptoms.  She is here for her RHC.           Past Medical History:   Diagnosis Date    BMI 32.0-32.9,adult 11/9/2022    Migraine headache 2000    Ongoing    Morbidly obese     Unknown start date; lost over 80 pounds between 7722-3828    Neuropathy 2019    Bilateral feet; ongoing    Ovarian cyst 2017    Bilaterally; ongoing    Pulmonary hypertension 12/01/2022    Ongoing    Severe tricuspid regurgitation 11/09/2022    Resolved 6/28/2023 per echo    Thyroid disease 2013    Ongoing (S/P left partial thyroidectomy)    Vaginal delivery 12/11/2012     Past Surgical History:   Procedure Laterality Date    INSERTION, WIRELESS SENSOR, FOR PULMONARY ARTERIAL PRESSURE MONITORING Right 8/18/2023    Procedure: Insertion, Wireless Sensor, For Pulmonary Arterial Pressure Monitoring;  Surgeon: Maged Rolon MD;  Location: [facility] CATH LAB;  Service: Cardiology;  Laterality: Right;    LEFT HEART CATHETERIZATION Left 12/01/2022    Procedure: Left heart cath;  Surgeon: Kee Magana MD;  Location: [facility] CATH;  Service: Cardiovascular;  Laterality: Left;    RIGHT HEART CATHETERIZATION Right 12/01/2022    Procedure: INSERTION, CATHETER, RIGHT HEART;  Surgeon: Kee Magana MD;  Location: [facility] CATH;  Service: Cardiovascular;  Laterality: Right;  + [place]    RIGHT HEART CATHETERIZATION Right 02/01/2023    Procedure: INSERTION, CATHETER, RIGHT HEART;  Surgeon: Danny Clark MD;  Location: [facility] CATH LAB;  Service: Cardiology;  Laterality: Right;  with nitric oxide study    RIGHT HEART CATHETERIZATION Right 8/18/2023    Procedure: INSERTION, CATHETER, RIGHT HEART;  Surgeon: Maged Rolon MD;  Location: [facility] CATH LAB;  Service: Cardiology;  Laterality: Right;    SELECTIVE  UNILATERAL PULMONARY ANGIOGRAPHY  8/18/2023    Procedure: [facility] angiography - unilateral;  Surgeon: Maged Rolon MD;  Location: [facility] CATH LAB;  Service: Cardiology;;    THYROIDECTOMY, PARTIAL Left 2013         Other significant clinical information:  Review of patient's allergies indicates:   Allergen Reactions    Amoxicillin Other (See Comments)     Weak, sick, jaw tightens    Penicillins      Weak, sick, jaw tightens     Current Facility-Administered Medications   Medication Dose Route Frequency Provider Last Rate Last Admin    LIDOcaine HCL 20 mg/ml (2%) injection    PRN Maged Rolon MD   20 mL at 12/12/23 1301     Review of Systems   Constitutional: Negative. Negative for chills, decreased appetite, diaphoresis, fever, malaise/fatigue, night sweats, weight gain and weight loss.   Eyes: Negative.    Cardiovascular:  Positive for dyspnea on exertion. Negative for chest pain, claudication, cyanosis, irregular heartbeat, leg swelling, near-syncope, orthopnea, palpitations, paroxysmal nocturnal dyspnea and syncope.   Respiratory:  Negative for cough, hemoptysis and shortness of breath.    Endocrine: Negative.    Hematologic/Lymphatic: Negative.    Skin:  Negative for color change, dry skin and nail changes.   Musculoskeletal: Negative.    Gastrointestinal: Negative.    Genitourinary: Negative.    Neurological:  Negative for weakness.         Objective:   Blood pressure 113/61, pulse 77, height 5' (1.524 m), weight 76.7 kg (169 lb 1.5 oz), SpO2 100 %.body mass index is 33.02 kg/m².  Physical Exam  Vitals and nursing note reviewed.   Constitutional:       Appearance: She is well-developed.   HENT:      Head: Normocephalic.   Eyes:      Pupils: Pupils are equal, round, and reactive to light.   Neck:      Vascular: No JVD.   Cardiovascular:      Rate and Rhythm: Normal rate and regular rhythm.      Chest Wall: PMI is not displaced.      Pulses: Intact distal pulses.      Heart sounds: Normal heart sounds. No  murmur heard.     No friction rub. No gallop.      Comments: Loud S2  Pulmonary:      Effort: Pulmonary effort is normal.      Breath sounds: Normal breath sounds. No wheezing or rales.   Abdominal:      General: Bowel sounds are normal.      Palpations: Abdomen is soft.   Musculoskeletal:      Cervical back: Neck supple.   Neurological:      Mental Status: She is alert and oriented to person, place, and time.      Assessment:   WHO group 1 PH with WHO FC II symptoms on a  stable PH regimen (Remodulin 40ng/kg/min since 5/12/2023) and Opsumit 10 mg daily since 2/13/2023.   Patient is enrolled in the CERENO study. This is her Visit 9. She is tolerating her study drug. She underwent a RHC today.  Recommend 2 gram sodium restriction and 1500cc fluid restriction.  Encourage physical activity with graded exercise program.  Requested patient to weigh themselves daily, and to notify us if their weight increases by more than 3 lbs in 1 day or 5 lbs in 1 week.      Maged Rolon MD

## 2023-12-12 NOTE — DISCHARGE INSTRUCTIONS

## 2023-12-12 NOTE — INTERVAL H&P NOTE
Patient seen and examined. No interval change since last H&P. Here for a RHC to assess her hemodynamics as part of the CERENO study,   Access via the right IJ  7 Fr venous sheath  Risks and benefits of the procedure were explained to the patient. All questions were answered.  Consents were signed and in the chart.    Maged Rolon MD

## 2023-12-12 NOTE — BRIEF OP NOTE
[facility] Lab  Brief Operative Note  Cardiology    SUMMARY     Surgery Date: 12/12/2023     Surgeon(s) and Role:     * Maged Rolon MD - Primary    Assisting Surgeon: None    Pre-op Diagnosis:  Pulmonary hypertension [I27.20]  Examination of participant in clinical trial [Z00.6]    Post-op Diagnosis: Post-Op Diagnosis Codes:     * Pulmonary hypertension [I27.20]     * Examination of participant in clinical trial [Z00.6]    Procedure Performed: RHC    Description of the findings of the procedure: RHC performed via the right IJ. Patient tolerated the procedure well. Normal left and right side filling pressures (RA=8 mm of Hg, PCWP=13 mm of Hg). Severe pulmonary HTN  (PA=93/26 mm of Hg, PA mean=51 mm of Hg, TPG=38, PVR=8). Normal cardiac index and output  (CI=2.9 L/min/m2, CO=5 L/min)    Estimated Blood Loss: < 10 cc    Plan:   - Routine post-cath care    Maged Rolon MD

## 2023-12-13 ENCOUNTER — PATIENT MESSAGE (OUTPATIENT)
Dept: TRANSPLANT | Facility: CLINIC | Age: 33
End: 2023-12-13
Payer: COMMERCIAL

## 2023-12-13 NOTE — PROGRESS NOTES
Study: CS1-003  Sponsor: [employer]  Visit: Visit 9 (V9) [Day 1 of 2]  Date of Visit: 12/11/2023     Patient wishes to continue in study: Yes  All study protocol required CRFs completed: Yes     Mrs. Sarmiento is here for the 1-003 Visit 9 (Day 1- 12 hour PK sampling visit). IV site (22 gauge to left forearm) insertion and all sample collection performed by Fabienne Meadows RN in Infusion [facility]. Pre-dose PK collected prior to patient's morning dose of study drug. Study drug taken at 0702 following pre-dose PK lab collection. PK samples collected following study drug administration at 2, 4, 6, 8, 10, and 12 hours post-dose per protocol. Processing, shipping, and PK storage performed per protocol by [facility] staff (RM Carpenter). Completed PAH-SYMPACT Day 1-6 questionnaires from 12/6/2023-12/11/2023 received from patient. Actigraphy watch and all accessories returned by patient. Actigraphy device uploaded to [employer], compliance confirmed, and study milestone entered. Actigraphy watch unassigned from [employer]. Patient scheduled to return tomorrow 12/12/2023 beginning at 7:30 AM for Visit 9 (V9) Day 2. Patient and patient's  in agreement with plan and deny any questions or concerns. Will follow.

## 2023-12-13 NOTE — PROGRESS NOTES
Study: CS1-003  Sponsor: [employer]  Visit: Visit 9 (V9) [Day 2 of 2]  Date of Visit: 12/12/2023     Patient wishes to continue in study: Yes  All study protocol required CRFs completed: Yes     Mrs. Sarmiento is here for the CS1-003 Visit 9 (Day 2). EKG performed and fasting send out labs (hematology, chemistry, eGFR, BNP and NT-proBNP, ST2 and PATEL-1, biomarkers, and urinalysis) collected. Urine pregnancy test negative. Processing, shipping, and biomarker storage performed per protocol by [facility] staff (Estelita Carrasco, [facility]). INR and aPTT not applicable per sponsor (no anticoagulation/bleeding history noted), however, INR obtained per RHC pre-procedure standard of care and recorded accordingly. Vitals signs (supine and sitting) including weight performed. 6 minute walk test with vital signs and Ayala Scale assessment before, immediately following walk, and 3 minutes after walk performed per protocol. Physical exam, RRS 2.0, REVEAL 2 Lite score, and WHO FC assessment performed per Dr. Rolon. CardioMEMS PAP readings taken prior to cMRI by [facility]. cMRI (per cMRI tech Levi Hankins) and echocardiogram (per sonographer Wong Bowling) performed per protocol. RHC performed with Castell-Junior measurements obtained per Dr. Rolon. PAH-SYMPACT Day 7 and MLHFQ questionnaires completed by patient during visit per protocol. Current medications reviewed and confirmed with patient with no changes noted since last visit. Patient denies any symptoms/side effects or adverse events since last study visit and reported overall tolerability of CS1-003 960 mg daily dosing. CardioMEMS PAP readings reviewed with Dr. Rolon. Compliance confirmed (13 of 14 days = 93%) and PAP readings/waveforms acceptable per Dr. Rolon. Patient instructed to continue daily readings following study completion. Study drug accountability performed with 168 capsules actually dosed/168 capsules expected dosed resulting in 100% treatment compliance (compliant per protocol).  Study drug accountability entered into [employer]. Patient confirmed that final dose of study drug was taken on the morning of 12/12/2023. Study drug treatment period completed per protocol. Patient to be contacted on 12/26/2023 for Visit 10 (V10) final follow-up call. Patient in agreement with plan and denies any questions or concerns. V9 visit payment dispensed via [employer]. Will follow.

## 2023-12-14 ENCOUNTER — TELEPHONE (OUTPATIENT)
Dept: RESEARCH | Facility: HOSPITAL | Age: 33
End: 2023-12-14
Payer: COMMERCIAL

## 2023-12-19 ENCOUNTER — PATIENT MESSAGE (OUTPATIENT)
Dept: RESEARCH | Facility: HOSPITAL | Age: 33
End: 2023-12-19
Payer: COMMERCIAL

## 2023-12-26 ENCOUNTER — RESEARCH ENCOUNTER (OUTPATIENT)
Dept: RESEARCH | Facility: HOSPITAL | Age: 33
End: 2023-12-26
Payer: COMMERCIAL

## 2023-12-26 NOTE — PROGRESS NOTES
Study: CS1-003  Sponsor: [employer]  Visit: Visit 10 (V10) Follow-Up Call  Date of Visit: 12/26/2023     Patient wishes to continue in study: Yes  All study protocol required CRFs completed: Yes     Mrs. Sarmiento was contacted for the 1-003 Visit 10 follow-up call. Patient denies any symptoms/side effects or adverse events since last study visit/completion of study drug on 12/12/2023. All concomitant medications reviewed and confirmed with patient with no changes noted since last visit. Patient notified that all study follow-up has been completed. No questions or concerns at this time. V10 visit payment dispensed via [employer]. Patient completed in Epic and [employer].

## 2024-01-01 ENCOUNTER — PATIENT MESSAGE (OUTPATIENT)
Dept: TRANSPLANT | Facility: CLINIC | Age: 34
End: 2024-01-01

## 2024-01-02 DIAGNOSIS — Z32.00 ENCOUNTER FOR PREGNANCY TEST, RESULT UNKNOWN: Primary | ICD-10-CM

## 2024-01-05 ENCOUNTER — PATIENT MESSAGE (OUTPATIENT)
Dept: TRANSPLANT | Facility: CLINIC | Age: 34
End: 2024-01-05

## 2024-01-17 ENCOUNTER — LAB VISIT (OUTPATIENT)
Dept: LAB | Facility: HOSPITAL | Age: 34
End: 2024-01-17
Attending: INTERNAL MEDICINE
Payer: COMMERCIAL

## 2024-01-17 DIAGNOSIS — Z32.00 ENCOUNTER FOR PREGNANCY TEST, RESULT UNKNOWN: ICD-10-CM

## 2024-01-17 LAB — HCG INTACT+B SERPL-ACNC: <1 MIU/ML

## 2024-01-17 PROCEDURE — 36415 COLL VENOUS BLD VENIPUNCTURE: CPT

## 2024-01-17 PROCEDURE — 84702 CHORIONIC GONADOTROPIN TEST: CPT

## 2024-01-22 ENCOUNTER — PATIENT MESSAGE (OUTPATIENT)
Dept: TRANSPLANT | Facility: CLINIC | Age: 34
End: 2024-01-22

## 2024-01-25 ENCOUNTER — LAB VISIT (OUTPATIENT)
Dept: LAB | Facility: HOSPITAL | Age: 34
End: 2024-01-25
Payer: COMMERCIAL

## 2024-01-25 DIAGNOSIS — Z79.899 POLYPHARMACY: ICD-10-CM

## 2024-01-25 DIAGNOSIS — R06.82 TACHYPNEA: ICD-10-CM

## 2024-01-25 LAB
ALBUMIN SERPL BCP-MCNC: 3.7 G/DL (ref 3.5–5.2)
ALP SERPL-CCNC: 145 U/L (ref 55–135)
ALT SERPL W/O P-5'-P-CCNC: 28 U/L (ref 10–44)
ANION GAP SERPL CALC-SCNC: 3 MMOL/L (ref 3–11)
AST SERPL-CCNC: 21 U/L (ref 10–40)
BASOPHILS # BLD AUTO: 0.06 K/UL (ref 0–0.2)
BASOPHILS NFR BLD: 0.7 % (ref 0–1.9)
BILIRUB SERPL-MCNC: 0.5 MG/DL (ref 0.1–1)
BNP SERPL-MCNC: 35 PG/ML (ref 0–99)
BUN SERPL-MCNC: 11 MG/DL (ref 6–20)
CALCIUM SERPL-MCNC: 8.7 MG/DL (ref 8.7–10.5)
CHLORIDE SERPL-SCNC: 106 MMOL/L (ref 95–110)
CO2 SERPL-SCNC: 27 MMOL/L (ref 23–29)
CREAT SERPL-MCNC: 0.9 MG/DL (ref 0.5–1.4)
DIFFERENTIAL METHOD BLD: ABNORMAL
EOSINOPHIL # BLD AUTO: 0.4 K/UL (ref 0–0.5)
EOSINOPHIL NFR BLD: 4.5 % (ref 0–8)
ERYTHROCYTE [DISTWIDTH] IN BLOOD BY AUTOMATED COUNT: 16.9 % (ref 11.5–14.5)
EST. GFR  (NO RACE VARIABLE): >60 ML/MIN/1.73 M^2
GLUCOSE SERPL-MCNC: 103 MG/DL (ref 70–110)
HCT VFR BLD AUTO: 34.5 % (ref 37–48.5)
HGB BLD-MCNC: 10 G/DL (ref 12–16)
IMM GRANULOCYTES # BLD AUTO: 0.11 K/UL (ref 0–0.04)
IMM GRANULOCYTES NFR BLD AUTO: 1.4 % (ref 0–0.5)
LYMPHOCYTES # BLD AUTO: 1.2 K/UL (ref 1–4.8)
LYMPHOCYTES NFR BLD: 14.5 % (ref 18–48)
MAGNESIUM SERPL-MCNC: 2.2 MG/DL (ref 1.6–2.6)
MCH RBC QN AUTO: 18.7 PG (ref 27–31)
MCHC RBC AUTO-ENTMCNC: 29 G/DL (ref 32–36)
MCV RBC AUTO: 65 FL (ref 82–98)
MONOCYTES # BLD AUTO: 0.7 K/UL (ref 0.3–1)
MONOCYTES NFR BLD: 8.4 % (ref 4–15)
NEUTROPHILS # BLD AUTO: 5.7 K/UL (ref 1.8–7.7)
NEUTROPHILS NFR BLD: 70.5 % (ref 38–73)
NRBC BLD-RTO: 0 /100 WBC
PLATELET # BLD AUTO: 296 K/UL (ref 150–450)
PMV BLD AUTO: 10.3 FL (ref 9.2–12.9)
POTASSIUM SERPL-SCNC: 4.2 MMOL/L (ref 3.5–5.1)
PROT SERPL-MCNC: 8.1 G/DL (ref 6–8.4)
RBC # BLD AUTO: 5.35 M/UL (ref 4–5.4)
SODIUM SERPL-SCNC: 136 MMOL/L (ref 136–145)
WBC # BLD AUTO: 8.06 K/UL (ref 3.9–12.7)

## 2024-01-25 PROCEDURE — 36415 COLL VENOUS BLD VENIPUNCTURE: CPT

## 2024-01-25 PROCEDURE — 80053 COMPREHEN METABOLIC PANEL: CPT

## 2024-01-25 PROCEDURE — 83735 ASSAY OF MAGNESIUM: CPT

## 2024-01-25 PROCEDURE — 83880 ASSAY OF NATRIURETIC PEPTIDE: CPT

## 2024-01-25 PROCEDURE — 85025 COMPLETE CBC W/AUTO DIFF WBC: CPT

## 2024-01-26 RX ORDER — FUROSEMIDE 40 MG/1
40 TABLET ORAL
Qty: 90 TABLET | Refills: 3 | OUTPATIENT
Start: 2024-01-26

## 2024-01-26 RX ORDER — SPIRONOLACTONE 25 MG/1
25 TABLET ORAL DAILY
Qty: 30 TABLET | Refills: 11 | Status: SHIPPED | OUTPATIENT
Start: 2024-01-26 | End: 2025-01-25

## 2024-01-26 RX ORDER — FUROSEMIDE 40 MG/1
40 TABLET ORAL DAILY
Qty: 30 TABLET | Refills: 11 | Status: SHIPPED | OUTPATIENT
Start: 2024-01-26 | End: 2025-01-25

## 2024-01-26 RX ORDER — SPIRONOLACTONE 25 MG/1
25 TABLET ORAL
Qty: 90 TABLET | Refills: 3 | OUTPATIENT
Start: 2024-01-26

## 2024-01-29 ENCOUNTER — OFFICE VISIT (OUTPATIENT)
Dept: TRANSPLANT | Facility: CLINIC | Age: 34
End: 2024-01-29
Payer: COMMERCIAL

## 2024-01-29 DIAGNOSIS — E07.9 THYROID DISEASE: ICD-10-CM

## 2024-01-29 DIAGNOSIS — E61.1 IRON DEFICIENCY: ICD-10-CM

## 2024-01-29 DIAGNOSIS — I27.21 WHO GROUP 1 PULMONARY ARTERIAL HYPERTENSION: Primary | ICD-10-CM

## 2024-01-29 DIAGNOSIS — Z79.899 HIGH RISK MEDICATION USE: ICD-10-CM

## 2024-01-29 DIAGNOSIS — N83.209 CYST OF OVARY, UNSPECIFIED LATERALITY: ICD-10-CM

## 2024-01-29 PROCEDURE — 99214 OFFICE O/P EST MOD 30 MIN: CPT | Mod: 95,,,

## 2024-01-29 NOTE — PATIENT INSTRUCTIONS
No medication changes today.     Will plan for addition of tadalafil.      Will draw Thyroid labs and iron studies. Have urged her to find a local PCP.     Needs referral to [facility]. Noted that we would want any procedures done at main campus.      Plan for follow-up in 2-3 months with coordination with OBGYN appointment.     Recommend 2 gram sodium restriction and 1500cc fluid restriction.  Encourage physical activity with graded exercise program.  Requested patient to weigh themselves daily, and to notify us if their weight increases by more than 3 lbs in 1 day or 5 lbs in 1 week.

## 2024-01-29 NOTE — PROGRESS NOTES
The patient location is: Home  The chief complaint leading to consultation is: Pulmonary Hypertension    Visit type: audiovisual    Face to Face time with patient: 20  40 minutes of total time spent on the encounter, which includes face to face time and non-face to face time preparing to see the patient (eg, review of tests), Obtaining and/or reviewing separately obtained history, Documenting clinical information in the electronic or other health record, Independently interpreting results (not separately reported) and communicating results to the patient/family/caregiver, or Care coordination (not separately reported).     Each patient to whom he or she provides medical services by telemedicine is:  (1) informed of the relationship between the physician and patient and the respective role of any other health care provider with respect to management of the patient; and (2) notified that he or she may decline to receive medical services by telemedicine and may withdraw from such care at any time.     Patient ID:  Mya Sarmiento is a 33 y.o. female who presents for evaluation of Pulmonary Hypertension.    HPI   Mrs. Sarmiento was referred by Dr. Magana who saw patient on 12/15/2022. Mrs. Sarmiento has a history of thyroid disease, obesity, and severe PAH by RHC performed on 12/1/2022. Has a smoking history - quit 7 years ago. Was diagnosed with ovarian cysts following c/o pain in L side. Had tests done pre-procedure with ECHO concerning for severe PAH, followed by L/RHC that was consistent with severe PAH and no CAD noted. Other tests include negative V/Q and normal CXR. CT and PFTs have been completed but need to obtain.  Denies hx of heart disease or clotting disorder, illicit drug use, diet pills, CTD, or autoimmune disorders. Denies concerning family history. She has a 10 year-old son (uneventful birth) and 19 year-old stepson.  At initial clinic visit, Mrs. Sarmiento reported being generally healthy. Reported NYHA FC I  symptoms. She had lost 80 lbs over the last year with diet and exercise. She exercises 3x a week for 30 minutes to an hour doing bike, swim, stepper, hula hoop.   Right heart cath in March showed severe PAH and she was directly admitted to hospital for initiation of SC remodulin therapy. She was discharged on SC Remodulin and Opsumit.    Since last visit, Mrs. Sarmiento has had a cardioMems device placed and completed the Cereno study. She is currently stable on Remodulin (40 ng/kg/min) and Opsumit 10 mg daily, per last visit with Dr. Rolon (12/2023).    Mrs. Sarmiento says she feels well. Tolerates PH meds, including SC remodulin. Denies site pain and other SEs are managed. She is able to do all ADLs/IADLs. Does drive and exercise and doesn't feel limited by her PH. She will acknowledge that she is breathing better since starting PH medication. Manages fluid with lasix. Does endorse feeling tired. Patient denies chest pain, chest pressure, syncope, pre-syncope, lightheadedness, dizziness, PND, orthopnea, LE edema, abdominal pain, abdominal pressure, or N/V/F/C.    Has not had her thyroid levels checked in a while and does not have a PCP. Reports that her OBGYN has been managing things but she does not monitor often. Mrs. Sarmiento is still experiencing painful menses and heavy bleeding. She would like to establish care at [facility] and hopes for cyst removal or hysterectomy for some relief.      6MWT:  6MW 6/28/2023   6MWT Status completed without stopping   Patient Reported Dyspnea   Was O2 used? No   6MW Distance walked (feet) 1332   Distance walked (meters) 405.99   Did patient stop? No   Oxygen Saturation 98   Supplemental Oxygen Room Air   Heart Rate 89   Blood Pressure 115/70   Ayala Dyspnea Rating  nothing at all   Oxygen Saturation 95   Supplemental Oxygen Room Air   Heart Rate 130   Blood Pressure 130/70   Ayala Dyspnea Rating  very, very light (just noticeable)   Recovery Time (seconds) 91   Oxygen Saturation 97   Supplemental  Oxygen Room Air   Heart Rate 97     6MW 1/10/2023   6MWT Status completed without stopping   Patient Reported Dyspnea   Was O2 used? No   6MW Distance walked (feet) 1200   Distance walked (meters) 365.76   Did patient stop? No   Oxygen Saturation 99   Supplemental Oxygen Room Air   Heart Rate 80   Blood Pressure 125/67   Ayala Dyspnea Rating  nothing at all   Oxygen Saturation 98   Supplemental Oxygen Room Air   Heart Rate 114   Blood Pressure 134/90   Ayala Dyspnea Rating  very, very light (just noticeable)   Recovery Time (seconds) 194   Oxygen Saturation 97   Supplemental Oxygen Room Air   Heart Rate 94     RHC: 12/12/2023  RA: 8 PA: 93/ 26/ 51 PWP: 13 .   Cardiac output was 5 by Tesfaye. Cardiac index is 2.9 L/min/m2.   Pulmonary vascular resistance: 8.     ECHO: 2/1/2023  The left ventricle is normal in size with concentric remodeling and normal systolic function.  The estimated ejection fraction is 60%.  Normal left ventricular diastolic function.  There is abnormal septal wall motion.  Moderate right ventricular enlargement with low normal right ventricular systolic function.  Moderate right atrial enlargement.  Mild aortic regurgitation.  Severe tricuspid regurgitation.  Intermediate central venous pressure (8 mmHg).  The estimated PA systolic pressure is 86 mmHg.  There is severe pulmonary hypertension.  Trivial pericardial effusion. Under the RA.    ECHO:           RHC: 2/1/2023  RA: 27/ 27/ 24 RV: 94/ 8/ 24 PA: 94/ 40/ 58 PWP: 10/ 27/ 12 .   Cardiac output was 4.8  by Tesfaye. Cardiac index is 2.6 L/min/m2.   O2 Sat: PA 62%.   Pulmonary vascular resistance: 9.6 CRAWFORD.   Condition 2: Peter at 20 ppm.  PA 97/39 (62)  PCWP 15, 27 (14)    Condition 3 Peter 40 ppm.  PA 96/37 (61)  PCWP 15, 21 (14)    Condition 4: Peter 80 ppm.  PA 96/38 (61)  PCWP 15/20 (14)  CO 5.5 l/min CI 3 l/min/m2    L/RHC: 12/1/2022  Procedure:  Left heart catheterization right heart catheterization shunt run and assessment of pulmonary  vasoreactivity  Pre Op Diagnosis:  Pulmonary arterial hypertension  Post Op Diagnosis:  Same  Indications:  Same  Physician: Dr. Kee Magana MD  Entry:  Left radial artery and left brachial vein  Findings:  Coronary angiography performed initially.    All coronary arteries are normal.    Normal origin of the left and right coronary ostium   Left main is widely patent  Lad and diagonal branches are widely patent   Circumflex and marginal branches are widely  Right coronary is a dominant vessel  Right coronary has a normal origin and is widely patent   The PDA and posterolateral branches are widely patent  There is no coronary fistula or coronary anomaly     LV function is normal on LV g  No gradient on pullback and no intraventricular gradient  No mitral regurgitation seen     Right heart catheterization was then performed and shunt run was performed.     Superior vena cava 71%  Right atrium 68%  Inferior vena cava 67%  Right ventricle 67%  Right ventricular outflow tract 69%   Main pulmonary artery 62%   Right upper pulmonary artery 65%  Left ventricle 94%   Aorta/subclavian 92%       LV pressure 127/20  Aortic pressure is 127/93  Wedge pressure 20   PA pressure is 110/48  RV pressure 110/36   RA pressure 40  Cardiac output 4.3 average over 3 measurements  Cardiac index 2.3     PVR = 11.8 Woods units     BSA  2.0 m2     Patient was then examined after giving supplemental oxygen for 5 minutes and there was no significant change in her pulmonary pressures.    Patient was examined after IV adenosine given at sequential dosing in 50 mcg increments peaking at a 250 microgram/kilogram per minute dosing   There does not appear to be any significant change in the pulmonary pressures indicating lack of pulmonary vaso reactivity.    V/Q SCAN: 12/13/2022  FINDINGS:  No ventilatory defects.     No perfusion defects.     Impression:     Low probability for pulmonary embolism.    CXR: 12/13/2022  FINDINGS:  Frontal chest  radiograph demonstrates clear lungs.  No pleural fluid.  Cardiomediastinal silhouette is unremarkable.  No osseous abnormality.     Impression:     No evidence of cardiopulmonary disease.    PFTs: 5/22/2023  No obstruction. Mild Restriction. Reduction in DLCO (24%).     CT CHEST: 11/3/2022      Review of Systems   Constitutional: Negative.   HENT: Negative.     Eyes: Negative.    Cardiovascular: Negative.    Respiratory: Negative.     Endocrine: Negative.    Hematologic/Lymphatic: Negative.    Skin: Negative.    Musculoskeletal: Negative.    Gastrointestinal: Negative.    Genitourinary: Negative.    Neurological: Negative.    Psychiatric/Behavioral: Negative.     Allergic/Immunologic: Negative.         Objective: There were no vitals taken for this visit. Patients does not have a BP cuff.   Vitals 12/2023 - Blood pressure 113/61, pulse 77, height 5' (1.524 m), weight 76.7 kg (169 lb 1.5 oz), SpO2 100 %.body mass index is 33.02 kg/m².       Lab Results   Component Value Date    BNP 35 01/25/2024     01/25/2024    K 4.2 01/25/2024    MG 2.2 01/25/2024     01/25/2024    CO2 27 01/25/2024    BUN 11 01/25/2024    CREATININE 0.9 01/25/2024     01/25/2024    AST 21 01/25/2024    ALT 28 01/25/2024    ALBUMIN 3.7 01/25/2024    PROT 8.1 01/25/2024    BILITOT 0.5 01/25/2024       Magnesium   Date Value Ref Range Status   01/25/2024 2.2 1.6 - 2.6 mg/dL Final       Lab Results   Component Value Date    WBC 8.06 01/25/2024    HGB 10.0 (L) 01/25/2024    HCT 34.5 (L) 01/25/2024    MCV 65 (L) 01/25/2024     01/25/2024       BNP   Date Value Ref Range Status   01/25/2024 35 0 - 99 pg/mL Final     Comment:     Values of less than 100 pg/ml are consistent with non-CHF populations.   06/28/2023 58 0 - 99 pg/mL Final     Comment:     Values of less than 100 pg/ml are consistent with non-CHF populations.   01/10/2023 235 (H) 0 - 99 pg/mL Final     Comment:     Values of less than 100 pg/ml are consistent with  non-CHF populations.      6/28/2023   Pulmonary Hypertension Review Flowsheet    Medication Type SQ Remodulin    Dose (ng/kg/min) 40.65 ng/kg/min    Vial Size 10 mg/ml    DW (kg)  82 kg    Amt of Med used (ml) 1.8 ml    Pump Rate 20 nl/hr        ..  WHO Group: 1  Functional Class: I  REVEAL Score: 3 (Low Risk)    Assessment:       1. WHO group 1 pulmonary arterial hypertension    2. Iron deficiency    3. Thyroid disease    4. Cyst of ovary, unspecified laterality    5. High risk medication use - SC Remodulin         Plan:   Mrs. Sarmiento is doing well on current therapy - 40 ng/kg/min and Opsumit 10mg. She is FC I and low risk. We discussed the 3 pathways that can be disrupted in PAH and treatment for each and that best outcomes are from using triple therapy. Reinforced progressive nature of disease.     No medication changes today.    Will plan for addition of tadalafil.     Will draw Thyroid labs and iron studies. Have urged her to find a local PCP.    Needs referral to [facility]. Noted that we would want any procedures done at [facility].     Plan for follow-up in 2-3 months with coordination with OBGYN appointment.    Recommend 2 gram sodium restriction and 1500cc fluid restriction.  Encourage physical activity with graded exercise program.  Requested patient to weigh themselves daily, and to notify us if their weight increases by more than 3 lbs in 1 day or 5 lbs in 1 week.

## 2024-01-31 ENCOUNTER — LAB VISIT (OUTPATIENT)
Dept: LAB | Facility: HOSPITAL | Age: 34
End: 2024-01-31
Payer: COMMERCIAL

## 2024-01-31 DIAGNOSIS — E07.9 THYROID DISEASE: ICD-10-CM

## 2024-01-31 DIAGNOSIS — E61.1 IRON DEFICIENCY: ICD-10-CM

## 2024-01-31 LAB
FERRITIN SERPL-MCNC: 2 NG/ML (ref 20–300)
IRON SATN MFR SERPL: 3 % (ref 20–50)
IRON SERPL-MCNC: 15 UG/DL (ref 30–160)
T4 FREE SERPL-MCNC: 1.43 NG/DL (ref 0.71–1.51)
TOTAL IRON BINDING CAPACITY: 520 UG/DL (ref 250–450)
TSH SERPL DL<=0.005 MIU/L-ACNC: 0.26 UIU/ML (ref 0.4–4)

## 2024-01-31 PROCEDURE — 84443 ASSAY THYROID STIM HORMONE: CPT

## 2024-01-31 PROCEDURE — 84480 ASSAY TRIIODOTHYRONINE (T3): CPT

## 2024-01-31 PROCEDURE — 82728 ASSAY OF FERRITIN: CPT

## 2024-01-31 PROCEDURE — 83540 ASSAY OF IRON: CPT

## 2024-01-31 PROCEDURE — 84439 ASSAY OF FREE THYROXINE: CPT

## 2024-01-31 PROCEDURE — 36415 COLL VENOUS BLD VENIPUNCTURE: CPT

## 2024-02-01 LAB — T3 SERPL-MCNC: 116 NG/DL (ref 60–180)

## 2024-02-01 RX ORDER — TADALAFIL 20 MG/1
40 TABLET ORAL DAILY
Qty: 60 TABLET | Refills: 11 | Status: SHIPPED | OUTPATIENT
Start: 2024-02-01

## 2024-02-16 ENCOUNTER — LAB VISIT (OUTPATIENT)
Dept: LAB | Facility: HOSPITAL | Age: 34
End: 2024-02-16
Payer: COMMERCIAL

## 2024-02-16 DIAGNOSIS — Z32.00 ENCOUNTER FOR PREGNANCY TEST, RESULT UNKNOWN: ICD-10-CM

## 2024-02-16 LAB — HCG INTACT+B SERPL-ACNC: <1 MIU/ML

## 2024-02-16 PROCEDURE — 36415 COLL VENOUS BLD VENIPUNCTURE: CPT

## 2024-02-16 PROCEDURE — 84702 CHORIONIC GONADOTROPIN TEST: CPT

## 2024-02-17 ENCOUNTER — PATIENT MESSAGE (OUTPATIENT)
Dept: OBSTETRICS AND GYNECOLOGY | Facility: CLINIC | Age: 34
End: 2024-02-17

## 2024-02-19 ENCOUNTER — TELEPHONE (OUTPATIENT)
Dept: TRANSPLANT | Facility: CLINIC | Age: 34
End: 2024-02-19

## 2024-02-26 ENCOUNTER — PATIENT MESSAGE (OUTPATIENT)
Dept: TRANSPLANT | Facility: CLINIC | Age: 34
End: 2024-02-26

## 2024-02-26 DIAGNOSIS — N83.209 CYST OF OVARY, UNSPECIFIED LATERALITY: Primary | ICD-10-CM

## 2024-02-26 DIAGNOSIS — N92.1 MENORRHAGIA WITH IRREGULAR CYCLE: ICD-10-CM

## 2024-03-05 ENCOUNTER — PATIENT MESSAGE (OUTPATIENT)
Dept: TRANSPLANT | Facility: CLINIC | Age: 34
End: 2024-03-05

## 2024-03-05 ENCOUNTER — OFFICE VISIT (OUTPATIENT)
Dept: OBSTETRICS AND GYNECOLOGY | Facility: CLINIC | Age: 34
End: 2024-03-05
Payer: COMMERCIAL

## 2024-03-05 VITALS
WEIGHT: 172 LBS | BODY MASS INDEX: 33.77 KG/M2 | HEIGHT: 60 IN | SYSTOLIC BLOOD PRESSURE: 129 MMHG | HEART RATE: 79 BPM | DIASTOLIC BLOOD PRESSURE: 68 MMHG

## 2024-03-05 DIAGNOSIS — I27.21 WHO GROUP 1 PULMONARY ARTERIAL HYPERTENSION: ICD-10-CM

## 2024-03-05 DIAGNOSIS — E07.9 THYROID DISEASE: ICD-10-CM

## 2024-03-05 DIAGNOSIS — N83.209 CYST OF OVARY, UNSPECIFIED LATERALITY: ICD-10-CM

## 2024-03-05 DIAGNOSIS — N92.1 MENORRHAGIA WITH IRREGULAR CYCLE: Primary | ICD-10-CM

## 2024-03-05 PROCEDURE — 99213 OFFICE O/P EST LOW 20 MIN: CPT | Mod: S$GLB,,, | Performed by: OBSTETRICS & GYNECOLOGY

## 2024-03-05 PROCEDURE — 99999 PR PBB SHADOW E&M-EST. PATIENT-LVL III: CPT | Mod: PBBFAC,,, | Performed by: OBSTETRICS & GYNECOLOGY

## 2024-03-05 RX ORDER — DROSPIRENONE AND ETHINYL ESTRADIOL 0.03MG-3MG
1 KIT ORAL DAILY
Qty: 28 TABLET | Refills: 11 | Status: SHIPPED | OUTPATIENT
Start: 2024-03-05 | End: 2025-03-05

## 2024-03-05 NOTE — PROGRESS NOTES
Subjective:    Patient ID: Mya Sarmiento is a 33 y.o. y.o. female    Chief Complaint:   Chief Complaint   Patient presents with    Menorrhagia       History of Present Illness:  Mya presents today for follow up of menorrhagia and ovarian cysts.  She was being seen by provider at [facility] and was in the process of her preoperative testing prior to ovarian cystectomy and was found to have an issue with her lungs.  She was then sent Cardiology and ultimately diagnosed with pulmonary arterial hypertension.  She is currently on several medications and states that she is doing well with it.  Now she is in the process of being evaluated by gyn Oncology at [facility] in order to be able to have the ovarian cystectomy      Review of Systems   Constitutional:  Negative for chills and fever.   Gastrointestinal:  Negative for constipation.   Genitourinary:  Positive for menorrhagia, menstrual problem and vaginal bleeding.   Neurological:  Negative for headaches.         Objective:    Vital Signs:  Vitals:    03/05/24 1128   BP: 129/68   Pulse: 79     Wt Readings from Last 1 Encounters:   03/05/24 78 kg (172 lb)     Body mass index is 33.59 kg/m².    Physical Exam:  General:  alert, no distress   Skin:  Skin color, texture, turgor normal. No rashes or lesions   Abdomen:  Soft, nontender   Extremities: No cyanosis, clubbing, edema         Ultrasound:  Endometrial lining 4 mm.  Bilateral ovaries enlarged with multiple cysts.  Right ovary 13.9 x 6.8 cm with largest cyst 7.8 x 5.8 x 7.4 cm.  Left ovary 12.8 x 12.8 x 9.1 cm with largest cyst 8.0 x 5.2 x 10.9 cm.  This is not much change compared to last ultrasound here.    Discussed with patient that we will change her medication to another brand of oral contraceptive to continue good cycle control and pregnancy prevention.  All questions answered    I spent a total of 20 minutes on the day of the visit.  This includes face to face time and non-face to face time  preparing to see the patient (eg, review of tests), obtaining and/or reviewing separately obtained history, documenting clinical information in the electronic or other health record, independently interpreting results and communicating results to the patient/family/caregiver, or care coordinator.           Assessment:      1. Menorrhagia with irregular cycle    2. Cyst of ovary, unspecified laterality    3. WHO group 1 pulmonary arterial hypertension    4. Thyroid disease          Plan:      Menorrhagia with irregular cycle  -     Ambulatory referral/consult to Obstetrics / Gynecology  -     drospirenone-ethinyl estradioL (VALERIO, 28,) 3-0.03 mg per tablet; Take 1 tablet by mouth once daily.  Dispense: 28 tablet; Refill: 11    Cyst of ovary, unspecified laterality  -     Ambulatory referral/consult to Obstetrics / Gynecology    WHO group 1 pulmonary arterial hypertension    Thyroid disease    [facility] appt with gyn's at [facility]         Ashanti Turner MD, FACOG   03/05/2024 11:42 AM

## 2024-03-18 ENCOUNTER — LAB VISIT (OUTPATIENT)
Dept: LAB | Facility: HOSPITAL | Age: 34
End: 2024-03-18
Payer: COMMERCIAL

## 2024-03-18 DIAGNOSIS — Z32.00 ENCOUNTER FOR PREGNANCY TEST, RESULT UNKNOWN: ICD-10-CM

## 2024-03-18 LAB — HCG INTACT+B SERPL-ACNC: <1 MIU/ML

## 2024-03-18 PROCEDURE — 36415 COLL VENOUS BLD VENIPUNCTURE: CPT

## 2024-03-18 PROCEDURE — 84702 CHORIONIC GONADOTROPIN TEST: CPT

## 2024-03-21 ENCOUNTER — TELEPHONE (OUTPATIENT)
Dept: TRANSPLANT | Facility: CLINIC | Age: 34
End: 2024-03-21

## 2024-04-16 ENCOUNTER — PATIENT MESSAGE (OUTPATIENT)
Dept: TRANSPLANT | Facility: CLINIC | Age: 34
End: 2024-04-16

## 2024-04-17 ENCOUNTER — LAB VISIT (OUTPATIENT)
Dept: LAB | Facility: HOSPITAL | Age: 34
End: 2024-04-17
Payer: COMMERCIAL

## 2024-04-17 DIAGNOSIS — Z32.00 ENCOUNTER FOR PREGNANCY TEST, RESULT UNKNOWN: ICD-10-CM

## 2024-04-17 LAB — HCG INTACT+B SERPL-ACNC: <1 MIU/ML

## 2024-04-17 PROCEDURE — 36415 COLL VENOUS BLD VENIPUNCTURE: CPT

## 2024-04-17 PROCEDURE — 84702 CHORIONIC GONADOTROPIN TEST: CPT

## 2024-04-22 ENCOUNTER — TELEPHONE (OUTPATIENT)
Dept: TRANSPLANT | Facility: CLINIC | Age: 34
End: 2024-04-22

## 2024-04-23 RX ORDER — PREGABALIN 50 MG/1
100 CAPSULE ORAL NIGHTLY
Qty: 60 CAPSULE | Refills: 11 | Status: SHIPPED | OUTPATIENT
Start: 2024-04-23 | End: 2025-04-18

## 2024-05-01 ENCOUNTER — PATIENT MESSAGE (OUTPATIENT)
Dept: TRANSPLANT | Facility: CLINIC | Age: 34
End: 2024-05-01

## 2024-05-03 ENCOUNTER — TELEPHONE (OUTPATIENT)
Dept: TRANSPLANT | Facility: CLINIC | Age: 34
End: 2024-05-03

## 2024-05-03 NOTE — TELEPHONE ENCOUNTER
Spoke with Adry at [employer] who explained that patient's prescription plan changed on 5/1. Prior authorizations for both Tadalafil and Remodulin will need to be submitted.     Provided PH office fax number.     ----- Message from Nelly Buchanan sent at 5/3/2024  9:24 AM CDT -----  Regarding: Prior Auth F/U  Ani  with [employer] following up on 2 prior auth that were sent over for this pt.    Tadalafil 20 mg PA sent 5/1/24    Remodulin 2.5 mg/ml sent 4/26/24     Call [phone] and you can speak with any PA person to help you.    Thanks

## 2024-05-09 ENCOUNTER — TELEPHONE (OUTPATIENT)
Dept: TRANSPLANT | Facility: CLINIC | Age: 34
End: 2024-05-09

## 2024-05-17 ENCOUNTER — LAB VISIT (OUTPATIENT)
Dept: LAB | Facility: HOSPITAL | Age: 34
End: 2024-05-17
Payer: COMMERCIAL

## 2024-05-17 DIAGNOSIS — Z32.00 ENCOUNTER FOR PREGNANCY TEST, RESULT UNKNOWN: ICD-10-CM

## 2024-05-17 LAB — HCG INTACT+B SERPL-ACNC: <1 MIU/ML

## 2024-05-17 PROCEDURE — 84702 CHORIONIC GONADOTROPIN TEST: CPT

## 2024-05-17 PROCEDURE — 36415 COLL VENOUS BLD VENIPUNCTURE: CPT

## 2024-05-21 ENCOUNTER — PATIENT MESSAGE (OUTPATIENT)
Dept: TRANSPLANT | Facility: CLINIC | Age: 34
End: 2024-05-21

## 2024-06-06 ENCOUNTER — PATIENT MESSAGE (OUTPATIENT)
Dept: TRANSPLANT | Facility: CLINIC | Age: 34
End: 2024-06-06

## 2024-06-06 DIAGNOSIS — N83.209 CYST OF OVARY, UNSPECIFIED LATERALITY: Primary | ICD-10-CM

## 2024-06-07 ENCOUNTER — TELEPHONE (OUTPATIENT)
Dept: GYNECOLOGIC ONCOLOGY | Facility: CLINIC | Age: 34
End: 2024-06-07

## 2024-06-07 NOTE — TELEPHONE ENCOUNTER
Spoke with our patient about her schedule appointment. She requested to be seen in [place] ----- Message from Shayna Nash RN sent at 6/7/2024 12:07 PM CDT -----  Regarding: Referral  This pt has a referral to gyn/onc, but lives in [place]. Can you please contact her to offer her an appt with someone in [place].    Shayna

## 2024-06-10 DIAGNOSIS — R06.82 TACHYPNEA: Primary | ICD-10-CM

## 2024-06-10 DIAGNOSIS — Z79.899 POLYPHARMACY: ICD-10-CM

## 2024-06-10 DIAGNOSIS — I27.9 CHRONIC PULMONARY HEART DISEASE: ICD-10-CM

## 2024-06-10 DIAGNOSIS — R06.02 SHORTNESS OF BREATH: ICD-10-CM

## 2024-06-12 DIAGNOSIS — N94.89 ADNEXAL MASS: Primary | ICD-10-CM

## 2024-06-16 NOTE — PROGRESS NOTES
"REFERRING PROVIDER  Jesusita Carvalho, *     HISTORY OF PRESENT CONDITION  Chief complaint: bilateral ovarian mass  Mya Sarmiento is a 34 y.o.  who presents in consultation for an opinion regarding a bilateral ovarian mass with a CA-125 of ?.    +AUB characterized by heavy menstrual flow. "Large clots." -CP, dyspnea. +Dsymenorrhea. On OCPs. -Post coital bleeding. -h/o abnormal Pap smears. -Bloating. -Abdominal pain. +PO. -N/V. +Flatus. +BM.      REVIEW OF SYSTEMS  All systems reviewed and negative except as noted in HPI.    OBJECTIVE   Vitals:    24 0820   BP: 116/75   Pulse: 78      Body mass index is 34.4 kg/m².      1. General: Well appearing, no apparent distress, alert and oriented.  2. Lymph: Neck symmetric without cervical or supraclavicular adenopathy or mass.  3. Lungs: Normal respiratory rate, no accessory muscle use.  4. Cardiac: Normal rate  5. Psych: Normal affect.  6. Abdomen:  non-distended, soft, non-tender, are no masses, no ascites, no hepatosplenomegaly.  7. Skin: Warm, dry, no rashes or lesions.   8. Extremities: Bilateral lower extremities without edema or tenderness.  9. Genitourinary: Deferred               ECOG status: 0    LABORATORY DATA  Lab data reviewed.    RADIOLOGICAL DATA  Radiology data reviewed.    PATHOLOGY DATA  Pathology data reviewed.    ASSESSMENT / PLAN     1. Adnexal mass    2. Acute on chronic congestive heart failure with right ventricular diastolic dysfunction    3. Chronic pulmonary heart disease    4. Pulmonary hypertension    5. Severe tricuspid regurgitation    6. BMI 32.0-32.9,adult    7. Cyst of ovary, unspecified laterality       F/U CT A/P w/o  F/U CA-125, LDH, AFP  F/U Cardiology clearance (Jesusita Carvalho NP)  F/U Pulmonology clearance (Amish Garrido MD)  F/U Anesthesia  F/U IM (Dr. Ricci)    First and foremost I explained to this patient that cystectomies is not in my scope of practice, but since she was referred to me, I am happy to " perform her surgery.  However, in the future, if she wants to have another cystectomy, this will need to be performed by OB/GYN regardless of her co morbidities and location.    I will plan to perform this in a robotic fashion.  This may include a unilateral salpingo-oophorectomy and hysterectomy.  The procedure and its risks and benefits were discussed in detail, including the risk of regret with a salpingectomy +/- hysterectomy and risk of surgical menopause.     *23h observation  *Cardiology consult    PATIENT EDUCATION  Ready to learn, no apparent learning barriers were identified; learning preferences include listening.  Explained diagnosis and treatment plan; patient expressed understanding of the content.    INFORMED CONSENT  Discussed the risks, benefits, and alternatives of the procedure and of possible blood transfusion.  Discussed the necessity of other members of the healthcare team participating in the procedure.  All questions answered and consent given.      Kris Melvin

## 2024-06-17 ENCOUNTER — LAB VISIT (OUTPATIENT)
Dept: LAB | Facility: HOSPITAL | Age: 34
End: 2024-06-17
Payer: COMMERCIAL

## 2024-06-17 DIAGNOSIS — Z79.899 POLYPHARMACY: ICD-10-CM

## 2024-06-17 DIAGNOSIS — R06.82 TACHYPNEA: ICD-10-CM

## 2024-06-17 DIAGNOSIS — Z32.00 ENCOUNTER FOR PREGNANCY TEST, RESULT UNKNOWN: ICD-10-CM

## 2024-06-17 LAB
ALBUMIN SERPL BCP-MCNC: 3.3 G/DL (ref 3.5–5.2)
ALP SERPL-CCNC: 135 U/L (ref 55–135)
ALT SERPL W/O P-5'-P-CCNC: 26 U/L (ref 10–44)
ANION GAP SERPL CALC-SCNC: 11 MMOL/L (ref 3–11)
AST SERPL-CCNC: 13 U/L (ref 10–40)
BASOPHILS # BLD AUTO: 0.05 K/UL (ref 0–0.2)
BASOPHILS NFR BLD: 0.8 % (ref 0–1.9)
BILIRUB SERPL-MCNC: 0.6 MG/DL (ref 0.1–1)
BUN SERPL-MCNC: 15 MG/DL (ref 6–20)
CALCIUM SERPL-MCNC: 8.5 MG/DL (ref 8.7–10.5)
CHLORIDE SERPL-SCNC: 99 MMOL/L (ref 95–110)
CO2 SERPL-SCNC: 26 MMOL/L (ref 23–29)
CREAT SERPL-MCNC: 0.9 MG/DL (ref 0.5–1.4)
DIFFERENTIAL METHOD BLD: ABNORMAL
EOSINOPHIL # BLD AUTO: 0.3 K/UL (ref 0–0.5)
EOSINOPHIL NFR BLD: 4.2 % (ref 0–8)
ERYTHROCYTE [DISTWIDTH] IN BLOOD BY AUTOMATED COUNT: 15.2 % (ref 11.5–14.5)
EST. GFR  (NO RACE VARIABLE): >60 ML/MIN/1.73 M^2
GLUCOSE SERPL-MCNC: 123 MG/DL (ref 70–110)
HCG INTACT+B SERPL-ACNC: <1 MIU/ML
HCT VFR BLD AUTO: 42.1 % (ref 37–48.5)
HGB BLD-MCNC: 14.1 G/DL (ref 12–16)
IMM GRANULOCYTES # BLD AUTO: 0.02 K/UL (ref 0–0.04)
IMM GRANULOCYTES NFR BLD AUTO: 0.3 % (ref 0–0.5)
LYMPHOCYTES # BLD AUTO: 1.3 K/UL (ref 1–4.8)
LYMPHOCYTES NFR BLD: 20 % (ref 18–48)
MAGNESIUM SERPL-MCNC: 1.8 MG/DL (ref 1.6–2.6)
MCH RBC QN AUTO: 27.1 PG (ref 27–31)
MCHC RBC AUTO-ENTMCNC: 33.5 G/DL (ref 32–36)
MCV RBC AUTO: 81 FL (ref 82–98)
MONOCYTES # BLD AUTO: 0.4 K/UL (ref 0.3–1)
MONOCYTES NFR BLD: 5.3 % (ref 4–15)
NEUTROPHILS # BLD AUTO: 4.6 K/UL (ref 1.8–7.7)
NEUTROPHILS NFR BLD: 69.4 % (ref 38–73)
NRBC BLD-RTO: 0 /100 WBC
PLATELET # BLD AUTO: 294 K/UL (ref 150–450)
PMV BLD AUTO: 10.9 FL (ref 9.2–12.9)
POTASSIUM SERPL-SCNC: 3.8 MMOL/L (ref 3.5–5.1)
PROT SERPL-MCNC: 7.4 G/DL (ref 6–8.4)
RBC # BLD AUTO: 5.21 M/UL (ref 4–5.4)
SODIUM SERPL-SCNC: 136 MMOL/L (ref 136–145)
WBC # BLD AUTO: 6.6 K/UL (ref 3.9–12.7)

## 2024-06-17 PROCEDURE — 83735 ASSAY OF MAGNESIUM: CPT | Performed by: INTERNAL MEDICINE

## 2024-06-17 PROCEDURE — 80053 COMPREHEN METABOLIC PANEL: CPT | Performed by: INTERNAL MEDICINE

## 2024-06-17 PROCEDURE — 85025 COMPLETE CBC W/AUTO DIFF WBC: CPT | Performed by: INTERNAL MEDICINE

## 2024-06-17 PROCEDURE — 84702 CHORIONIC GONADOTROPIN TEST: CPT

## 2024-06-17 PROCEDURE — 36415 COLL VENOUS BLD VENIPUNCTURE: CPT

## 2024-06-17 PROCEDURE — 83880 ASSAY OF NATRIURETIC PEPTIDE: CPT | Performed by: INTERNAL MEDICINE

## 2024-06-18 ENCOUNTER — OFFICE VISIT (OUTPATIENT)
Dept: GYNECOLOGIC ONCOLOGY | Facility: CLINIC | Age: 34
End: 2024-06-18
Payer: COMMERCIAL

## 2024-06-18 ENCOUNTER — HOSPITAL ENCOUNTER (OUTPATIENT)
Dept: PULMONOLOGY | Facility: CLINIC | Age: 34
Discharge: HOME | End: 2024-06-18
Payer: COMMERCIAL

## 2024-06-18 ENCOUNTER — TELEPHONE (OUTPATIENT)
Dept: TRANSPLANT | Facility: CLINIC | Age: 34
End: 2024-06-18
Payer: COMMERCIAL

## 2024-06-18 ENCOUNTER — OFFICE VISIT (OUTPATIENT)
Dept: TRANSPLANT | Facility: CLINIC | Age: 34
End: 2024-06-18
Attending: INTERNAL MEDICINE
Payer: COMMERCIAL

## 2024-06-18 ENCOUNTER — HOSPITAL ENCOUNTER (OUTPATIENT)
Dept: CARDIOLOGY | Facility: HOSPITAL | Age: 34
Discharge: HOME | End: 2024-06-18
Attending: INTERNAL MEDICINE
Payer: COMMERCIAL

## 2024-06-18 VITALS
BODY MASS INDEX: 34.58 KG/M2 | OXYGEN SATURATION: 98 % | SYSTOLIC BLOOD PRESSURE: 112 MMHG | DIASTOLIC BLOOD PRESSURE: 61 MMHG | HEIGHT: 60 IN | WEIGHT: 176.13 LBS | HEART RATE: 78 BPM

## 2024-06-18 VITALS — WEIGHT: 175 LBS | BODY MASS INDEX: 34.36 KG/M2 | HEIGHT: 60 IN

## 2024-06-18 VITALS
HEIGHT: 60 IN | DIASTOLIC BLOOD PRESSURE: 75 MMHG | HEART RATE: 78 BPM | SYSTOLIC BLOOD PRESSURE: 116 MMHG | WEIGHT: 176.13 LBS | BODY MASS INDEX: 34.58 KG/M2

## 2024-06-18 VITALS
HEIGHT: 60 IN | WEIGHT: 174.19 LBS | SYSTOLIC BLOOD PRESSURE: 116 MMHG | HEART RATE: 75 BPM | BODY MASS INDEX: 34.2 KG/M2 | DIASTOLIC BLOOD PRESSURE: 75 MMHG

## 2024-06-18 DIAGNOSIS — N94.89 ADNEXAL MASS: Primary | ICD-10-CM

## 2024-06-18 DIAGNOSIS — I50.82 ACUTE ON CHRONIC CONGESTIVE HEART FAILURE WITH RIGHT VENTRICULAR DIASTOLIC DYSFUNCTION: ICD-10-CM

## 2024-06-18 DIAGNOSIS — N83.209 CYST OF OVARY, UNSPECIFIED LATERALITY: ICD-10-CM

## 2024-06-18 DIAGNOSIS — Z79.899 HIGH RISK MEDICATION USE: ICD-10-CM

## 2024-06-18 DIAGNOSIS — I27.9 CHRONIC PULMONARY HEART DISEASE: ICD-10-CM

## 2024-06-18 DIAGNOSIS — I27.20 PULMONARY HYPERTENSION: ICD-10-CM

## 2024-06-18 DIAGNOSIS — I27.21 WHO GROUP 1 PULMONARY ARTERIAL HYPERTENSION: Primary | ICD-10-CM

## 2024-06-18 DIAGNOSIS — R06.02 SHORTNESS OF BREATH: ICD-10-CM

## 2024-06-18 DIAGNOSIS — I07.1 SEVERE TRICUSPID REGURGITATION: ICD-10-CM

## 2024-06-18 DIAGNOSIS — I50.33 ACUTE ON CHRONIC CONGESTIVE HEART FAILURE WITH RIGHT VENTRICULAR DIASTOLIC DYSFUNCTION: ICD-10-CM

## 2024-06-18 DIAGNOSIS — E07.9 THYROID DISEASE: ICD-10-CM

## 2024-06-18 LAB
ASCENDING AORTA: 2.6 CM
AV INDEX (PROSTH): 0.65
AV MEAN GRADIENT: 9 MMHG
AV PEAK GRADIENT: 17 MMHG
AV VALVE AREA BY VELOCITY RATIO: 1.92 CM²
AV VALVE AREA: 1.89 CM²
AV VELOCITY RATIO: 0.67
BNP SERPL-MCNC: 50 PG/ML (ref 0–99)
BSA FOR ECHO PROCEDURE: 1.83 M2
CV ECHO LV RWT: 0.36 CM
DOP CALC AO PEAK VEL: 2.06 M/S
DOP CALC AO VTI: 43.9 CM
DOP CALC LVOT AREA: 2.9 CM2
DOP CALC LVOT DIAMETER: 1.92 CM
DOP CALC LVOT PEAK VEL: 1.37 M/S
DOP CALC LVOT STROKE VOLUME: 82.82 CM3
DOP CALCLVOT PEAK VEL VTI: 28.62 CM
E WAVE DECELERATION TIME: 407.72 MSEC
E/A RATIO: 1.23
E/E' RATIO: 9 M/S
ECHO LV POSTERIOR WALL: 0.78 CM (ref 0.6–1.1)
FRACTIONAL SHORTENING: 43 % (ref 28–44)
INTERVENTRICULAR SEPTUM: 0.98 CM (ref 0.6–1.1)
IVRT: 95.15 MSEC
LA MAJOR: 4.26 CM
LA MINOR: 4.26 CM
LA WIDTH: 3.28 CM
LEFT ATRIUM SIZE: 3.45 CM
LEFT ATRIUM VOLUME INDEX MOD: 18.1 ML/M2
LEFT ATRIUM VOLUME INDEX: 23.3 ML/M2
LEFT ATRIUM VOLUME MOD: 31.92 CM3
LEFT ATRIUM VOLUME: 40.98 CM3
LEFT INTERNAL DIMENSION IN SYSTOLE: 2.52 CM (ref 2.1–4)
LEFT VENTRICLE DIASTOLIC VOLUME INDEX: 49.59 ML/M2
LEFT VENTRICLE DIASTOLIC VOLUME: 87.27 ML
LEFT VENTRICLE MASS INDEX: 70 G/M2
LEFT VENTRICLE SYSTOLIC VOLUME INDEX: 13 ML/M2
LEFT VENTRICLE SYSTOLIC VOLUME: 22.8 ML
LEFT VENTRICULAR INTERNAL DIMENSION IN DIASTOLE: 4.39 CM (ref 3.5–6)
LEFT VENTRICULAR MASS: 123.74 G
LV LATERAL E/E' RATIO: 6.43 M/S
LV SEPTAL E/E' RATIO: 15 M/S
MV A" WAVE DURATION": 11.7 MSEC
MV PEAK A VEL: 0.73 M/S
MV PEAK E VEL: 0.9 M/S
OHS CV RV/LV RATIO: 1.06 CM
PISA TR MAX VEL: 3.96 M/S
PULM VEIN S/D RATIO: 0.77
PV PEAK D VEL: 0.65 M/S
PV PEAK S VEL: 0.5 M/S
RA MAJOR: 4.72 CM
RA PRESSURE ESTIMATED: 3 MMHG
RA WIDTH: 3.32 CM
RIGHT VENTRICLE DIASTOLIC BASEL DIMENSION: 4.7 CM
RIGHT VENTRICLE FREE WALL STRAIN: 17.7 %
RIGHT VENTRICLE GLOBAL SYSTOLIC STRAIN: 17.6 %
RIGHT VENTRICULAR END-DIASTOLIC DIMENSION: 4.64 CM
RV TB RVSP: 7 MMHG
SINUS: 2.33 CM
STJ: 2.05 CM
TDI LATERAL: 0.14 M/S
TDI SEPTAL: 0.06 M/S
TDI: 0.1 M/S
TR MAX PG: 63 MMHG
TRICUSPID ANNULAR PLANE SYSTOLIC EXCURSION: 1.92 CM
TV REST PULMONARY ARTERY PRESSURE: 66 MMHG
Z-SCORE OF LEFT VENTRICULAR DIMENSION IN END DIASTOLE: -1.03
Z-SCORE OF LEFT VENTRICULAR DIMENSION IN END SYSTOLE: -1.41

## 2024-06-18 PROCEDURE — 99214 OFFICE O/P EST MOD 30 MIN: CPT | Mod: S$GLB,,,

## 2024-06-18 PROCEDURE — 93306 TTE W/DOPPLER COMPLETE: CPT

## 2024-06-18 PROCEDURE — 99999 PR PBB SHADOW E&M-EST. PATIENT-LVL V: CPT | Mod: PBBFAC,,, | Performed by: OBSTETRICS & GYNECOLOGY

## 2024-06-18 PROCEDURE — 93306 TTE W/DOPPLER COMPLETE: CPT | Mod: 26,,, | Performed by: INTERNAL MEDICINE

## 2024-06-18 PROCEDURE — 94618 PULMONARY STRESS TESTING: CPT | Mod: S$GLB,,, | Performed by: INTERNAL MEDICINE

## 2024-06-18 PROCEDURE — 99999 PR PBB SHADOW E&M-EST. PATIENT-LVL III: CPT | Mod: PBBFAC,,,

## 2024-06-18 PROCEDURE — 99245 OFF/OP CONSLTJ NEW/EST HI 55: CPT | Mod: S$GLB,,, | Performed by: OBSTETRICS & GYNECOLOGY

## 2024-06-18 RX ORDER — LIDOCAINE HYDROCHLORIDE 10 MG/ML
1 INJECTION, SOLUTION EPIDURAL; INFILTRATION; INTRACAUDAL; PERINEURAL ONCE
OUTPATIENT
Start: 2024-06-18 | End: 2024-06-18

## 2024-06-18 RX ORDER — HEPARIN SODIUM 5000 [USP'U]/ML
5000 INJECTION, SOLUTION INTRAVENOUS; SUBCUTANEOUS
OUTPATIENT
Start: 2024-06-18

## 2024-06-18 RX ORDER — CELECOXIB 200 MG/1
200 CAPSULE ORAL ONCE
OUTPATIENT
Start: 2024-06-18 | End: 2024-06-18

## 2024-06-18 RX ORDER — ACETAMINOPHEN 500 MG
1000 TABLET ORAL ONCE
OUTPATIENT
Start: 2024-06-18 | End: 2024-06-18

## 2024-06-18 RX ORDER — CLINDAMYCIN PHOSPHATE 900 MG/50ML
900 INJECTION, SOLUTION INTRAVENOUS
OUTPATIENT
Start: 2024-06-18

## 2024-06-18 NOTE — PROCEDURES
Mya Sarmiento is a 34 y.o.  female patient, who presents for a 6 minute walk test ordered by MD Gonzales.  The diagnosis is Pulmonary Hypertension.  The patient's BMI is 34.2 kg/m2.  Predicted distance (lower limit of normal) is 466.37 meters.      Test Results:    The test was completed without stopping.  The total time walked was 360 seconds.  During walking, the patient reported:  No complaints.  The patient used no assistive devices during testing.     06/18/2024---------Distance: 415.14 meters (1362 feet)     O2 Sat % Supplemental Oxygen Heart Rate Blood Pressure Ayala Scale   Pre-exercise  (Resting) 99 % Room Air 83 bpm 119/75 mmHg 0   During Exercise 98 % Room Air 116 bpm 136/84 mmHg 1   Post-exercise  (Recovery) 99 % Room Air 93 bpm       Recovery Time: 73 seconds    Performing nurse/tech: Carmen BINGHAM      PREVIOUS STUDY:   06/28/2023---------Distance: 405.99 meters (1332 feet)       O2 Sat % Supplemental Oxygen Heart Rate Blood Pressure Ayala Scale   Pre-exercise  (Resting) 98 % Room Air 89 bpm 115/70 mmHg 0   During Exercise 95 % Room Air 130 bpm 130/70 mmHg 0.5   Post-exercise  (Recovery) 97 % Room Air  97 bpm           CLINICAL INTERPRETATION:  Six minute walk distance is 415.14 meters (1362 feet) with very light dyspnea.  During exercise, there was no significant desaturation while breathing room air.  Blood pressure remained stable and Heart rate increased significantly with walking.  The patient did not report non-pulmonary symptoms during exercise.  Since the previous study in June 2023, exercise capacity is unchanged.  Based upon age and body mass index, exercise capacity is less than predicted.

## 2024-06-18 NOTE — TELEPHONE ENCOUNTER
Patient presented in clinic today for a follow up. Patient states that she is doing well, has no issues or problems. Patient's walk is stable and confirmed that she is still on 40.65 ng/kg/min. JENNIFER Nunn notified.

## 2024-06-18 NOTE — PROGRESS NOTES
Patient ID:  Mya Sarmiento is a 34 y.o. female who presents for evaluation of Pulmonary Hypertension.    HPI   Mrs. Sarmiento was referred by Dr. Magana who saw patient on 12/15/2022. Mrs. Sarmiento has a history of thyroid disease, obesity, and severe PAH by RHC performed on 12/1/2022. Has a smoking history - quit 7 years ago. Was diagnosed with ovarian cysts following c/o pain in L side. Had tests done pre-procedure with ECHO concerning for severe PAH, followed by L/RHC that was consistent with severe PAH and no CAD noted. Other tests include negative V/Q and normal CXR. CT and PFTs have been completed but need to obtain.  Denies hx of heart disease or clotting disorder, illicit drug use, diet pills, CTD, or autoimmune disorders. Denies concerning family history. She has a 10 year-old son (uneventful birth) and 19 year-old stepson.  At initial clinic visit, Mrs. Sarmiento reported being generally healthy. Reported NYHA FC I symptoms. She had lost 80 lbs over the last year with diet and exercise. She exercises 3x a week for 30 minutes to an hour doing bike, swim, stepper, hula hoop.   Right heart cath in March showed severe PAH and she was directly admitted to hospital for initiation of SC remodulin therapy. She was discharged on SC Remodulin and Opsumit.  Mrs. Sarmiento has had a cardioMems device placed and completed the Cereno study. She is currently stable on Remodulin (40 ng/kg/min) and Opsumit 10 mg daily, per last visit with Dr. Rolon (12/2023).    Mrs. Sarmiento is here for clinic f/u. She feels good. Tolerating PH meds. Denies site pain and other SEs are managed. She is able to do all ADLs/IADLs. Manages fluid with lasix. Recently saw a surgeon about a cystectomy/hysterectomy. Patient denies chest pain, chest pressure, syncope, pre-syncope, lightheadedness, dizziness, PND, orthopnea, LE edema, abdominal pain, abdominal pressure, or N/V/F/C.      6MWT:   6/28/2023 6/18/2024   6MW     6MWT Status completed without stopping   completed without stopping    Patient Reported Dyspnea  No complaints    Was O2 used? No  No    6MW Distance walked (feet) 1332 feet  1362 feet    Distance walked (meters) 405.99 meters  415.14 meters    Did patient stop? No  No    Oxygen Saturation 98 %  99 %    Supplemental Oxygen Room Air  Room Air    Heart Rate 89 bpm  83 bpm    Blood Pressure 115/70  119/75    Ayala Dyspnea Rating  nothing at all  nothing at all    Oxygen Saturation 95 %  97 %    Supplemental Oxygen Room Air  Room Air    Heart Rate 130 bpm  116 bpm    Blood Pressure 130/70  136/84    Ayala Dyspnea Rating  very, very light (just noticeable)  very light    Recovery Time (seconds) 91 seconds  73 seconds    Oxygen Saturation 97 %  99 %    Supplemental Oxygen Room Air     Heart Rate 97 bpm  93 bpm      ECHO: 6/18/2024    Left Ventricle: The left ventricle is normal in size. Normal wall thickness. Normal wall motion. Septal flattening in systole consistent with right ventricular pressure overload. There is normal systolic function with a visually estimated ejection fraction of 55 - 60%. There is normal diastolic function.    Right Ventricle: Mild right ventricular enlargement. Systolic function is mildly reduced. Right ventricular global longitudinal strain is - 17.6%. Free wall strain 17.7%.    Aortic Valve: There is mild aortic regurgitation.    Mitral Valve: There is mild regurgitation.    Tricuspid Valve: There is mild to moderate regurgitation.    Pulmonary Artery: The estimated pulmonary artery systolic pressure is 66 mmHg.    IVC/SVC: Normal venous pressure at 3 mmHg.    ECHO: 2/1/2023  The left ventricle is normal in size with concentric remodeling and normal systolic function.  The estimated ejection fraction is 60%.  Normal left ventricular diastolic function.  There is abnormal septal wall motion.  Moderate right ventricular enlargement with low normal right ventricular systolic function.  Moderate right atrial enlargement.  Mild aortic  regurgitation.  Severe tricuspid regurgitation.  Intermediate central venous pressure (8 mmHg).  The estimated PA systolic pressure is 86 mmHg.  There is severe pulmonary hypertension.  Trivial pericardial effusion. Under the RA.    ECHO:           RHC: 12/12/2023  RA: 8 PA: 93/ 26/ 51 PWP: 13 .   Cardiac output was 5 by Tesfaye. Cardiac index is 2.9 L/min/m2.   Pulmonary vascular resistance: 8.     RHC: 2/1/2023  RA: 27/ 27/ 24 RV: 94/ 8/ 24 PA: 94/ 40/ 58 PWP: 10/ 27/ 12 .   Cardiac output was 4.8  by Tesfaye. Cardiac index is 2.6 L/min/m2.   O2 Sat: PA 62%.   Pulmonary vascular resistance: 9.6 CRAWFORD.   Condition 2: Peter at 20 ppm.  PA 97/39 (62)  PCWP 15, 27 (14)    Condition 3 Peter 40 ppm.  PA 96/37 (61)  PCWP 15, 21 (14)    Condition 4: Peter 80 ppm.  PA 96/38 (61)  PCWP 15/20 (14)  CO 5.5 l/min CI 3 l/min/m2    L/RHC: 12/1/2022  Procedure:  Left heart catheterization right heart catheterization shunt run and assessment of pulmonary vasoreactivity  Pre Op Diagnosis:  Pulmonary arterial hypertension  Post Op Diagnosis:  Same  Indications:  Same  Physician: Dr. Kee Magana MD  Entry:  Left radial artery and left brachial vein  Findings:  Coronary angiography performed initially.    All coronary arteries are normal.    Normal origin of the left and right coronary ostium   Left main is widely patent  Lad and diagonal branches are widely patent   Circumflex and marginal branches are widely  Right coronary is a dominant vessel  Right coronary has a normal origin and is widely patent   The PDA and posterolateral branches are widely patent  There is no coronary fistula or coronary anomaly     LV function is normal on LV g  No gradient on pullback and no intraventricular gradient  No mitral regurgitation seen     Right heart catheterization was then performed and shunt run was performed.     Superior vena cava 71%  Right atrium 68%  Inferior vena cava 67%  Right ventricle 67%  Right ventricular outflow tract 69%   Main pulmonary  artery 62%   Right upper pulmonary artery 65%  Left ventricle 94%   Aorta/subclavian 92%       LV pressure 127/20  Aortic pressure is 127/93  Wedge pressure 20   PA pressure is 110/48  RV pressure 110/36   RA pressure 40  Cardiac output 4.3 average over 3 measurements  Cardiac index 2.3     PVR = 11.8 Woods units     BSA  2.0 m2     Patient was then examined after giving supplemental oxygen for 5 minutes and there was no significant change in her pulmonary pressures.    Patient was examined after IV adenosine given at sequential dosing in 50 mcg increments peaking at a 250 microgram/kilogram per minute dosing   There does not appear to be any significant change in the pulmonary pressures indicating lack of pulmonary vaso reactivity.    V/Q SCAN: 12/13/2022  FINDINGS:  No ventilatory defects.     No perfusion defects.     Impression:     Low probability for pulmonary embolism.    CXR: 12/13/2022  FINDINGS:  Frontal chest radiograph demonstrates clear lungs.  No pleural fluid.  Cardiomediastinal silhouette is unremarkable.  No osseous abnormality.     Impression:     No evidence of cardiopulmonary disease.    PFTs: 5/22/2023  No obstruction. Mild Restriction. Reduction in DLCO (24%).     CT CHEST: 11/3/2022      Review of Systems   Constitutional: Negative.   HENT: Negative.     Eyes: Negative.    Cardiovascular: Negative.    Respiratory: Negative.     Endocrine: Negative.    Hematologic/Lymphatic: Negative.    Skin: Negative.    Musculoskeletal: Negative.    Gastrointestinal: Negative.    Genitourinary: Negative.    Neurological: Negative.    Psychiatric/Behavioral: Negative.     Allergic/Immunologic: Negative.         Objective: /61 (BP Location: Left arm, Patient Position: Sitting, BP Method: Medium (Automatic))   Pulse 78   Ht 5' (1.524 m)   Wt 79.9 kg (176 lb 2.4 oz)   SpO2 98%   BMI 34.40 kg/m²      Physical Exam  Constitutional:       Appearance: Normal appearance.   HENT:      Head: Normocephalic.    Cardiovascular:      Rate and Rhythm: Normal rate and regular rhythm.      Pulses: Normal pulses.      Heart sounds: Normal heart sounds.   Pulmonary:      Effort: Pulmonary effort is normal.      Breath sounds: Normal breath sounds.   Abdominal:      Palpations: Abdomen is soft.   Musculoskeletal:         General: Normal range of motion.      Cervical back: Normal range of motion.   Skin:     General: Skin is warm and dry.      Capillary Refill: Capillary refill takes less than 2 seconds.   Neurological:      Mental Status: She is oriented to person, place, and time.   Psychiatric:         Mood and Affect: Mood normal.         Behavior: Behavior normal.            Lab Results   Component Value Date    BNP 50 06/17/2024     06/17/2024    K 3.8 06/17/2024    MG 1.8 06/17/2024    CL 99 06/17/2024    CO2 26 06/17/2024    BUN 15 06/17/2024    CREATININE 0.9 06/17/2024     (H) 06/17/2024    AST 13 06/17/2024    ALT 26 06/17/2024    ALBUMIN 3.3 (L) 06/17/2024    PROT 7.4 06/17/2024    BILITOT 0.6 06/17/2024       Magnesium   Date Value Ref Range Status   06/17/2024 1.8 1.6 - 2.6 mg/dL Final       Lab Results   Component Value Date    WBC 6.60 06/17/2024    HGB 14.1 06/17/2024    HCT 42.1 06/17/2024    MCV 81 (L) 06/17/2024     06/17/2024       BNP   Date Value Ref Range Status   06/17/2024 50 0 - 99 pg/mL Final     Comment:     Values of less than 100 pg/ml are consistent with non-CHF populations.   01/25/2024 35 0 - 99 pg/mL Final     Comment:     Values of less than 100 pg/ml are consistent with non-CHF populations.   06/28/2023 58 0 - 99 pg/mL Final     Comment:     Values of less than 100 pg/ml are consistent with non-CHF populations.      6/28/2023   Pulmonary Hypertension Review Flowsheet    Medication Type SQ Remodulin    Dose (ng/kg/min) 40.65 ng/kg/min    Vial Size 10 mg/ml    DW (kg)  82 kg    Amt of Med used (ml) 1.8 ml    Pump Rate 20 nl/hr        ..  WHO Group: 1  Functional  Class: I  REVEAL Score: 3 (Low Risk)    Assessment:       1. WHO group 1 pulmonary arterial hypertension    2. High risk medication use - SC Remodulin    3. Thyroid disease    4. Cyst of ovary, unspecified laterality    5. Acute on chronic congestive heart failure with right ventricular diastolic dysfunction         Plan:   Mrs. Sarmiento is doing well on current therapy - 40 ng/kg/min, tadalafil 40 mg and Opsumit 10mg. She is FC I and low risk. Readings from her CMEMS show improved PA pressures -43/20/27. She has surgery planned for July 11th. We have communicated with Dr. Melvin and it will be done here at [facility]. She looks good and is optimized from a PH standpoint.    No medication changes today.    Will switch macitentan + tadalafil to Opsynvi combination therapy.    Return to clinic 3 months after 7/11 surgery with labs and walk.    Recommend 2 gram sodium restriction and 1500cc fluid restriction.  Encourage physical activity with graded exercise program.  Requested patient to weigh themselves daily, and to notify us if their weight increases by more than 3 lbs in 1 day or 5 lbs in 1 week.    Jesusita Carvalho DNP, APRN  Pulmonary Hypertension Department    [facility]  Dr. Vito Carvalho DNP, TAN Maciel, FELIN, RN

## 2024-06-19 ENCOUNTER — HOSPITAL ENCOUNTER (EMERGENCY)
Facility: HOSPITAL | Age: 34
Discharge: HOME | End: 2024-06-19
Attending: EMERGENCY MEDICINE
Payer: COMMERCIAL

## 2024-06-19 VITALS
BODY MASS INDEX: 35.34 KG/M2 | SYSTOLIC BLOOD PRESSURE: 109 MMHG | OXYGEN SATURATION: 98 % | WEIGHT: 180 LBS | DIASTOLIC BLOOD PRESSURE: 80 MMHG | HEIGHT: 60 IN | RESPIRATION RATE: 18 BRPM | TEMPERATURE: 98 F | HEART RATE: 84 BPM

## 2024-06-19 DIAGNOSIS — T78.40XA ALLERGIC REACTION, INITIAL ENCOUNTER: Primary | ICD-10-CM

## 2024-06-19 PROCEDURE — 99284 EMERGENCY DEPT VISIT MOD MDM: CPT | Mod: 25

## 2024-06-19 PROCEDURE — 25000003 PHARM REV CODE 250: Performed by: CLINICAL NURSE SPECIALIST

## 2024-06-19 PROCEDURE — 63600175 PHARM REV CODE 636 W HCPCS: Performed by: CLINICAL NURSE SPECIALIST

## 2024-06-19 PROCEDURE — 96372 THER/PROPH/DIAG INJ SC/IM: CPT | Performed by: CLINICAL NURSE SPECIALIST

## 2024-06-19 RX ORDER — PREDNISONE 20 MG/1
20 TABLET ORAL DAILY
Qty: 5 TABLET | Refills: 0 | Status: SHIPPED | OUTPATIENT
Start: 2024-06-19 | End: 2024-06-24

## 2024-06-19 RX ORDER — FAMOTIDINE 20 MG/1
20 TABLET, FILM COATED ORAL
Status: COMPLETED | OUTPATIENT
Start: 2024-06-19 | End: 2024-06-19

## 2024-06-19 RX ORDER — DIPHENHYDRAMINE HYDROCHLORIDE 50 MG/ML
50 INJECTION INTRAMUSCULAR; INTRAVENOUS ONCE
Status: COMPLETED | OUTPATIENT
Start: 2024-06-19 | End: 2024-06-19

## 2024-06-19 RX ORDER — DEXAMETHASONE SODIUM PHOSPHATE 4 MG/ML
8 INJECTION, SOLUTION INTRA-ARTICULAR; INTRALESIONAL; INTRAMUSCULAR; INTRAVENOUS; SOFT TISSUE
Status: COMPLETED | OUTPATIENT
Start: 2024-06-19 | End: 2024-06-19

## 2024-06-19 RX ORDER — HYDROXYZINE PAMOATE 25 MG/1
25 CAPSULE ORAL 4 TIMES DAILY
Qty: 15 CAPSULE | Refills: 0 | Status: SHIPPED | OUTPATIENT
Start: 2024-06-19

## 2024-06-19 RX ADMIN — FAMOTIDINE 20 MG: 20 TABLET, FILM COATED ORAL at 01:06

## 2024-06-19 RX ADMIN — DIPHENHYDRAMINE HYDROCHLORIDE 50 MG: 50 INJECTION INTRAMUSCULAR; INTRAVENOUS at 01:06

## 2024-06-19 RX ADMIN — DEXAMETHASONE SODIUM PHOSPHATE 8 MG: 4 INJECTION INTRA-ARTICULAR; INTRALESIONAL; INTRAMUSCULAR; INTRAVENOUS; SOFT TISSUE at 01:06

## 2024-06-19 NOTE — ED PROVIDER NOTES
Encounter Date: 6/19/2024       History     Chief Complaint   Patient presents with    Rash     Pt reports rash for three days now, only thing new is perfume. Rash is in areas that she would have sprayed it     34-year-old female presents emergency room with rash to chest for the last 3 days.  Questionable questionable new perfume.  Patient has been taking Benadryl and using over-the-counter medications and creams with no relief.  Room air sat 98%.  No respiratory distress noted.  Denies any throat irritation, sensation        Review of patient's allergies indicates:   Allergen Reactions    Amoxicillin Other (See Comments)     Weak, sick, jaw tightens    Penicillins      Weak, sick, jaw tightens     Past Medical History:   Diagnosis Date    BMI 32.0-32.9,adult 11/9/2022    Migraine headache 2000    Ongoing    Morbidly obese     Unknown start date; lost over 80 pounds between 9106-0296    Neuropathy 2019    Bilateral feet; ongoing    Ovarian cyst 2017    Bilaterally; ongoing    Pulmonary hypertension 12/01/2022    Ongoing    Severe tricuspid regurgitation 11/09/2022    Resolved 6/28/2023 per echo    Thyroid disease 2013    Ongoing (S/P left partial thyroidectomy)    Vaginal delivery 12/11/2012     Past Surgical History:   Procedure Laterality Date    INSERTION, WIRELESS SENSOR, FOR PULMONARY ARTERIAL PRESSURE MONITORING Right 8/18/2023    Procedure: Insertion, Wireless Sensor, For Pulmonary Arterial Pressure Monitoring;  Surgeon: Maged Rolon MD;  Location: [facility] CATH LAB;  Service: Cardiology;  Laterality: Right;    LEFT HEART CATHETERIZATION Left 12/01/2022    Procedure: Left heart cath;  Surgeon: Kee Magana MD;  Location: [facility] CATH;  Service: Cardiovascular;  Laterality: Left;    RIGHT HEART CATHETERIZATION Right 12/01/2022    Procedure: INSERTION, CATHETER, RIGHT HEART;  Surgeon: Kee Magana MD;  Location: [facility] CATH;  Service: Cardiovascular;  Laterality: Right;  + Shunt Run    RIGHT HEART  CATHETERIZATION Right 2023    Procedure: INSERTION, CATHETER, RIGHT HEART;  Surgeon: Danny Clark MD;  Location: [facility] CATH LAB;  Service: Cardiology;  Laterality: Right;  with nitric oxide study    RIGHT HEART CATHETERIZATION Right 2023    Procedure: INSERTION, CATHETER, RIGHT HEART;  Surgeon: Maged Rolon MD;  Location: [facility] CATH LAB;  Service: Cardiology;  Laterality: Right;    RIGHT HEART CATHETERIZATION Right 2023    Procedure: INSERTION, CATHETER, RIGHT HEART;  Surgeon: Maged Rolon MD;  Location: [facility] CATH LAB;  Service: Cardiology;  Laterality: Right;    SELECTIVE UNILATERAL PULMONARY ANGIOGRAPHY  2023    Procedure: Pumonary angiography - unilateral;  Surgeon: Maged Rolon MD;  Location: [facility] CATH LAB;  Service: Cardiology;;    THYROIDECTOMY, PARTIAL Left      Family History   Problem Relation Name Age of Onset    No Known Problems Mother      No Known Problems Father      Ovarian cancer Maternal Grandmother      Cancer Maternal Grandfather       Social History     Tobacco Use    Smoking status: Former     Current packs/day: 0.00     Average packs/day: 1 pack/day for 5.0 years (5.0 ttl pk-yrs)     Types: Cigarettes     Start date:      Quit date:      Years since quittin.4    Smokeless tobacco: Never   Substance Use Topics    Alcohol use: Not Currently     Comment: Reports drinking wine, malt liquor, and daquiris in the past. Current alcohol use 1-2 drinks once every 3-4 months.    Drug use: Never     Review of Systems   Constitutional:  Negative for fever.   HENT:  Negative for sore throat.    Respiratory:  Negative for shortness of breath.    Cardiovascular:  Negative for chest pain.   Gastrointestinal:  Negative for nausea.   Genitourinary:  Negative for dysuria.   Musculoskeletal:  Negative for back pain.   Skin:  Positive for color change and rash.   Neurological:  Negative for weakness.   Hematological:  Does not bruise/bleed easily.   All other  systems reviewed and are negative.      Physical Exam     Initial Vitals [06/19/24 1307]   BP Pulse Resp Temp SpO2   109/80 84 18 98.3 °F (36.8 °C) 98 %      MAP       --         Physical Exam    Nursing note and vitals reviewed.  Constitutional: She appears well-developed and well-nourished.   HENT:   Head: Normocephalic and atraumatic.   Eyes: Pupils are equal, round, and reactive to light.   Neck:   Normal range of motion.  Cardiovascular:  Normal rate and regular rhythm.           Pulmonary/Chest: Breath sounds normal.   Abdominal: Abdomen is soft. Bowel sounds are normal.   Musculoskeletal:         General: Normal range of motion.      Cervical back: Normal range of motion.     Neurological: She is alert and oriented to person, place, and time.   Skin: Skin is warm and dry.   Psychiatric: She has a normal mood and affect.         ED Course   Procedures  Labs Reviewed - No data to display       Imaging Results    None          Medications   dexAMETHasone injection 8 mg (8 mg Intramuscular Given 6/19/24 1317)   diphenhydrAMINE injection 50 mg (50 mg Intramuscular Given 6/19/24 1317)   famotidine tablet 20 mg (20 mg Oral Given 6/19/24 1318)     Medical Decision Making  Risk  Prescription drug management.                                      Clinical Impression:  Final diagnoses:  [T78.40XA] Allergic reaction, initial encounter (Primary)          ED Disposition Condition    Discharge Stable          ED Prescriptions       Medication Sig Dispense Start Date End Date Auth. Provider    predniSONE (DELTASONE) 20 MG tablet Take 1 tablet (20 mg total) by mouth once daily. for 5 days 5 tablet 6/19/2024 6/24/2024 Keyonna Perla NP    hydrOXYzine pamoate (VISTARIL) 25 MG Cap Take 1 capsule (25 mg total) by mouth 4 (four) times daily. 15 capsule 6/19/2024 -- Keyonna Perla NP          Follow-up Information    None          Keyonna Perla NP  06/19/24 3257

## 2024-06-24 ENCOUNTER — HOSPITAL ENCOUNTER (OUTPATIENT)
Dept: RADIOLOGY | Facility: HOSPITAL | Age: 34
Discharge: HOME | End: 2024-06-24
Attending: OBSTETRICS & GYNECOLOGY
Payer: COMMERCIAL

## 2024-06-24 DIAGNOSIS — N94.89 ADNEXAL MASS: ICD-10-CM

## 2024-06-24 PROCEDURE — 74176 CT ABD & PELVIS W/O CONTRAST: CPT | Mod: TC

## 2024-06-28 ENCOUNTER — TELEPHONE (OUTPATIENT)
Dept: GYNECOLOGIC ONCOLOGY | Facility: CLINIC | Age: 34
End: 2024-06-28
Payer: COMMERCIAL

## 2024-07-01 ENCOUNTER — PATIENT MESSAGE (OUTPATIENT)
Dept: RESEARCH | Facility: HOSPITAL | Age: 34
End: 2024-07-01
Payer: COMMERCIAL

## 2024-07-03 ENCOUNTER — PATIENT MESSAGE (OUTPATIENT)
Dept: RESEARCH | Facility: HOSPITAL | Age: 34
End: 2024-07-03
Payer: COMMERCIAL

## 2024-07-03 ENCOUNTER — TELEPHONE (OUTPATIENT)
Dept: TRANSPLANT | Facility: CLINIC | Age: 34
End: 2024-07-03
Payer: COMMERCIAL

## 2024-07-03 ENCOUNTER — PATIENT MESSAGE (OUTPATIENT)
Dept: TRANSPLANT | Facility: CLINIC | Age: 34
End: 2024-07-03
Payer: COMMERCIAL

## 2024-07-03 ENCOUNTER — RESEARCH ENCOUNTER (OUTPATIENT)
Dept: RESEARCH | Facility: HOSPITAL | Age: 34
End: 2024-07-03
Payer: COMMERCIAL

## 2024-07-03 NOTE — TELEPHONE ENCOUNTER
NN spoke with Carmen ramey from [facility] in regard to PA. Per Carmen, the Opsynvi does not need a PA and a test claim went through but they are determining which [facility] will dispense medication. Once that is determined then it will be sent to [facility] for dispense.

## 2024-07-03 NOTE — PROGRESS NOTES
Ms. Sarmiento was called today regarding her participation in (IRB #[id number] PI: Giovanni).   The Verbal Informed Consent was read and discussed by the consenter. The following was discussed:  Types of specimens to be collected  All medical information released to researchers will be stripped of identifiers and no patient information will be given to anyone outside of this research project.   Participating in a research study is not the same as getting regular medical care and will not improve the patient's health. The purpose of a research study is to gather information.  Being in this study does not interfere with your regular medical care.  The patient does not have to participate in this study. If they do not join, their care at [facility] will not be affected.  The person granting permission was provided adequate time to ask questions regarding the scope and purpose of the study.  Permission was obtained by telephone.   The above statements were read by the person obtaining permission to the person granting permission and witnessed by Khurram Starr. The witness information was documented on the verbal consent form as well.  This Verbal Informed Consent process was conducted prior to initiation of any study procedures.

## 2024-07-08 ENCOUNTER — DOCUMENTATION ONLY (OUTPATIENT)
Dept: TRANSPLANT | Facility: CLINIC | Age: 34
End: 2024-07-08
Payer: COMMERCIAL

## 2024-07-08 NOTE — PROGRESS NOTES
Pt CardioMems transmission received and review.          7/8/2024     9:45 AM 7/8/2024     9:42 AM 7/8/2024     9:41 AM   CardioMEMS Transmission    Transmission Date 7/8/2024 7/5/2024 7/1/2024   Transmission Type Maintenance Maintenance Maintenance   PAS 53 56 36   ALEKSANDER 33 34 27   PAD  23 22 21   Medication Adjustments  No No No         Pressures are stable.    Medications changed? No    Patient contacted? No    Will continue to monitor.

## 2024-07-10 ENCOUNTER — ANESTHESIA EVENT (OUTPATIENT)
Dept: SURGERY | Facility: HOSPITAL | Age: 34
End: 2024-07-10
Payer: COMMERCIAL

## 2024-07-10 ENCOUNTER — TELEPHONE (OUTPATIENT)
Dept: GYNECOLOGIC ONCOLOGY | Facility: CLINIC | Age: 34
End: 2024-07-10
Payer: COMMERCIAL

## 2024-07-10 ENCOUNTER — PATIENT MESSAGE (OUTPATIENT)
Dept: GYNECOLOGIC ONCOLOGY | Facility: CLINIC | Age: 34
End: 2024-07-10
Payer: COMMERCIAL

## 2024-07-10 NOTE — ANESTHESIA PREPROCEDURE EVALUATION
[facility]  Anesthesia Pre-Operative Evaluation         Patient Name: Mya Sarmiento  YOB: 1990  MRN: [id number]    SUBJECTIVE:     Pre-operative evaluation for Procedure(s) (LRB):  XI ROBOTIC HYSTERECTOMY (N/A)  SALPINGECTOMY (Bilateral)  EXCISION, CYST, OVARY (Bilateral)     07/10/2024    Mya Sarmiento is a 34 y.o. female w/ a significant PMHx of Severe Pulmonary HTN on home Treprostinil ([place]), tricuspid regurgitation, hypothyroidism, obesity, bilateral ovarian masses.    Patient now presents for the above procedure(s) in setting of ovarian masses.    Echo from Khloe 10, 2024:      Left Ventricle: The left ventricle is normal in size. Normal wall thickness. Normal wall motion. Septal flattening in systole consistent with right ventricular pressure overload. There is normal systolic function with a visually estimated ejection fraction of 55 - 60%. There is normal diastolic function.    Right Ventricle: Mild right ventricular enlargement. Systolic function is mildly reduced. Right ventricular global longitudinal strain is - 17.6%. Free wall strain 17.7%.    Aortic Valve: There is mild aortic regurgitation.    Mitral Valve: There is mild regurgitation.    Tricuspid Valve: There is mild to moderate regurgitation.    Pulmonary Artery: The estimated pulmonary artery systolic pressure is 66 mmHg.    IVC/SVC: Normal venous pressure at 3 mmHg.    LDA: None documented.     Prev airway: None documented.    Drips: None documented.      Patient Active Problem List   Diagnosis    Pulmonary hypertension    Severe tricuspid regurgitation    WHO group 1 pulmonary arterial hypertension    Chronic pulmonary heart disease    Thyroid disease    Ovarian cyst    Acute on chronic congestive heart failure with right ventricular diastolic dysfunction    Neuropathy    High risk medication use - SC Remodulin    BMI 32.0-32.9,adult       Review of patient's allergies indicates:   Allergen  Reactions    Amoxicillin Other (See Comments)     Weak, sick, jaw tightens    Penicillins      Weak, sick, jaw tightens       Current Inpatient Medications:      No current facility-administered medications on file prior to encounter.     Current Outpatient Medications on File Prior to Encounter   Medication Sig Dispense Refill    aspirin (ECOTRIN) 81 MG EC tablet Take 1 tablet (81 mg total) by mouth once daily. 30 tablet 11    drospirenone-ethinyl estradioL (VALERIO, 28,) 3-0.03 mg per tablet Take 1 tablet by mouth once daily. (Patient taking differently: Take 1 tablet by mouth every evening.) 28 tablet 11    furosemide (LASIX) 40 MG tablet Take 1 tablet (40 mg total) by mouth once daily. (Patient taking differently: Take 40 mg by mouth every morning.) 30 tablet 11    levothyroxine (SYNTHROID, LEVOTHROID) 175 MCG tablet Take 175 mcg by mouth every morning.      spironolactone (ALDACTONE) 25 MG tablet Take 1 tablet (25 mg total) by mouth once daily. (Patient taking differently: Take 25 mg by mouth every morning.) 30 tablet 11    tadalafil (ADCIRCA) 20 mg Tab Take 2 tablets (40 mg total) by mouth once daily. Take 1 tablet (20 mg) once daily for 2 weeks, then increase to 2 tablets (40 mg) once daily. 60 tablet 11    acetaminophen (TYLENOL) 500 MG tablet Take 2 tablets by mouth every 6 (six) hours as needed for Pain (migraine headaches).      macitentan 10 mg Tab Take 10 mg by mouth once daily.      pregabalin (LYRICA) 50 MG capsule TAKE 2 CAPSULES (100 MG TOTAL) BY MOUTH EVERY EVENING. 60 capsule 11    treprostinil (REMODULIN) 2.5 mg/mL Soln Inject into the skin continuous.         Past Surgical History:   Procedure Laterality Date    INSERTION, WIRELESS SENSOR, FOR PULMONARY ARTERIAL PRESSURE MONITORING Right 8/18/2023    Procedure: Insertion, Wireless Sensor, For Pulmonary Arterial Pressure Monitoring;  Surgeon: Maged Rolon MD;  Location: [facility] CATH LAB;  Service: Cardiology;  Laterality: Right;    LEFT HEART  CATHETERIZATION Left 2022    Procedure: Left heart cath;  Surgeon: Kee Magana MD;  Location: [facility] CATH;  Service: Cardiovascular;  Laterality: Left;    RIGHT HEART CATHETERIZATION Right 2022    Procedure: INSERTION, CATHETER, RIGHT HEART;  Surgeon: Kee Magana MD;  Location: [facility] CATH;  Service: Cardiovascular;  Laterality: Right;  + Shunt Run    RIGHT HEART CATHETERIZATION Right 2023    Procedure: INSERTION, CATHETER, RIGHT HEART;  Surgeon: Danny Clark MD;  Location: [facility] CATH LAB;  Service: Cardiology;  Laterality: Right;  with nitric oxide study    RIGHT HEART CATHETERIZATION Right 2023    Procedure: INSERTION, CATHETER, RIGHT HEART;  Surgeon: Maged Rolon MD;  Location: [facility] CATH LAB;  Service: Cardiology;  Laterality: Right;    RIGHT HEART CATHETERIZATION Right 2023    Procedure: INSERTION, CATHETER, RIGHT HEART;  Surgeon: Maged Rolon MD;  Location: [facility] CATH LAB;  Service: Cardiology;  Laterality: Right;    SELECTIVE UNILATERAL PULMONARY ANGIOGRAPHY  2023    Procedure: Pumonary angiography - unilateral;  Surgeon: Maged Rloon MD;  Location: [facility] CATH LAB;  Service: Cardiology;;    THYROIDECTOMY, PARTIAL Left        Social History     Socioeconomic History    Marital status:    Tobacco Use    Smoking status: Former     Current packs/day: 0.00     Average packs/day: 1 pack/day for 5.0 years (5.0 ttl pk-yrs)     Types: Cigarettes     Start date:      Quit date:      Years since quittin.5    Smokeless tobacco: Never   Substance and Sexual Activity    Alcohol use: Not Currently     Comment: Reports drinking wine, malt liquor, and daquiris in the past. Current alcohol use 1-2 drinks once every 3-4 months.    Drug use: Never    Sexual activity: Yes     Partners: Male     Birth control/protection: OCP     Social Determinants of Health     Financial Resource Strain: Low Risk  (2024)    Overall Financial Resource Strain (CARDIA)      Difficulty of Paying Living Expenses: Not hard at all   Food Insecurity: No Food Insecurity (1/23/2024)    Hunger Vital Sign     Worried About Running Out of Food in the Last Year: Never true     Ran Out of Food in the Last Year: Never true   Transportation Needs: No Transportation Needs (1/23/2024)    PRAPARE - Transportation     Lack of Transportation (Medical): No     Lack of Transportation (Non-Medical): No   Physical Activity: Insufficiently Active (1/23/2024)    Exercise Vital Sign     Days of Exercise per Week: 3 days     Minutes of Exercise per Session: 30 min   Stress: No Stress Concern Present (1/23/2024)    [place] citizen [place] [employer] - Occupational Stress Questionnaire     Feeling of Stress : Not at all   Housing Stability: Low Risk  (1/23/2024)    Housing Stability Vital Sign     Unable to Pay for Housing in the Last Year: No     Number of Places Lived in the Last Year: 1     Unstable Housing in the Last Year: No       OBJECTIVE:     Vital Signs Range (Last 24H):         Significant Labs:  Lab Results   Component Value Date    WBC 6.60 06/17/2024    HGB 14.1 06/17/2024    HCT 42.1 06/17/2024     06/17/2024    ALT 26 06/17/2024    AST 13 06/17/2024     06/17/2024    K 3.8 06/17/2024    CL 99 06/17/2024    CREATININE 0.9 06/17/2024    BUN 15 06/17/2024    CO2 26 06/17/2024    TSH 0.259 (L) 01/31/2024    INR 0.9 12/12/2023       Diagnostic Studies: No relevant studies.    EKG:   Results for orders placed or performed during the hospital encounter of 12/12/23   EKG 12-lead    Collection Time: 12/12/23  7:09 AM    Narrative    Test Reason : I27.21,Z00.6,    Vent. Rate : 073 BPM     Atrial Rate : 073 BPM     P-R Int : 132 ms          QRS Dur : 088 ms      QT Int : 418 ms       P-R-T Axes : -07 116 017 degrees     QTc Int : 460 ms    Normal sinus rhythm  Right axis deviation  Nonspecific T wave abnormality  Prolonged QT  Abnormal ECG  When compared with ECG of 14-NOV-2023  "07:22,  Inverted T waves have replaced nonspecific T wave abnormality in Anterior  leads  Confirmed by Emerald Arroyo MD ([id number]) on 12/12/2023 5:11:48 PM    Referred By: SULMA DUFF           Confirmed By:Emerald Arroyo MD       2D ECHO:  TTE:  Results for orders placed or performed during the hospital encounter of 06/18/24   Echo   Result Value Ref Range    BSA 1.83 m2    LVOT stroke volume 82.82 cm3    LVIDd 4.39 3.5 - 6.0 cm    LV Systolic Volume 22.80 mL    LV Systolic Volume Index 13.0 mL/m2    LVIDs 2.52 2.1 - 4.0 cm    LV Diastolic Volume 87.27 mL    LV Diastolic Volume Index 49.59 mL/m2    IVS 0.98 0.6 - 1.1 cm    LVOT diameter 1.92 cm    LVOT area 2.9 cm2    FS 43 28 - 44 %    Left Ventricle Relative Wall Thickness 0.36 cm    Posterior Wall 0.78 0.6 - 1.1 cm    LV mass 123.74 g    LV Mass Index 70 g/m2    MV Peak E Stevenson 0.90 m/s    TDI LATERAL 0.14 m/s    TDI SEPTAL 0.06 m/s    E/E' ratio 9.00 m/s    MV Peak A Stevenson 0.73 m/s    TR Max Stevenson 3.96 m/s    E/A ratio 1.23     IVRT 95.15 msec    E wave deceleration time 407.72 msec    MV "A" wave duration 11.70 msec    LV SEPTAL E/E' RATIO 15.00 m/s    LA Volume Index 23.3 mL/m2    LV LATERAL E/E' RATIO 6.43 m/s    LA volume 40.98 cm3    PV Peak S Stevenson 0.50 m/s    PV Peak D Stevenson 0.65 m/s    Pulm vein S/D ratio 0.77     LVOT peak stevenson 1.37 m/s    TAPSE 1.92 cm    RV/LV Ratio 1.06 cm    LA size 3.45 cm    Left Atrium Minor Axis 4.26 cm    Left Atrium Major Axis 4.26 cm    LA volume (mod) 31.92 cm3    LA WIDTH 3.28 cm    LA Volume Index (Mod) 18.1 mL/m2    RA Major Axis 4.72 cm    RA Width 3.32 cm    AV mean gradient 9 mmHg    AV peak gradient 17 mmHg    Ao peak stevenson 2.06 m/s    Ao VTI 43.90 cm    LVOT peak VTI 28.62 cm    AV valve area 1.89 cm²    AV Velocity Ratio 0.67     AV index (prosthetic) 0.65     KENZIE by Velocity Ratio 1.92 cm²    Triscuspid Valve Regurgitation Peak Gradient 63 mmHg    Sinus 2.33 cm    STJ 2.05 cm    Ascending aorta 2.60 cm    Mean e' 0.10 m/s    " ZLVIDS -1.41     ZLVIDD -1.03     RVDD 4.64 cm    RV- joseph basal diam 4.7 cm    RV GLS 17.6 %    RV free wall strain 17.7 %    TV resting pulmonary artery pressure 66 mmHg    RV TB RVSP 7 mmHg    Est. RA pres 3 mmHg    Narrative      Left Ventricle: The left ventricle is normal in size. Normal wall   thickness. Normal wall motion. Septal flattening in systole consistent   with right ventricular pressure overload. There is normal systolic   function with a visually estimated ejection fraction of 55 - 60%. There is   normal diastolic function.    Right Ventricle: Mild right ventricular enlargement. Systolic function   is mildly reduced. Right ventricular global longitudinal strain is -   17.6%. Free wall strain 17.7%.    Aortic Valve: There is mild aortic regurgitation.    Mitral Valve: There is mild regurgitation.    Tricuspid Valve: There is mild to moderate regurgitation.    Pulmonary Artery: The estimated pulmonary artery systolic pressure is   66 mmHg.    IVC/SVC: Normal venous pressure at 3 mmHg.         MARK:  No results found for this or any previous visit.    ASSESSMENT/PLAN:          Pre-op Assessment    I have reviewed the Patient Summary Reports.     I have reviewed the Nursing Notes. I have reviewed the NPO Status.   I have reviewed the Medications.     Review of Systems  Anesthesia Hx:  No problems with previous Anesthesia   History of prior surgery of interest to airway management or planning:             Social:  No Alcohol Use, Former Smoker       Hematology/Oncology:       -- Denies Anemia:                  Denies Current/Recent Cancer                EENT/Dental:         Denies Otitis Media        Cardiovascular:      Denies Hypertension. Valvular problems/Murmurs (Tricuspid regurgitation)   Denies CAD.       CHF (Mild right heart failure)    no hyperlipidemia                             Pulmonary:    Denies COPD.      Severe pulmonary HTN               Renal/:   Denies Chronic Renal Disease.                 Hepatic/GI:      Denies GERD.             Musculoskeletal:  Denies Arthritis.               Neurological:    Denies CVA.              Peripheral Neuropathy   Denies Dementia                         Endocrine:  Denies Diabetes. Hypothyroidism        Obesity / BMI > 30  Psych:  Denies Psychiatric History.                  Physical Exam  General: Well nourished, Cooperative and Alert    Airway:  Mallampati: II / I  Mouth Opening: Normal  TM Distance: Normal  Tongue: Normal  Neck ROM: Normal ROM    Dental:  Intact    Chest/Lungs:  Clear to auscultation, Normal Respiratory Rate    Heart:  Rate: Normal  Rhythm: Regular Rhythm  Sounds: Normal        Anesthesia Plan  Type of Anesthesia, risks & benefits discussed:    Anesthesia Type: Gen ETT  Intra-op Monitoring Plan: Standard ASA Monitors and Art Line  Post Op Pain Control Plan: multimodal analgesia and IV/PO Opioids PRN  Induction:  IV  Airway Plan: Direct, Post-Induction  Informed Consent: Informed consent signed with the Patient and all parties understand the risks and agree with anesthesia plan.  All questions answered.   ASA Score: 3  Day of Surgery Review of History & Physical: H&P Update referred to the surgeon/provider.    Ready For Surgery From Anesthesia Perspective.     .

## 2024-07-11 ENCOUNTER — TELEPHONE (OUTPATIENT)
Dept: TRANSPLANT | Facility: CLINIC | Age: 34
End: 2024-07-11
Payer: COMMERCIAL

## 2024-07-11 ENCOUNTER — RESEARCH ENCOUNTER (OUTPATIENT)
Dept: RESEARCH | Facility: HOSPITAL | Age: 34
End: 2024-07-11
Payer: COMMERCIAL

## 2024-07-11 ENCOUNTER — HOSPITAL ENCOUNTER (OUTPATIENT)
Facility: HOSPITAL | Age: 34
LOS: 1 days | Discharge: HOME | End: 2024-07-12
Attending: OBSTETRICS & GYNECOLOGY | Admitting: OBSTETRICS & GYNECOLOGY
Payer: COMMERCIAL

## 2024-07-11 ENCOUNTER — ANESTHESIA (OUTPATIENT)
Dept: SURGERY | Facility: HOSPITAL | Age: 34
End: 2024-07-11
Payer: COMMERCIAL

## 2024-07-11 DIAGNOSIS — I50.33 ACUTE ON CHRONIC CONGESTIVE HEART FAILURE WITH RIGHT VENTRICULAR DIASTOLIC DYSFUNCTION: ICD-10-CM

## 2024-07-11 DIAGNOSIS — I27.9 CHRONIC PULMONARY HEART DISEASE: ICD-10-CM

## 2024-07-11 DIAGNOSIS — I27.21 WHO GROUP 1 PULMONARY ARTERIAL HYPERTENSION: ICD-10-CM

## 2024-07-11 DIAGNOSIS — I07.1 SEVERE TRICUSPID REGURGITATION: ICD-10-CM

## 2024-07-11 DIAGNOSIS — I27.20 PULMONARY HYPERTENSION: ICD-10-CM

## 2024-07-11 DIAGNOSIS — I50.82 ACUTE ON CHRONIC CONGESTIVE HEART FAILURE WITH RIGHT VENTRICULAR DIASTOLIC DYSFUNCTION: ICD-10-CM

## 2024-07-11 DIAGNOSIS — Z98.890 S/P ROBOT-ASSISTED SURGICAL PROCEDURE: Primary | ICD-10-CM

## 2024-07-11 DIAGNOSIS — N94.89 ADNEXAL MASS: ICD-10-CM

## 2024-07-11 LAB
B-HCG UR QL: NEGATIVE
CTP QC/QA: YES
SARS-COV-2 RDRP RESP QL NAA+PROBE: NEGATIVE

## 2024-07-11 PROCEDURE — 88305 TISSUE EXAM BY PATHOLOGIST: CPT | Mod: 26,,, | Performed by: PATHOLOGY

## 2024-07-11 PROCEDURE — 37000009 HC ANESTHESIA EA ADD 15 MINS: Performed by: OBSTETRICS & GYNECOLOGY

## 2024-07-11 PROCEDURE — 88112 CYTOPATH CELL ENHANCE TECH: CPT | Performed by: PATHOLOGY

## 2024-07-11 PROCEDURE — 25000003 PHARM REV CODE 250

## 2024-07-11 PROCEDURE — 71000033 HC RECOVERY, INTIAL HOUR: Performed by: OBSTETRICS & GYNECOLOGY

## 2024-07-11 PROCEDURE — 88307 TISSUE EXAM BY PATHOLOGIST: CPT | Mod: 59 | Performed by: PATHOLOGY

## 2024-07-11 PROCEDURE — 63600175 PHARM REV CODE 636 W HCPCS

## 2024-07-11 PROCEDURE — 88342 IMHCHEM/IMCYTCHM 1ST ANTB: CPT | Performed by: PATHOLOGY

## 2024-07-11 PROCEDURE — 63600175 PHARM REV CODE 636 W HCPCS: Performed by: OBSTETRICS & GYNECOLOGY

## 2024-07-11 PROCEDURE — 63600175 PHARM REV CODE 636 W HCPCS: Mod: JG

## 2024-07-11 PROCEDURE — 71000016 HC POSTOP RECOV ADDL HR: Performed by: OBSTETRICS & GYNECOLOGY

## 2024-07-11 PROCEDURE — 88112 CYTOPATH CELL ENHANCE TECH: CPT | Mod: 26,,, | Performed by: PATHOLOGY

## 2024-07-11 PROCEDURE — U0002 COVID-19 LAB TEST NON-CDC: HCPCS | Performed by: OBSTETRICS & GYNECOLOGY

## 2024-07-11 PROCEDURE — 27201423 OPTIME MED/SURG SUP & DEVICES STERILE SUPPLY: Performed by: OBSTETRICS & GYNECOLOGY

## 2024-07-11 PROCEDURE — 25000003 PHARM REV CODE 250: Performed by: OBSTETRICS & GYNECOLOGY

## 2024-07-11 PROCEDURE — 88307 TISSUE EXAM BY PATHOLOGIST: CPT | Mod: 26,,, | Performed by: PATHOLOGY

## 2024-07-11 PROCEDURE — 58571 TLH W/T/O 250 G OR LESS: CPT | Mod: 22,,, | Performed by: OBSTETRICS & GYNECOLOGY

## 2024-07-11 PROCEDURE — 36000712 HC OR TIME LEV V 1ST 15 MIN: Performed by: OBSTETRICS & GYNECOLOGY

## 2024-07-11 PROCEDURE — 37000008 HC ANESTHESIA 1ST 15 MINUTES: Performed by: OBSTETRICS & GYNECOLOGY

## 2024-07-11 PROCEDURE — 88305 TISSUE EXAM BY PATHOLOGIST: CPT | Performed by: PATHOLOGY

## 2024-07-11 PROCEDURE — 81025 URINE PREGNANCY TEST: CPT | Performed by: OBSTETRICS & GYNECOLOGY

## 2024-07-11 PROCEDURE — 94761 N-INVAS EAR/PLS OXIMETRY MLT: CPT

## 2024-07-11 PROCEDURE — 63600175 PHARM REV CODE 636 W HCPCS: Mod: JZ,JG | Performed by: OBSTETRICS & GYNECOLOGY

## 2024-07-11 PROCEDURE — 36000713 HC OR TIME LEV V EA ADD 15 MIN: Performed by: OBSTETRICS & GYNECOLOGY

## 2024-07-11 PROCEDURE — 88341 IMHCHEM/IMCYTCHM EA ADD ANTB: CPT | Mod: 26,,, | Performed by: PATHOLOGY

## 2024-07-11 PROCEDURE — 27201037 HC PRESSURE MONITORING SET UP

## 2024-07-11 PROCEDURE — 88341 IMHCHEM/IMCYTCHM EA ADD ANTB: CPT | Performed by: PATHOLOGY

## 2024-07-11 PROCEDURE — 88342 IMHCHEM/IMCYTCHM 1ST ANTB: CPT | Mod: 26,,, | Performed by: PATHOLOGY

## 2024-07-11 PROCEDURE — 58571 TLH W/T/O 250 G OR LESS: CPT | Mod: AS,22,, | Performed by: PHYSICIAN ASSISTANT

## 2024-07-11 PROCEDURE — 71000015 HC POSTOP RECOV 1ST HR: Performed by: OBSTETRICS & GYNECOLOGY

## 2024-07-11 RX ORDER — DEXMEDETOMIDINE HYDROCHLORIDE 100 UG/ML
INJECTION, SOLUTION INTRAVENOUS
Status: DISCONTINUED | OUTPATIENT
Start: 2024-07-11 | End: 2024-07-11

## 2024-07-11 RX ORDER — PROPOFOL 10 MG/ML
VIAL (ML) INTRAVENOUS
Status: DISCONTINUED | OUTPATIENT
Start: 2024-07-11 | End: 2024-07-11

## 2024-07-11 RX ORDER — ACETAMINOPHEN 500 MG
1000 TABLET ORAL EVERY 6 HOURS
Status: DISCONTINUED | OUTPATIENT
Start: 2024-07-11 | End: 2024-07-12 | Stop reason: HOSPADM

## 2024-07-11 RX ORDER — FENTANYL CITRATE 50 UG/ML
INJECTION, SOLUTION INTRAMUSCULAR; INTRAVENOUS
Status: DISCONTINUED | OUTPATIENT
Start: 2024-07-11 | End: 2024-07-11

## 2024-07-11 RX ORDER — HYDROMORPHONE HYDROCHLORIDE 1 MG/ML
0.2 INJECTION, SOLUTION INTRAMUSCULAR; INTRAVENOUS; SUBCUTANEOUS EVERY 5 MIN PRN
Status: DISCONTINUED | OUTPATIENT
Start: 2024-07-11 | End: 2024-07-11 | Stop reason: HOSPADM

## 2024-07-11 RX ORDER — PROCHLORPERAZINE EDISYLATE 5 MG/ML
5 INJECTION INTRAMUSCULAR; INTRAVENOUS EVERY 6 HOURS PRN
Status: DISCONTINUED | OUTPATIENT
Start: 2024-07-11 | End: 2024-07-12 | Stop reason: HOSPADM

## 2024-07-11 RX ORDER — HYDROXYZINE PAMOATE 25 MG/1
25 CAPSULE ORAL 4 TIMES DAILY
Status: DISCONTINUED | OUTPATIENT
Start: 2024-07-11 | End: 2024-07-11

## 2024-07-11 RX ORDER — FUROSEMIDE 20 MG/1
40 TABLET ORAL DAILY
Status: CANCELLED | OUTPATIENT
Start: 2024-07-12

## 2024-07-11 RX ORDER — ONDANSETRON HYDROCHLORIDE 2 MG/ML
4 INJECTION, SOLUTION INTRAVENOUS EVERY 6 HOURS PRN
Status: DISCONTINUED | OUTPATIENT
Start: 2024-07-11 | End: 2024-07-12 | Stop reason: HOSPADM

## 2024-07-11 RX ORDER — HYDROMORPHONE HYDROCHLORIDE 1 MG/ML
0.4 INJECTION, SOLUTION INTRAMUSCULAR; INTRAVENOUS; SUBCUTANEOUS
Status: DISCONTINUED | OUTPATIENT
Start: 2024-07-11 | End: 2024-07-12 | Stop reason: HOSPADM

## 2024-07-11 RX ORDER — ROCURONIUM BROMIDE 10 MG/ML
INJECTION, SOLUTION INTRAVENOUS
Status: DISCONTINUED | OUTPATIENT
Start: 2024-07-11 | End: 2024-07-11

## 2024-07-11 RX ORDER — MIDAZOLAM HYDROCHLORIDE 5 MG/ML
INJECTION INTRAMUSCULAR; INTRAVENOUS
Status: DISCONTINUED | OUTPATIENT
Start: 2024-07-11 | End: 2024-07-11

## 2024-07-11 RX ORDER — TADALAFIL 20 MG/1
40 TABLET ORAL DAILY
Status: CANCELLED | OUTPATIENT
Start: 2024-07-12

## 2024-07-11 RX ORDER — VASOPRESSIN 20 [USP'U]/ML
INJECTION, SOLUTION INTRAMUSCULAR; SUBCUTANEOUS
Status: DISCONTINUED | OUTPATIENT
Start: 2024-07-11 | End: 2024-07-11

## 2024-07-11 RX ORDER — SODIUM CHLORIDE 0.9 % (FLUSH) 0.9 %
10 SYRINGE (ML) INJECTION
Status: CANCELLED | OUTPATIENT
Start: 2024-07-11

## 2024-07-11 RX ORDER — OXYCODONE HYDROCHLORIDE 10 MG/1
10 TABLET ORAL EVERY 4 HOURS PRN
Status: DISCONTINUED | OUTPATIENT
Start: 2024-07-11 | End: 2024-07-12 | Stop reason: HOSPADM

## 2024-07-11 RX ORDER — LIDOCAINE HYDROCHLORIDE 20 MG/ML
INJECTION, SOLUTION EPIDURAL; INFILTRATION; INTRACAUDAL; PERINEURAL
Status: DISCONTINUED | OUTPATIENT
Start: 2024-07-11 | End: 2024-07-11

## 2024-07-11 RX ORDER — SODIUM CHLORIDE, SODIUM LACTATE, POTASSIUM CHLORIDE, CALCIUM CHLORIDE 600; 310; 30; 20 MG/100ML; MG/100ML; MG/100ML; MG/100ML
INJECTION, SOLUTION INTRAVENOUS CONTINUOUS
Status: DISCONTINUED | OUTPATIENT
Start: 2024-07-11 | End: 2024-07-12

## 2024-07-11 RX ORDER — ONDANSETRON HYDROCHLORIDE 2 MG/ML
INJECTION, SOLUTION INTRAVENOUS
Status: DISCONTINUED | OUTPATIENT
Start: 2024-07-11 | End: 2024-07-11

## 2024-07-11 RX ORDER — HYDROXYZINE PAMOATE 25 MG/1
25 CAPSULE ORAL EVERY 8 HOURS PRN
Status: CANCELLED | OUTPATIENT
Start: 2024-07-11

## 2024-07-11 RX ORDER — PREGABALIN 50 MG/1
100 CAPSULE ORAL NIGHTLY
Status: DISCONTINUED | OUTPATIENT
Start: 2024-07-11 | End: 2024-07-12 | Stop reason: HOSPADM

## 2024-07-11 RX ORDER — NALOXONE HCL 0.4 MG/ML
0.02 VIAL (ML) INJECTION
Status: DISCONTINUED | OUTPATIENT
Start: 2024-07-11 | End: 2024-07-12 | Stop reason: HOSPADM

## 2024-07-11 RX ORDER — GLUCAGON 1 MG
1 KIT INJECTION
Status: DISCONTINUED | OUTPATIENT
Start: 2024-07-11 | End: 2024-07-12 | Stop reason: HOSPADM

## 2024-07-11 RX ORDER — ACETAMINOPHEN 500 MG
1000 TABLET ORAL
Status: DISCONTINUED | OUTPATIENT
Start: 2024-07-11 | End: 2024-07-11

## 2024-07-11 RX ORDER — MUPIROCIN 20 MG/G
OINTMENT TOPICAL 2 TIMES DAILY
Status: DISCONTINUED | OUTPATIENT
Start: 2024-07-11 | End: 2024-07-12 | Stop reason: HOSPADM

## 2024-07-11 RX ORDER — HALOPERIDOL 5 MG/ML
0.5 INJECTION INTRAMUSCULAR EVERY 10 MIN PRN
Status: DISCONTINUED | OUTPATIENT
Start: 2024-07-11 | End: 2024-07-11 | Stop reason: HOSPADM

## 2024-07-11 RX ORDER — BUPIVACAINE HYDROCHLORIDE 2.5 MG/ML
INJECTION, SOLUTION EPIDURAL; INFILTRATION; INTRACAUDAL
Status: DISCONTINUED | OUTPATIENT
Start: 2024-07-11 | End: 2024-07-11 | Stop reason: HOSPADM

## 2024-07-11 RX ORDER — HEPARIN SODIUM 5000 [USP'U]/ML
5000 INJECTION, SOLUTION INTRAVENOUS; SUBCUTANEOUS
Status: DISCONTINUED | OUTPATIENT
Start: 2024-07-11 | End: 2024-07-11 | Stop reason: HOSPADM

## 2024-07-11 RX ORDER — SENNOSIDES 8.6 MG/1
8.6 TABLET ORAL 2 TIMES DAILY
Status: DISCONTINUED | OUTPATIENT
Start: 2024-07-11 | End: 2024-07-12 | Stop reason: HOSPADM

## 2024-07-11 RX ORDER — DEXAMETHASONE SODIUM PHOSPHATE 4 MG/ML
INJECTION, SOLUTION INTRA-ARTICULAR; INTRALESIONAL; INTRAMUSCULAR; INTRAVENOUS; SOFT TISSUE
Status: DISCONTINUED | OUTPATIENT
Start: 2024-07-11 | End: 2024-07-11

## 2024-07-11 RX ORDER — LIDOCAINE HYDROCHLORIDE 10 MG/ML
1 INJECTION, SOLUTION EPIDURAL; INFILTRATION; INTRACAUDAL; PERINEURAL ONCE
Status: DISCONTINUED | OUTPATIENT
Start: 2024-07-11 | End: 2024-07-11 | Stop reason: HOSPADM

## 2024-07-11 RX ORDER — KETOROLAC TROMETHAMINE 15 MG/ML
15 INJECTION, SOLUTION INTRAMUSCULAR; INTRAVENOUS EVERY 6 HOURS
Status: DISCONTINUED | OUTPATIENT
Start: 2024-07-11 | End: 2024-07-12 | Stop reason: HOSPADM

## 2024-07-11 RX ORDER — IBUPROFEN 200 MG
200 TABLET ORAL EVERY 6 HOURS
Status: DISCONTINUED | OUTPATIENT
Start: 2024-07-12 | End: 2024-07-12 | Stop reason: HOSPADM

## 2024-07-11 RX ORDER — SPIRONOLACTONE 25 MG/1
25 TABLET ORAL DAILY
Status: CANCELLED | OUTPATIENT
Start: 2024-07-12

## 2024-07-11 RX ORDER — ZOLPIDEM TARTRATE 5 MG/1
5 TABLET ORAL NIGHTLY PRN
Status: DISCONTINUED | OUTPATIENT
Start: 2024-07-11 | End: 2024-07-12 | Stop reason: HOSPADM

## 2024-07-11 RX ORDER — SODIUM CHLORIDE 0.9 % (FLUSH) 0.9 %
10 SYRINGE (ML) INJECTION
Status: DISCONTINUED | OUTPATIENT
Start: 2024-07-11 | End: 2024-07-11 | Stop reason: HOSPADM

## 2024-07-11 RX ORDER — ACETAMINOPHEN 500 MG
1000 TABLET ORAL ONCE
Status: COMPLETED | OUTPATIENT
Start: 2024-07-11 | End: 2024-07-11

## 2024-07-11 RX ORDER — CELECOXIB 200 MG/1
200 CAPSULE ORAL ONCE
Status: COMPLETED | OUTPATIENT
Start: 2024-07-11 | End: 2024-07-11

## 2024-07-11 RX ORDER — OXYCODONE HYDROCHLORIDE 5 MG/1
5 TABLET ORAL EVERY 4 HOURS PRN
Status: DISCONTINUED | OUTPATIENT
Start: 2024-07-11 | End: 2024-07-12 | Stop reason: HOSPADM

## 2024-07-11 RX ORDER — ADHESIVE BANDAGE
30 BANDAGE TOPICAL 2 TIMES DAILY
Status: DISCONTINUED | OUTPATIENT
Start: 2024-07-11 | End: 2024-07-12 | Stop reason: HOSPADM

## 2024-07-11 RX ORDER — CLINDAMYCIN PHOSPHATE 900 MG/50ML
900 INJECTION, SOLUTION INTRAVENOUS
Status: COMPLETED | OUTPATIENT
Start: 2024-07-11 | End: 2024-07-11

## 2024-07-11 RX ADMIN — SODIUM CHLORIDE, SODIUM GLUCONATE, SODIUM ACETATE, POTASSIUM CHLORIDE, MAGNESIUM CHLORIDE, SODIUM PHOSPHATE, DIBASIC, AND POTASSIUM PHOSPHATE: .53; .5; .37; .037; .03; .012; .00082 INJECTION, SOLUTION INTRAVENOUS at 02:07

## 2024-07-11 RX ADMIN — SODIUM CHLORIDE, POTASSIUM CHLORIDE, SODIUM LACTATE AND CALCIUM CHLORIDE: 600; 310; 30; 20 INJECTION, SOLUTION INTRAVENOUS at 06:07

## 2024-07-11 RX ADMIN — HYDROMORPHONE HYDROCHLORIDE 0.2 MG: 1 INJECTION, SOLUTION INTRAMUSCULAR; INTRAVENOUS; SUBCUTANEOUS at 04:07

## 2024-07-11 RX ADMIN — ACETAMINOPHEN 1000 MG: 500 TABLET ORAL at 11:07

## 2024-07-11 RX ADMIN — CLINDAMYCIN IN 5 PERCENT DEXTROSE 900 MG: 18 INJECTION, SOLUTION INTRAVENOUS at 11:07

## 2024-07-11 RX ADMIN — FENTANYL CITRATE 50 MCG: 50 INJECTION INTRAMUSCULAR; INTRAVENOUS at 01:07

## 2024-07-11 RX ADMIN — DEXAMETHASONE SODIUM PHOSPHATE 4 MG: 4 INJECTION INTRA-ARTICULAR; INTRALESIONAL; INTRAMUSCULAR; INTRAVENOUS; SOFT TISSUE at 12:07

## 2024-07-11 RX ADMIN — ACETAMINOPHEN 1000 MG: 500 TABLET ORAL at 05:07

## 2024-07-11 RX ADMIN — ROCURONIUM BROMIDE 30 MG: 10 INJECTION INTRAVENOUS at 01:07

## 2024-07-11 RX ADMIN — VASOPRESSIN 1 UNITS: 20 INJECTION INTRAVENOUS at 12:07

## 2024-07-11 RX ADMIN — MAGNESIUM HYDROXIDE 2400 MG: 400 SUSPENSION ORAL at 08:07

## 2024-07-11 RX ADMIN — DEXMEDETOMIDINE 8 MCG: 100 INJECTION, SOLUTION, CONCENTRATE INTRAVENOUS at 02:07

## 2024-07-11 RX ADMIN — CELECOXIB 200 MG: 200 CAPSULE ORAL at 08:07

## 2024-07-11 RX ADMIN — ROCURONIUM BROMIDE 100 MG: 10 INJECTION INTRAVENOUS at 11:07

## 2024-07-11 RX ADMIN — MIDAZOLAM 2 MG: 5 INJECTION INTRAMUSCULAR; INTRAVENOUS at 11:07

## 2024-07-11 RX ADMIN — OXYCODONE HYDROCHLORIDE 10 MG: 10 TABLET ORAL at 04:07

## 2024-07-11 RX ADMIN — SODIUM CHLORIDE: 0.9 INJECTION, SOLUTION INTRAVENOUS at 10:07

## 2024-07-11 RX ADMIN — PREGABALIN 100 MG: 50 CAPSULE ORAL at 08:07

## 2024-07-11 RX ADMIN — ONDANSETRON 4 MG: 2 INJECTION INTRAMUSCULAR; INTRAVENOUS at 02:07

## 2024-07-11 RX ADMIN — HEPARIN SODIUM 5000 UNITS: 5000 INJECTION INTRAVENOUS; SUBCUTANEOUS at 09:07

## 2024-07-11 RX ADMIN — MUPIROCIN: 20 OINTMENT TOPICAL at 08:07

## 2024-07-11 RX ADMIN — ROCURONIUM BROMIDE 30 MG: 10 INJECTION INTRAVENOUS at 02:07

## 2024-07-11 RX ADMIN — KETOROLAC TROMETHAMINE 15 MG: 15 INJECTION, SOLUTION INTRAMUSCULAR; INTRAVENOUS at 05:07

## 2024-07-11 RX ADMIN — VASOPRESSIN 2 UNITS: 20 INJECTION INTRAVENOUS at 12:07

## 2024-07-11 RX ADMIN — KETOROLAC TROMETHAMINE 15 MG: 15 INJECTION, SOLUTION INTRAMUSCULAR; INTRAVENOUS at 11:07

## 2024-07-11 RX ADMIN — ROCURONIUM BROMIDE 30 MG: 10 INJECTION INTRAVENOUS at 12:07

## 2024-07-11 RX ADMIN — SODIUM CHLORIDE, SODIUM GLUCONATE, SODIUM ACETATE, POTASSIUM CHLORIDE, MAGNESIUM CHLORIDE, SODIUM PHOSPHATE, DIBASIC, AND POTASSIUM PHOSPHATE: .53; .5; .37; .037; .03; .012; .00082 INJECTION, SOLUTION INTRAVENOUS at 11:07

## 2024-07-11 RX ADMIN — ACETAMINOPHEN 1000 MG: 500 TABLET ORAL at 09:07

## 2024-07-11 RX ADMIN — FENTANYL CITRATE 100 MCG: 50 INJECTION INTRAMUSCULAR; INTRAVENOUS at 11:07

## 2024-07-11 RX ADMIN — LIDOCAINE HYDROCHLORIDE 100 MG: 20 INJECTION, SOLUTION EPIDURAL; INFILTRATION; INTRACAUDAL at 11:07

## 2024-07-11 RX ADMIN — PROPOFOL 150 MG: 10 INJECTION, EMULSION INTRAVENOUS at 11:07

## 2024-07-11 RX ADMIN — SENNOSIDES 8.6 MG: 8.6 TABLET, FILM COATED ORAL at 08:07

## 2024-07-11 RX ADMIN — GENTAMICIN SULFATE 296.4 MG: 40 INJECTION, SOLUTION INTRAMUSCULAR; INTRAVENOUS at 11:07

## 2024-07-11 RX ADMIN — DEXMEDETOMIDINE 12 MCG: 100 INJECTION, SOLUTION, CONCENTRATE INTRAVENOUS at 02:07

## 2024-07-11 RX ADMIN — VASOPRESSIN 1 UNITS: 20 INJECTION INTRAVENOUS at 11:07

## 2024-07-11 NOTE — HPI
Ms. Sarmiento is a very pleasant 33 y.o. year old white female with WHO group 1 PH on a stable dose of SQ Remodulin (40 ng/kg/min) and Opsumit 10 mg daily who is admitted after robotic hysterectomy and BL oophorectomy. Admitted to observation post operatively

## 2024-07-11 NOTE — TREATMENT PLAN
Patient is on SQ REMODULIN infusion managed by HTS    If admitting to IP setting     If ICU is needed ---Manjindern needs to admit to CICU 3WT    If stepdown--Please admit to TSU

## 2024-07-11 NOTE — PROGRESS NOTES
Pt arrived to U [id number] from PACU, s/p hysterectomy. See flowsheets for vitals. IVF per MAR. Lap sites C/D/I. Subq remodulin infusing on home pump. Tele applied. Pt assisted to toilet, standby-assist. Oriented to room, call bell, and staff. Bed locked/lowest position, nonskid socks on, call bell within reach. She verbalized understanding to call for needs/assistance. AAOx4.     Nurses Note -- 4 Eyes      7/11/2024   6:31 PM      Skin assessed during: Transfer      [] No Altered Skin Integrity Present    [x]Prevention Measures Documented      [x] Yes- Altered Skin Integrity Present or Discovered   [] LDA Added if Not in Epic (Describe Wound)   [] New Altered Skin Integrity was Present on Admit and Documented in LDA   [] Wound Image Taken    Wound Care Consulted? No    Attending Nurse:  Sandra Bey RN/Staff Member:   THEA lo RN

## 2024-07-11 NOTE — NURSING TRANSFER
Nursing Transfer Note      7/11/2024   6:29 PM    Nurse giving handoff:SANDRA Castaneda RN   Nurse receiving handoff:CLARK Knowles RN     Reason patient is being transferred: post procedure     Transfer To: [id number]    Transfer via stretcher    Transported by RN and transport     Medicines sent: yes     Any special needs or follow-up needed: home IV Romodulin     Chart send with patient: Yes    Notified: spouse    Patient reassessed at: 1815 on 7/11

## 2024-07-11 NOTE — TRANSFER OF CARE
Anesthesia Transfer of Care Note    Patient: Mya Sarmiento    Procedure(s) Performed: Procedure(s) (LRB):  XI ROBOTIC HYSTERECTOMY (N/A)  ROBOTIC OOPHORECTOMY (Bilateral)    Patient location: PACU    Anesthesia Type: general    Transport from OR: Transported from OR on 6-10 L/min O2 by face mask with adequate spontaneous ventilation    Post pain: adequate analgesia    Post assessment: no apparent anesthetic complications    Post vital signs: stable    Level of consciousness: awake    Nausea/Vomiting: no nausea/vomiting    Complications: none    Transfer of care protocol was followed      Last vitals: Visit Vitals  /66   Pulse 70   Temp 36.8 °C (98.2 °F) (Oral)   Resp 18   Ht 5' (1.524 m)   Wt 81.2 kg (179 lb)   LMP 06/15/2024 (Approximate)   SpO2 97%   Breastfeeding No   BMI 34.96 kg/m²

## 2024-07-11 NOTE — SUBJECTIVE & OBJECTIVE
Past Medical History:   Diagnosis Date    BMI 32.0-32.9,adult 11/9/2022    Migraine headache 2000    Ongoing    Morbidly obese     Unknown start date; lost over 80 pounds between 0781-6014    Neuropathy 2019    Bilateral feet; ongoing    Ovarian cyst 2017    Bilaterally; ongoing    Pulmonary hypertension 12/01/2022    Ongoing    Severe tricuspid regurgitation 11/09/2022    Resolved 6/28/2023 per echo    Thyroid disease 2013    Ongoing (S/P left partial thyroidectomy)    Vaginal delivery 12/11/2012       Past Surgical History:   Procedure Laterality Date    INSERTION, WIRELESS SENSOR, FOR PULMONARY ARTERIAL PRESSURE MONITORING Right 8/18/2023    Procedure: Insertion, Wireless Sensor, For Pulmonary Arterial Pressure Monitoring;  Surgeon: Maged Rolon MD;  Location: [facility] CATH LAB;  Service: Cardiology;  Laterality: Right;    LEFT HEART CATHETERIZATION Left 12/01/2022    Procedure: Left heart cath;  Surgeon: Kee Magana MD;  Location: [facility] CATH;  Service: Cardiovascular;  Laterality: Left;    RIGHT HEART CATHETERIZATION Right 12/01/2022    Procedure: INSERTION, CATHETER, RIGHT HEART;  Surgeon: Kee Magana MD;  Location: [facility] CATH;  Service: Cardiovascular;  Laterality: Right;  + [place]unt Run    RIGHT HEART CATHETERIZATION Right 02/01/2023    Procedure: INSERTION, CATHETER, RIGHT HEART;  Surgeon: Danny Clark MD;  Location: [facility] CATH LAB;  Service: Cardiology;  Laterality: Right;  with nitric oxide study    RIGHT HEART CATHETERIZATION Right 8/18/2023    Procedure: INSERTION, CATHETER, RIGHT HEART;  Surgeon: Maged Rolon MD;  Location: [facility] CATH LAB;  Service: Cardiology;  Laterality: Right;    RIGHT HEART CATHETERIZATION Right 12/12/2023    Procedure: INSERTION, CATHETER, RIGHT HEART;  Surgeon: Maged Rolon MD;  Location: [facility] CATH LAB;  Service: Cardiology;  Laterality: Right;    SELECTIVE UNILATERAL PULMONARY ANGIOGRAPHY  8/18/2023    Procedure: Pumonary angiography - unilateral;  Surgeon: Gonzales  Maged RUSSELL MD;  Location: [facility] CATH LAB;  Service: Cardiology;;    THYROIDECTOMY, PARTIAL Left        Review of patient's allergies indicates:   Allergen Reactions    Amoxicillin Other (See Comments)     Weak, sick, jaw tightens    Penicillins      Weak, sick, jaw tightens       Current Facility-Administered Medications   Medication    acetaminophen tablet 1,000 mg    dextrose 10% bolus 125 mL 125 mL    dextrose 10% bolus 250 mL 250 mL    glucagon (human recombinant) injection 1 mg    haloperidol lactate injection 0.5 mg    heparin (porcine) injection 5,000 Units    HYDROmorphone injection 0.2 mg    HYDROmorphone injection 0.4 mg    hydrOXYzine pamoate capsule 25 mg    ketorolac injection 15 mg    Followed by    [START ON 2024] ibuprofen tablet 200 mg    lactated ringers infusion    [START ON 2024] levothyroxine tablet 175 mcg    LIDOcaine (PF) 10 mg/ml (1%) injection 10 mg    magnesium hydroxide 400 mg/5 ml suspension 2,400 mg    mupirocin 2 % ointment    naloxone 0.4 mg/mL injection 0.02 mg    ondansetron injection 4 mg    oxyCODONE immediate release tablet 5 mg    oxyCODONE immediate release tablet Tab 10 mg    pregabalin capsule 100 mg    prochlorperazine injection Soln 5 mg    senna tablet 8.6 mg    sodium chloride 0.9% flush 10 mL    treprostinil 10 mg/mL vial    zolpidem tablet 5 mg     Family History       Problem Relation (Age of Onset)    Cancer Maternal Grandfather    No Known Problems Mother, Father    Ovarian cancer Maternal Grandmother          Tobacco Use    Smoking status: Former     Current packs/day: 0.00     Average packs/day: 1 pack/day for 5.0 years (5.0 ttl pk-yrs)     Types: Cigarettes     Start date:      Quit date:      Years since quittin.5    Smokeless tobacco: Never   Substance and Sexual Activity    Alcohol use: Not Currently     Comment: Reports drinking wine, malt liquor, and daquiris in the past. Current alcohol use 1-2 drinks once every 3-4 months.    Drug  "use: Never    Sexual activity: Yes     Partners: Male     Birth control/protection: OCP     Review of Systems   Constitutional:  Negative for chills and fever.   HENT:  Negative for congestion.    Eyes:  Negative for visual disturbance.   Respiratory:  Negative for shortness of breath.    Gastrointestinal:  Negative for abdominal pain, diarrhea and nausea.   Genitourinary:  Negative for dysuria.   Musculoskeletal:  Negative for back pain.   Neurological:  Negative for dizziness.   Psychiatric/Behavioral:  Negative for confusion.      Objective:     Vital Signs (Most Recent):  Temp: 97.9 °F (36.6 °C) (07/11/24 1515)  Pulse: 65 (07/11/24 1629)  Resp: (!) 22 (07/11/24 1629)  BP: (!) 101/55 (07/11/24 1530)  SpO2: (!) 94 % (07/11/24 1629) Vital Signs (24h Range):  Temp:  [97.9 °F (36.6 °C)-98.2 °F (36.8 °C)] 97.9 °F (36.6 °C)  Pulse:  [65-79] 65  Resp:  [14-22] 22  SpO2:  [94 %-100 %] 94 %  BP: (101-127)/(55-66) 101/55     Patient Vitals for the past 72 hrs (Last 3 readings):   Weight   07/11/24 0836 81.2 kg (179 lb)     Body mass index is 34.96 kg/m².      Intake/Output Summary (Last 24 hours) at 7/11/2024 1633  Last data filed at 7/11/2024 1442  Gross per 24 hour   Intake 1600 ml   Output 100 ml   Net 1500 ml          Physical Exam  Vitals reviewed.   HENT:      Head: Normocephalic.      Nose: Nose normal.   Eyes:      Conjunctiva/sclera: Conjunctivae normal.   Cardiovascular:      Rate and Rhythm: Normal rate and regular rhythm.   Pulmonary:      Effort: Pulmonary effort is normal.   Musculoskeletal:         General: Normal range of motion.      Cervical back: Normal range of motion.   Skin:     General: Skin is warm.   Neurological:      General: No focal deficit present.      Mental Status: She is alert.            Significant Labs:  CBC:  No results for input(s): "WBC", "RBC", "HGB", "HCT", "PLT", "MCV", "MCH", "MCHC" in the last 168 hours.  BNP:  No results for input(s): "BNP" in the last 168 hours.    Invalid " "input(s): "BNPTRIAGELBLO"  CMP:  No results for input(s): "GLU", "CALCIUM", "ALBUMIN", "PROT", "NA", "K", "CO2", "CL", "BUN", "CREATININE", "ALKPHOS", "ALT", "AST", "BILITOT" in the last 168 hours.   Coagulation:   No results for input(s): "PT", "INR", "APTT" in the last 168 hours.  LDH:  No results for input(s): "LDH" in the last 72 hours.  Microbiology:  Microbiology Results (last 7 days)       ** No results found for the last 168 hours. **            I have reviewed all pertinent labs within the past 24 hours.    Diagnostic Results:  I have reviewed all pertinent imaging results/findings within the past 24 hours.    "

## 2024-07-11 NOTE — H&P
[facility] - Surgery ([facility])  Heart Transplant  H&P    Patient Name: Mya Sarmiento  MRN: [id number]  Admission Date: 7/11/2024  Attending Physician: Sparkle Cuello MD  Primary Care Provider: Carlota, Primary Doctor  Principal Problem:S/P robot-assisted surgical procedure    Subjective:     History of Present Illness:  Ms. Sarmiento is a very pleasant 33 y.o. year old white female with WHO group 1 PH on a stable dose of SQ Remodulin (40 ng/kg/min) and Opsumit 10 mg daily who is admitted after robotic hysterectomy and BL oophorectomy. Admitted to observation post operatively     Past Medical History:   Diagnosis Date    BMI 32.0-32.9,adult 11/9/2022    Migraine headache 2000    Ongoing    Morbidly obese     Unknown start date; lost over 80 pounds between 6558-4073    Neuropathy 2019    Bilateral feet; ongoing    Ovarian cyst 2017    Bilaterally; ongoing    Pulmonary hypertension 12/01/2022    Ongoing    Severe tricuspid regurgitation 11/09/2022    Resolved 6/28/2023 per echo    Thyroid disease 2013    Ongoing (S/P left partial thyroidectomy)    Vaginal delivery 12/11/2012       Past Surgical History:   Procedure Laterality Date    INSERTION, WIRELESS SENSOR, FOR PULMONARY ARTERIAL PRESSURE MONITORING Right 8/18/2023    Procedure: Insertion, Wireless Sensor, For Pulmonary Arterial Pressure Monitoring;  Surgeon: Maged Rolon MD;  Location: [facility] CATH LAB;  Service: Cardiology;  Laterality: Right;    LEFT HEART CATHETERIZATION Left 12/01/2022    Procedure: Left heart cath;  Surgeon: Kee Magana MD;  [facility];  Service: Cardiovascular;  Laterality: Left;    RIGHT HEART CATHETERIZATION Right 12/01/2022    Procedure: INSERTION, CATHETER, RIGHT HEART;  Surgeon: Kee Magana MD;  [facility];  Service: Cardiovascular;  Laterality: Right;  + [place]    RIGHT HEART CATHETERIZATION Right 02/01/2023    Procedure: INSERTION, CATHETER, RIGHT HEART;  Surgeon: Danny Clark MD;  Location: [facility] CATH  LAB;  Service: Cardiology;  Laterality: Right;  with nitric oxide study    RIGHT HEART CATHETERIZATION Right 8/18/2023    Procedure: INSERTION, CATHETER, RIGHT HEART;  Surgeon: Maged Rolon MD;  Location: [facility] CATH LAB;  Service: Cardiology;  Laterality: Right;    RIGHT HEART CATHETERIZATION Right 12/12/2023    Procedure: INSERTION, CATHETER, RIGHT HEART;  Surgeon: Maged Rolon MD;  Location: [facility] CATH LAB;  Service: Cardiology;  Laterality: Right;    SELECTIVE UNILATERAL PULMONARY ANGIOGRAPHY  8/18/2023    Procedure: Pumonary angiography - unilateral;  Surgeon: Maged Rolon MD;  Location: [facility] CATH LAB;  Service: Cardiology;;    THYROIDECTOMY, PARTIAL Left 2013       Review of patient's allergies indicates:   Allergen Reactions    Amoxicillin Other (See Comments)     Weak, sick, jaw tightens    Penicillins      Weak, sick, jaw tightens       Current Facility-Administered Medications   Medication    acetaminophen tablet 1,000 mg    dextrose 10% bolus 125 mL 125 mL    dextrose 10% bolus 250 mL 250 mL    glucagon (human recombinant) injection 1 mg    haloperidol lactate injection 0.5 mg    heparin (porcine) injection 5,000 Units    HYDROmorphone injection 0.2 mg    HYDROmorphone injection 0.4 mg    hydrOXYzine pamoate capsule 25 mg    ketorolac injection 15 mg    Followed by    [START ON 7/12/2024] ibuprofen tablet 200 mg    lactated ringers infusion    [START ON 7/12/2024] levothyroxine tablet 175 mcg    LIDOcaine (PF) 10 mg/ml (1%) injection 10 mg    magnesium hydroxide 400 mg/5 ml suspension 2,400 mg    mupirocin 2 % ointment    naloxone 0.4 mg/mL injection 0.02 mg    ondansetron injection 4 mg    oxyCODONE immediate release tablet 5 mg    oxyCODONE immediate release tablet Tab 10 mg    pregabalin capsule 100 mg    prochlorperazine injection Soln 5 mg    senna tablet 8.6 mg    sodium chloride 0.9% flush 10 mL    treprostinil 10 mg/mL vial    zolpidem tablet 5 mg     Family History       Problem Relation  (Age of Onset)    Cancer Maternal Grandfather    No Known Problems Mother, Father    Ovarian cancer Maternal Grandmother          Tobacco Use    Smoking status: Former     Current packs/day: 0.00     Average packs/day: 1 pack/day for 5.0 years (5.0 ttl pk-yrs)     Types: Cigarettes     Start date:      Quit date:      Years since quittin.5    Smokeless tobacco: Never   Substance and Sexual Activity    Alcohol use: Not Currently     Comment: Reports drinking wine, malt liquor, and daquiris in the past. Current alcohol use 1-2 drinks once every 3-4 months.    Drug use: Never    Sexual activity: Yes     Partners: Male     Birth control/protection: OCP     Review of Systems   Constitutional:  Negative for chills and fever.   HENT:  Negative for congestion.    Eyes:  Negative for visual disturbance.   Respiratory:  Negative for shortness of breath.    Gastrointestinal:  Negative for abdominal pain, diarrhea and nausea.   Genitourinary:  Negative for dysuria.   Musculoskeletal:  Negative for back pain.   Neurological:  Negative for dizziness.   Psychiatric/Behavioral:  Negative for confusion.      Objective:     Vital Signs (Most Recent):  Temp: 97.9 °F (36.6 °C) (24 1515)  Pulse: 65 (24 1629)  Resp: (!) 22 (24 1629)  BP: (!) 101/55 (24 1530)  SpO2: (!) 94 % (24 1629) Vital Signs (24h Range):  Temp:  [97.9 °F (36.6 °C)-98.2 °F (36.8 °C)] 97.9 °F (36.6 °C)  Pulse:  [65-79] 65  Resp:  [14-22] 22  SpO2:  [94 %-100 %] 94 %  BP: (101-127)/(55-66) 101/55     Patient Vitals for the past 72 hrs (Last 3 readings):   Weight   24 0836 81.2 kg (179 lb)     Body mass index is 34.96 kg/m².      Intake/Output Summary (Last 24 hours) at 2024 1633  Last data filed at 2024 1442  Gross per 24 hour   Intake 1600 ml   Output 100 ml   Net 1500 ml          Physical Exam  Vitals reviewed.   HENT:      Head: Normocephalic.      Nose: Nose normal.   Eyes:      Conjunctiva/sclera:  "Conjunctivae normal.   Cardiovascular:      Rate and Rhythm: Normal rate and regular rhythm.   Pulmonary:      Effort: Pulmonary effort is normal.   Musculoskeletal:         General: Normal range of motion.      Cervical back: Normal range of motion.   Skin:     General: Skin is warm.   Neurological:      General: No focal deficit present.      Mental Status: She is alert.            Significant Labs:  CBC:  No results for input(s): "WBC", "RBC", "HGB", "HCT", "PLT", "MCV", "MCH", "MCHC" in the last 168 hours.  BNP:  No results for input(s): "BNP" in the last 168 hours.    Invalid input(s): "BNPTRIAGELBLO"  CMP:  No results for input(s): "GLU", "CALCIUM", "ALBUMIN", "PROT", "NA", "K", "CO2", "CL", "BUN", "CREATININE", "ALKPHOS", "ALT", "AST", "BILITOT" in the last 168 hours.   Coagulation:   No results for input(s): "PT", "INR", "APTT" in the last 168 hours.  LDH:  No results for input(s): "LDH" in the last 72 hours.  Microbiology:  Microbiology Results (last 7 days)       ** No results found for the last 168 hours. **            I have reviewed all pertinent labs within the past 24 hours.    Diagnostic Results:  I have reviewed all pertinent imaging results/findings within the past 24 hours.    Assessment/Plan:     * s/p Robotic TLH, BSO, cystectomy  -S/P robotic hysterectomy and BL oophorectomy   -Pain medication and post operative plans per OB team     WHO group 1 pulmonary arterial hypertension  - Continue home medications and SQ remodulin  -Lasix 40 mg QD        Jesenia Dutton PA-C  Heart Transplant  [facility] - Surgery (2nd Fl)  "

## 2024-07-11 NOTE — OP NOTE
[facility] - Surgery (2nd Fl)    Procedure(s) (LRB):  XI ROBOTIC HYSTERECTOMY (N/A)  ROBOTIC OOPHORECTOMY (Bilateral)    DATE OF SURGERY  7/11/2024     Surgeon(s) and Role  Primary: Kris Melvin MD     ANESTHESIA TYPE  General     PRE-OPERATIVE DIAGNOSIS  Adnexal mass [N94.89]  Acute on chronic congestive heart failure with right ventricular diastolic dysfunction [I50.82, I50.33]  Chronic pulmonary heart disease [I27.9]  Pulmonary hypertension [I27.20]  Severe tricuspid regurgitation [I07.1]  BMI 32.0-32.9,adult [Z68.32]    POST-OPERATIVE DIAGNOSIS  Post-Op Diagnosis Codes:     * Adnexal mass [N94.89]     * Acute on chronic congestive heart failure with right ventricular diastolic dysfunction [I50.82, I50.33]     * Chronic pulmonary heart disease [I27.9]     * Pulmonary hypertension [I27.20]     * Severe tricuspid regurgitation [I07.1]     * BMI 32.0-32.9,adult [Z68.32]    FINDINGS  Normal diaphragms.  Normal liver capsule.  Normal omentum.  20 cm left adnexal mass to the level of the umbilicus with a smooth capsule.  It was mobile with torsion x1.  8-10 cm right adnexal mass with a smooth capsule.  Normal ovary was not visualized and therefore the decision was made to proceed with bilateral salpingo-oophorectomies.  Normal uterus.  Both adnexa was removed intact in a bag and controlled drainage was consistent with endometriomas.     Procedure in Detail: The patient was prepped and draped in synchronous position in [facility]. After sterile prep and drape the Toney catheter was inserted. Gloves and gowns were changed, and we began by directly entering the abdomen.  A small incision was made  5 cm above the umbilicus using a Hossan trocar, the peritoneal cavity was entered, and a pneumoperitoneum was established including trocar placement.  The laparoscope was inserted and examination of the peritoneal cavity revealed the operative findings above.  Intraperitoneal anatomy was normal and we proceeded with  placement of our robotic trocars.. The remaining 3 robotic ports and assistant port were placed under direct vision and the robot was docked.  Intraperitoneal anatomy was normal and we proceeded with the assigned procedure.  Beginning on the right side, the ureter was identified, and the ovarian vessels were sealed and transected using bipolar coagulation. The cardinal ligaments were skeletonized, the ureter visualized laterally, and the uterine vessels were ligated and divided using bipolar coagulation. An identical procedure was performed on the left side.  The bladder flap was developed and the uterosacral ligaments were transected.   The vagina was incised, and a circumferential incision was made.  The tubes, ovaries, uterus, and cervix were placed in a bag and removed through the vagina..  The vagina was closed in a double fashion using Stratafix suture, incorporating the uterosacral ligaments into the cuff closure. The ureters were followed from the pelvic brim to the vaginal cuff closure, and were not compromised. Hemostasis was satisfactory. The trocars were removed, and the fascia of assistant port was not closed. The camera port was closed with Vicryl suture. The skin of all sites was closed with 4-0 Monocryl.  The patient was taken to recovery in stable condition.    Salt, needle, sponge, and instrument count was correct.  I was present and scrubbed for the entirety of the case.     ASSISTANT SURGEONS  Fabienne He's presence was critical to the completion of this case due to a qualified resident not being available.    UNUSUAL CIRCUMSTANCES  Yes. Presence of excessively large surgical specimen which made the procedure take 2X as long as usual.    CONDITION  chronic    POST-OP COMPLICATION  No    DIABETIC  No    SPECIMENS  Specimens (From admission, onward)       Start     Ordered    07/11/24 1443  Specimen to Pathology, Surgery Gynecology and Obstetrics  Once        Comments: Pre-op Diagnosis: Adnexal  mass [N94.89]Acute on chronic congestive heart failure with right ventricular diastolic dysfunction [I50.82, I50.33]Chronic pulmonary heart disease [I27.9]Pulmonary hypertension [I27.20]Severe tricuspid regurgitation [I07.1]BMI 32.0-32.9,adult [Z68.32]Procedure(s):XI ROBOTIC HYSTERECTOMYSALPINGECTOMYEXCISION, CYST, OVARY Number of specimens: 3Name of specimens: 1)uterus and cervix-perm2)right ovary and fallopian tube-perm3)left ovary and fallopian tube-perm     References:    Click here for ordering Quick Tip   Question Answer Comment   Procedure Type: Gynecology and Obstetrics    Specimen Class: Known or suspected malignancy    Release to patient Immediate        07/11/24 1443    07/11/24 1250  Cytology, Fluid/Wash/Brush  Once        Question Answer Comment   Source: Peritoneal/abdominal/pelvic wash    Clinical Information: OVARIAN CYSTS    Specific Site: PELVIS    Other Requests: NONE    Release to patient Immediate        07/11/24 1249                     ESTIMATED BLOOD LOSS  10 cc

## 2024-07-11 NOTE — INTERVAL H&P NOTE
Contacted her. Has meclizine at home. Ok to take. The patient has been examined and the H&P has been reviewed:    I concur with the findings and changes have been noted since the H&P was written: Addition of TLH to operative procedures    Surgery risks, benefits and alternative options discussed and understood by patient/family.    Mya Sarmiento is 34 y.o.  presenting for scheduled RA-TLH/USO.    Temp:  [98.2 °F (36.8 °C)] 98.2 °F (36.8 °C)  Pulse:  [70] 70  Resp:  [18] 18  SpO2:  [97 %] 97 %  BP: (127)/(66) 127/66    General: NAD, alert, oriented, cooperative  HEENT: NCAT, EOM grossly intact  Lungs: Normal WOB  Heart: regular rate  Abdomen: soft, nondistended, nontender, no rebound or guarding    Consents in chart. Pre-operative heparin given at 0902 . All questions answered and concerns addressed. To OR for planned procedure.       Leny Diaz MD  OB/GYN PGY-3     Active Hospital Problems    Diagnosis  POA    Ovarian cyst [N83.209]  Yes     BILATERALLY        Resolved Hospital Problems   No resolved problems to display.

## 2024-07-11 NOTE — TREATMENT PLAN
SQ Remodulin dose 40.65ng/kg/min   DW 82kg  Remunity Pump  Vial concentration used 10mg/ml    Patient due to change tomorrow 7/12 mid day per Patient's Spouse Jeremi

## 2024-07-11 NOTE — TREATMENT PLAN
Spoke to Mr. Blood (patient's spouse)regarding SQ Remdoulin infusion     He stated patient isnt due to change her infusion till tomorrow around lunchtime--he could not give a specific time but stated they have everything with them for her pump including the remote    Plan going into surgery is overnight stay for monitoring     Dr. Cuello to discuss plan with Dr. Melvin once surgery is over    If admission requires simple overnight stay and monitoring with OLI lidia--this RN suggest patient remain on SQ    If OLI longer/HD concerns, or dosing adjustments needed --this RN suggest to switch to IV Remdoulin     Dr. Cuello to give further/admit instructions on Remodulin after surgery    Patient will need TSU bed--OC notified   If ICU setting is needed--please request CICU bed on 3rd floor

## 2024-07-11 NOTE — ANESTHESIA PROCEDURE NOTES
Arterial    Diagnosis: ovarian cyst    Patient location during procedure: done in OR    Staffing  Authorizing Provider: Albaro Schultz Jr., MD  Performing Provider: Flaquito Pulliam MD    Staffing  Performed by: Flaquito Pulliam MD  Authorized by: Albaro Scuhltz Jr., MD    Anesthesiologist was present at the time of the procedure.    Preanesthetic Checklist  Completed: risks and benefits discussed, monitors and equipment checked and anesthesia consent givenArterial  Skin Prep: chlorhexidine gluconate and isopropyl alcohol  Local Infiltration: none  Orientation: left  Location: radial    Catheter Size: 20 G  Catheter placement by Ultrasound guidance. Heme positive aspiration all ports.   Vessel Caliber: small, patent, compressibility normal  Needle advanced into vessel with real time Ultrasound guidance.Insertion Attempts: 3  Assessment  Dressing: secured with tape and tegaderm

## 2024-07-11 NOTE — ASSESSMENT & PLAN NOTE
-S/P robotic hysterectomy and BL oophorectomy   -Pain medication and post operative plans per OB team

## 2024-07-11 NOTE — PROGRESS NOTES
Subject  lab draw for the Biospecimen and Core Research Laboratory study (IRB [id number] PI: Giovanni) done in [facility]  Patient was consented for the study on July 3,2024. Labs were drawn July 11,2024 at 08:44 per protocol

## 2024-07-11 NOTE — ANESTHESIA PROCEDURE NOTES
Intubation    Date/Time: 7/11/2024 11:35 AM    Performed by: Flaquito Pulliam MD  Authorized by: Albaro Schultz Jr., MD    Intubation:     Induction:  Intravenous    Intubated:  Postinduction    Mask Ventilation:  Easy mask    Attempts:  2    Attempted By:  Resident anesthesiologist    Method of Intubation:  Direct    Blade:  Nunez 3    Laryngeal View Grade: Grade IV - neither epiglottis nor glottis seen      Attempted By (2nd Attempt):  Staff anesthesiologist    Method of Intubation (2nd Attempt):  Video laryngoscopy    Blade (2nd Attempt):  Nunez 3    Laryngeal View Grade (2nd Attempt): Grade I - full view of cords      Difficult Airway Encountered?: No      Complications:  None    Airway Device:  Oral endotracheal tube    Airway Device Size:  7.0    Style/Cuff Inflation:  Cuffed (inflated to minimal occlusive pressure)    Tube secured:  22    Placement Verified By:  Capnometry    Findings Post-Intubation:  BS equal bilateral and atraumatic/condition of teeth unchanged  Notes:      Encountered significant trismus upon initial attempt to introduce blade

## 2024-07-11 NOTE — ANESTHESIA POSTPROCEDURE EVALUATION
Anesthesia Post Evaluation    Patient: Mya Sarmiento    Procedure(s) Performed: Procedure(s) (LRB):  XI ROBOTIC HYSTERECTOMY (N/A)  ROBOTIC OOPHORECTOMY (Bilateral)    Final Anesthesia Type: general      Patient location during evaluation: PACU  Patient participation: Yes- Able to Participate  Level of consciousness: awake and alert  Post-procedure vital signs: reviewed and stable  Pain management: adequate  Airway patency: patent    PONV status at discharge: No PONV  Anesthetic complications: no      Cardiovascular status: blood pressure returned to baseline  Respiratory status: unassisted  Hydration status: euvolemic  Follow-up not needed.              Vitals Value Taken Time   /58 07/11/24 1547   Temp 36.6 °C (97.9 °F) 07/11/24 1515   Pulse 71 07/11/24 1550   Resp 13 07/11/24 1550   SpO2 94 % 07/11/24 1550   Vitals shown include unfiled device data.      Event Time   Out of Recovery 15:30:00         Pain/Melina Score: Pain Rating Prior to Med Admin: 0 (7/11/2024  9:01 AM)  Melina Score: 9 (7/11/2024  3:30 PM)

## 2024-07-12 VITALS
HEIGHT: 60 IN | HEART RATE: 87 BPM | RESPIRATION RATE: 18 BRPM | OXYGEN SATURATION: 97 % | WEIGHT: 181 LBS | SYSTOLIC BLOOD PRESSURE: 95 MMHG | BODY MASS INDEX: 35.53 KG/M2 | DIASTOLIC BLOOD PRESSURE: 53 MMHG | TEMPERATURE: 99 F

## 2024-07-12 LAB
ALBUMIN SERPL BCP-MCNC: 3.1 G/DL (ref 3.5–5.2)
ALP SERPL-CCNC: 107 U/L (ref 55–135)
ALT SERPL W/O P-5'-P-CCNC: 15 U/L (ref 10–44)
ANION GAP SERPL CALC-SCNC: 9 MMOL/L (ref 8–16)
AST SERPL-CCNC: 16 U/L (ref 10–40)
BASOPHILS # BLD AUTO: 0.02 K/UL (ref 0–0.2)
BASOPHILS NFR BLD: 0.1 % (ref 0–1.9)
BILIRUB SERPL-MCNC: 0.5 MG/DL (ref 0.1–1)
BNP SERPL-MCNC: 188 PG/ML (ref 0–99)
BUN SERPL-MCNC: 14 MG/DL (ref 6–20)
CALCIUM SERPL-MCNC: 8.4 MG/DL (ref 8.7–10.5)
CHLORIDE SERPL-SCNC: 108 MMOL/L (ref 95–110)
CO2 SERPL-SCNC: 21 MMOL/L (ref 23–29)
CREAT SERPL-MCNC: 0.9 MG/DL (ref 0.5–1.4)
DIFFERENTIAL METHOD BLD: ABNORMAL
EOSINOPHIL # BLD AUTO: 0 K/UL (ref 0–0.5)
EOSINOPHIL NFR BLD: 0.3 % (ref 0–8)
ERYTHROCYTE [DISTWIDTH] IN BLOOD BY AUTOMATED COUNT: 14.7 % (ref 11.5–14.5)
EST. GFR  (NO RACE VARIABLE): >60 ML/MIN/1.73 M^2
GLUCOSE SERPL-MCNC: 116 MG/DL (ref 70–110)
HCT VFR BLD AUTO: 35.5 % (ref 37–48.5)
HGB BLD-MCNC: 11.7 G/DL (ref 12–16)
IMM GRANULOCYTES # BLD AUTO: 0.07 K/UL (ref 0–0.04)
IMM GRANULOCYTES NFR BLD AUTO: 0.5 % (ref 0–0.5)
LYMPHOCYTES # BLD AUTO: 1.2 K/UL (ref 1–4.8)
LYMPHOCYTES NFR BLD: 9.1 % (ref 18–48)
MAGNESIUM SERPL-MCNC: 2.2 MG/DL (ref 1.6–2.6)
MCH RBC QN AUTO: 28.3 PG (ref 27–31)
MCHC RBC AUTO-ENTMCNC: 33 G/DL (ref 32–36)
MCV RBC AUTO: 86 FL (ref 82–98)
MONOCYTES # BLD AUTO: 0.7 K/UL (ref 0.3–1)
MONOCYTES NFR BLD: 5.3 % (ref 4–15)
NEUTROPHILS # BLD AUTO: 11.6 K/UL (ref 1.8–7.7)
NEUTROPHILS NFR BLD: 84.7 % (ref 38–73)
NRBC BLD-RTO: 0 /100 WBC
PLATELET # BLD AUTO: 226 K/UL (ref 150–450)
PMV BLD AUTO: 11.2 FL (ref 9.2–12.9)
POTASSIUM SERPL-SCNC: 3.7 MMOL/L (ref 3.5–5.1)
PROT SERPL-MCNC: 6.3 G/DL (ref 6–8.4)
RBC # BLD AUTO: 4.14 M/UL (ref 4–5.4)
SODIUM SERPL-SCNC: 138 MMOL/L (ref 136–145)
WBC # BLD AUTO: 13.66 K/UL (ref 3.9–12.7)

## 2024-07-12 PROCEDURE — 83735 ASSAY OF MAGNESIUM: CPT | Performed by: PHYSICIAN ASSISTANT

## 2024-07-12 PROCEDURE — 36415 COLL VENOUS BLD VENIPUNCTURE: CPT | Performed by: PHYSICIAN ASSISTANT

## 2024-07-12 PROCEDURE — 83880 ASSAY OF NATRIURETIC PEPTIDE: CPT | Performed by: PHYSICIAN ASSISTANT

## 2024-07-12 PROCEDURE — 63600175 PHARM REV CODE 636 W HCPCS

## 2024-07-12 PROCEDURE — 25000003 PHARM REV CODE 250

## 2024-07-12 PROCEDURE — 99214 OFFICE O/P EST MOD 30 MIN: CPT | Mod: ,,, | Performed by: PHYSICIAN ASSISTANT

## 2024-07-12 PROCEDURE — 85025 COMPLETE CBC W/AUTO DIFF WBC: CPT | Performed by: PHYSICIAN ASSISTANT

## 2024-07-12 PROCEDURE — 80053 COMPREHEN METABOLIC PANEL: CPT | Performed by: PHYSICIAN ASSISTANT

## 2024-07-12 RX ORDER — ESTRADIOL 0.05 MG/D
1 PATCH TRANSDERMAL
Status: DISCONTINUED | OUTPATIENT
Start: 2024-07-12 | End: 2024-07-12 | Stop reason: HOSPADM

## 2024-07-12 RX ORDER — IBUPROFEN 600 MG/1
600 TABLET ORAL EVERY 6 HOURS
Qty: 20 TABLET | Refills: 1 | Status: SHIPPED | OUTPATIENT
Start: 2024-07-12

## 2024-07-12 RX ORDER — ACETAMINOPHEN 500 MG
1000 TABLET ORAL EVERY 6 HOURS
Qty: 60 TABLET | Refills: 1 | Status: SHIPPED | OUTPATIENT
Start: 2024-07-12

## 2024-07-12 RX ORDER — AMOXICILLIN 250 MG
1 CAPSULE ORAL 2 TIMES DAILY PRN
Qty: 20 TABLET | Refills: 0 | Status: SHIPPED | OUTPATIENT
Start: 2024-07-12

## 2024-07-12 RX ORDER — ESTRADIOL 0.05 MG/D
1 PATCH TRANSDERMAL
Qty: 4 PATCH | Refills: 2 | Status: SHIPPED | OUTPATIENT
Start: 2024-07-12 | End: 2024-10-04

## 2024-07-12 RX ORDER — OXYCODONE HYDROCHLORIDE 5 MG/1
5 TABLET ORAL EVERY 4 HOURS PRN
Qty: 10 TABLET | Refills: 0 | Status: SHIPPED | OUTPATIENT
Start: 2024-07-12

## 2024-07-12 RX ADMIN — ACETAMINOPHEN 1000 MG: 500 TABLET ORAL at 05:07

## 2024-07-12 RX ADMIN — KETOROLAC TROMETHAMINE 15 MG: 15 INJECTION, SOLUTION INTRAMUSCULAR; INTRAVENOUS at 05:07

## 2024-07-12 RX ADMIN — SENNOSIDES 8.6 MG: 8.6 TABLET, FILM COATED ORAL at 08:07

## 2024-07-12 RX ADMIN — MAGNESIUM HYDROXIDE 2400 MG: 400 SUSPENSION ORAL at 08:07

## 2024-07-12 RX ADMIN — MUPIROCIN: 20 OINTMENT TOPICAL at 08:07

## 2024-07-12 RX ADMIN — LEVOTHYROXINE SODIUM 175 MCG: 125 TABLET ORAL at 05:07

## 2024-07-12 NOTE — CARE UPDATE
Patient seen after hysterectomy.   Doing well.  Remodulin never stopped, currently has pump on.  Also has her Opsumit with her to take in the AM.     Likely d/c in the AM    Natalee Augustine MD PGY 8  Cardiology  Pager: [phone]  [facility]

## 2024-07-12 NOTE — OPERATIVE NOTE ADDENDUM
Certification of Assistant at Surgery       Surgery Date: 7/11/2024     Participating Surgeons:  Surgeons and Role:     * Kris Melvin MD - Primary    Procedures:  Procedure(s) (LRB):  XI ROBOTIC HYSTERECTOMY (N/A)  ROBOTIC OOPHORECTOMY (Bilateral)    Assistant Surgeon's Certification of Necessity:  I understand that section 1842 (b) (6) (d) of the Social Security Act generally prohibits Medicare Part B reasonable charge payment for the services of assistants at surgery in teaching hospitals when qualified residents are available to furnish such services. I certify that the services for which payment is claimed were medically necessary, and that no qualified resident was available to perform the services. I further understand that these services are subject to post-payment review by the Medicare carrier.      NAYLEI Yip    07/12/2024  2:06 PM

## 2024-07-12 NOTE — PROGRESS NOTES
Progress Note  Gynecology    Admit Date: 2024  LOS: 1    Reason for Admission:  S/P robot-assisted surgical procedure    SUBJECTIVE:     Mya Sarmiento is a 34 y.o.  who is POD#1 s/p RA-TLH/YOMAIRA  for the treatment of bilateral ovarian cysts with Dr. Melvin. Of note, pt has a history of pulmonary hypertension, severe tricuspid regurgitation, and acute on chronic congestive heart failure with R ventricular diastolic dysfunction.     Pt doing well this morning. Pain is well controlled. She is tolerating a regular diet without N/V. Ambulating without difficulty. Voiding without difficulty spontaneously. Passing flatus. She is not yet having bowel movements.    OBJECTIVE:     Vital Signs   Temp:  [97.5 °F (36.4 °C)-99.3 °F (37.4 °C)] 98.4 °F (36.9 °C)  Pulse:  [65-89] 82  Resp:  [14-23] 18  SpO2:  [94 %-100 %] 94 %  BP: (101-127)/(55-68) 119/59      Intake/Output Summary (Last 24 hours) at 2024 0527  Last data filed at 2024 1754  Gross per 24 hour   Intake 1600 ml   Output 175 ml   Net 1425 ml       Physical Exam:  Gen: A&Ox3, NAD  CV: RRR  Pulm: normal respiratory effort, breathing comfortably on room air   Abd: active bowel sounds, soft, non-distended, non-tender to palpation without rebound or guarding. Port sites c/d/i  Ext: PPP, no peripheral edema,     Laboratory:  Recent Results (from the past 24 hour(s))   COVID-19 Rapid Screening    Collection Time: 24  8:59 AM   Result Value Ref Range    SARS-CoV-2 RNA, Amplification, Qual Negative Negative   POCT urine pregnancy    Collection Time: 24  9:00 AM   Result Value Ref Range    POC Preg Test, Ur Negative Negative     Acceptable Yes        Diagnostic Results:  N/a     ASSESSMENT/PLAN:     Active Hospital Problems    Diagnosis  POA    *s/p Robotic TLH, BSO, cystectomy [Z98.890]  Not Applicable    WHO group 1 pulmonary arterial hypertension [I27.21]  Yes     PA pressure 110/48 on RHC.        Resolved Hospital Problems     Diagnosis Date Resolved POA    Ovarian cyst [N83.209] 2024 Yes     BILATERALLY         Assessment: 34 y.o.  who is POD#1 s/p RA-TLH/YOMAIRA  for the treatment of AUB ISO bilateral ovarian cysts   With Dr. Melvin. Of note, pt has a history of pulmonary hypertension, severe tricuspid regurgitation, and acute on chronic congestive heart failure with R ventricular diastolic dysfunction.   Plan:   1. Post-op   - Routine post-op advances  - Continue PRN pain medications  - pt is voiding spontaneously, UOP adequate    - Encourage continued ambulation   - Encourage IS  - Antiemetics prn nausea/vomiting.    Pt is stable from a post operative standpoint. She continues to meet her post operative milestones, as she is ambulating, voiding, having bowel movements, tolerating PO, and has pain well controlled on current regimen. Remainder of care per primary team.     Dispo: Patient has met adequate post op milestones for discharge from a surgical perspective, plan for discharge to home per primary team     Mairlu Rivero MD   Obstetrics and Gynecology, PGY1

## 2024-07-12 NOTE — PROGRESS NOTES
AVS reviewed with pt and spouse at bedside. They verbalized understanding. Time allowed for questions. Pt to  prescriptions from pharmacy downstairs before leaving. VSS. PIV removed x2. Tele dc'd. Pt declined pt escort, she ambulated off the unit with her spouse, no distress was noted.

## 2024-07-12 NOTE — DISCHARGE SUMMARY
[facility] - Transplant Stepdown  Heart Transplant  Discharge Summary      Patient Name: Mya Sarmiento  MRN: [id number]  Admission Date: 2024  Hospital Length of Stay: 1 days  Discharge Date and Time: 2024 12:25 PM  Attending Physician: Sparkle Cuello PA-C  Discharging Provider: Jesenia Dutton PA-C  Primary Care Provider: Carlota, Primary Doctor     HPI: Ms. Sarmiento is a very pleasant 33 y.o. year old white female with WHO group 1 PH on a stable dose of SQ Remodulin (40 ng/kg/min) and Opsumit 10 mg daily who is admitted after robotic hysterectomy and BL oophorectomy. Admitted to observation post operatively     Procedure(s) (LRB):  XI ROBOTIC HYSTERECTOMY (N/A)  ROBOTIC OOPHORECTOMY (Bilateral)     Hospital Course: Mya Sarmiento is a 34 y.o. y.o.  female who presented on 2024 for above procedures for the treatment of bilateral large ovarian cysts. Patient tolerated procedure. PMH significant for pulmonary hypertension requiring overnight observation with transplant cardiology. Post-operative course was uncomplicated.  On day of discharge, patient was urinating, ambulating, and tolerating a regular diet without difficulty. Pain was well controlled on PO medication. She was discharged home on POD#1 in stable condition with instructions to follow up with Dr. Melvin in 4 weeks    Goals of Care Treatment Preferences:  Code Status: Full Code          Significant Diagnostic Studies: Labs: CBC   Recent Labs   Lab 24  0755   WBC 13.66*   HGB 11.7*   HCT 35.5*       and All labs within the past 24 hours have been reviewed    Pending Diagnostic Studies:       Procedure Component Value Units Date/Time    Cytology, Fluid/Wash/Brush [[id number]] Collected: 24 1250    Order Status: Sent Lab Status: In process Updated: 24 1318    Specimen: Body Fluid     Specimen to Pathology, Surgery Gynecology and Obstetrics [[id number]] Collected: 24 1443    Order Status: Sent Lab Status: In  process Updated: 07/11/24 1713    Specimen: Tissue           Final Active Diagnoses:    Diagnosis Date Noted POA    PRINCIPAL PROBLEM:  s/p Robotic TLH, BSO, cystectomy [Z98.890] 07/11/2024 Not Applicable    WHO group 1 pulmonary arterial hypertension [I27.21] 12/01/2022 Yes      Problems Resolved During this Admission:    Diagnosis Date Noted Date Resolved POA    Ovarian cyst [N83.209]  07/11/2024 Yes      Discharged Condition: stable    Disposition: Home or Self Care    Follow Up:   Follow-up Information       Kris Melvin MD. Go on 8/6/2024.    Specialty: Gynecologic Oncology  Why: for post-op visit  Contact information:  [address]  [place] [id number] [phone]                           Patient Instructions:      Diet general     Lifting restrictions   Order Comments: No lifting greater than 15 pounds for six weeks.     Other restrictions (specify):   Order Comments: PELVIC REST:  No douching, tampons, or intercourse for 6 weeks.    If prescribed, vaginal estrogen cream may be used during the postoperative period.     DRIVING:  No driving while on narcotics. Driving may be resumed initially with a competent passenger one to two weeks after surgery if no longer taking narcotics.     EXERCISE:  For six weeks your exercise should be limited to walking. You may walk as far as you wish, as long as you increase your level of exertion gradually and avoid slippery surfaces. You may climb stairs as needed to get around, but should not use stair climbing for exercise.     Remove dressing in 24 hours   Order Comments: If you have a bandage on wound, you may remove it the day after dismissal.  If you had steri-strips remove them once they begin to peel off (usually 2 weeks). Keep incision clean and dry.  Inspect the incision daily for signs and symptoms of infection.     Wound care routine (specify)   Order Comments: WOUND CARE:  If you have a band-aid or bandage on your wound, you may remove it  the day after dismissal.  If you had steri-strips remove them once they begin to peel off (usually 2 weeks).  If your steri-strips still haven't come off in 2 weeks, please remove them. You may wash the wound with mild soap and water.   You may shower at any time but should avoid immersing any abdominal incisions in water for at least two weeks after surgery or until the wound is completely healed. If given, please shower with Hibiclens soap until bottle is completely finished. Keep your wound clean and dry.  You should observe your incision for signs of infection which include redness, warmth, drainage or fever.     Call MD for:  temperature >100.4     Call MD for:  persistent nausea and vomiting     Call MD for:  severe uncontrolled pain     Call MD for:  difficulty breathing, headache or visual disturbances     Call MD for:  redness, tenderness, or signs of infection (pain, swelling, redness, odor or green/yellow discharge around incision site)     Call MD for:  hives     Call MD for:   Order Comments: inability to void,urine is ketchup colored or you have large clots, vaginal bleeding is heavier than a period.    VAGINAL DISCHARGE: You may develop a vaginal discharge and intermittent vaginal spotting after surgery and up to 6 weeks postoperatively.  The discharge may have an odor and may change in color but it is normal.  This is due to dissolving stiches.  Contact your surgical team if you develop vaginal or vulvar irritation along with a discharge.  Also contact your surgical team if you have vaginal discharge that smells like urine or stool.    CONSTIPATION REMEDIES: Patients are often constipated after surgery or with use of oral narcotic medicine. You should continue to take the stool softener, Senokot-S during the next six weeks, and consume adequate amounts of water.  If you have not had a bowel movement for 3 days after dismissal, or are uncomfortable and unable to pass stool, please try one or all of  the following measures:  1.  Milk of Magnesia - 30 cc by mouth every 12 hours   2.  Dulcolax suppository - One suppository per rectum every 4-6 hours   3.  Metamucil, Fibercon or other bulk former - use as directed  4.  Fleets Enema        PAIN MEDICATIONS:     Take your pain medications as instructed. It is best to take pain medications before your pain becomes severe. This will allow you to take less medication yet have better pain relief. For the first 2 or 3 days it may be helpful to take your pain medications on a regular schedule (e.g. every 4 to 6 hours). This will help you to keep your pain under better control. You should then begin to take fewer medications each day until you no longer need them. Do not take pain medication on an empty stomach. This may lead to nausea and vomiting.     Activity as tolerated   Order Comments: Return to normal activity slowly as you feel able.  For 6 weeks your exercise should be limited to walking.  You may walk as far as you wish, as long as you increase your level of exertion gradually and avoid slippery surfaces.    If you had a hysterectomy at the surgery do not insert anything in your vagina for 9 weeks.     Shower on day dressing removed (No bath)   Order Comments: You may shower at any time but should avoid immersing any abdominal incisions in water for at least 2 weeks after surgery or until the wound is completely healed.  If given, please shower with Hibaclens soap until bottle is completely finish     Medications:  Reconciled Home Medications:      Medication List        START taking these medications      estradiol 0.05 mg/24 hr td ptwk 0.05 mg/24 hr Ptwk  Place 1 patch onto the skin every 7 days.     ibuprofen 600 MG tablet  Commonly known as: ADVILMOTRIN  Take 1 tablet by mouth every 6 hours. Alternate between ibuprofen and tylenol every 3 hours. For example: @0800: ibuprofen 600mg @1100: tylenol 1000mg @1400: ibuprofen 600mg @1700: tylenol 1000 mg @2000:  ibuprofen 600mg     oxyCODONE 5 MG immediate release tablet  Commonly known as: ROXICODONE  Take 1 tablet (5 mg total) by mouth every 4 (four) hours as needed for Pain.     STOOL SOFTENER-LAXATIVE 8.6-50 mg per tablet  Generic drug: senna-docusate 8.6-50 mg  Take 1 tablet by mouth 2 (two) times daily as needed for Constipation.            CHANGE how you take these medications      acetaminophen 500 MG tablet  Commonly known as: TYLENOL  Take 2 tablets by mouth every 6 hours. Alternate between ibuprofen and tylenol every 3 hours. For example: @0800: ibuprofen 600mg @1100: tylenol 1000mg @1400: ibuprofen 600mg @1700: tylenol 1000 mg @2000: ibuprofen 600mg  What changed:   when to take this  reasons to take this     furosemide 40 MG tablet  Commonly known as: LASIX  Take 1 tablet (40 mg total) by mouth once daily.  What changed: when to take this     spironolactone 25 MG tablet  Commonly known as: ALDACTONE  Take 1 tablet (25 mg total) by mouth once daily.  What changed: when to take this            CONTINUE taking these medications      aspirin 81 MG EC tablet  Commonly known as: ECOTRIN  Take 1 tablet (81 mg total) by mouth once daily.     hydrOXYzine pamoate 25 MG Cap  Commonly known as: VISTARIL  Take 1 capsule (25 mg total) by mouth 4 (four) times daily.     levothyroxine 175 MCG tablet  Commonly known as: SYNTHROID, LEVOTHROID  Take 175 mcg by mouth every morning.     macitentan 10 mg Tab  Take 10 mg by mouth once daily.     pregabalin 50 MG capsule  Commonly known as: LYRICA  TAKE 2 CAPSULES (100 MG TOTAL) BY MOUTH EVERY EVENING.     tadalafil 20 mg Tab  Commonly known as: ADCIRCA  Take 2 tablets (40 mg total) by mouth once daily. Take 1 tablet (20 mg) once daily for 2 weeks, then increase to 2 tablets (40 mg) once daily.     treprostinil 2.5 mg/mL Soln  Commonly known as: REMODULIN  Inject into the skin continuous.            STOP taking these medications      drospirenone-ethinyl estradioL 3-0.03 mg per  tablet  Commonly known as: VALERIO (28)              Jesenia Dutton PA-C  Heart Transplant  [facility] - Transplant Stepdown

## 2024-07-12 NOTE — PROGRESS NOTES
[facility] - Transplant Stepdown  Heart Transplant  Progress Note    Patient Name: Mya Sarmiento  MRN: [id number]  Admission Date: 7/11/2024  Hospital Length of Stay: 1 days  Attending Physician: No att. providers found  Primary Care Provider: No, Primary Doctor  Principal Problem:S/P robot-assisted surgical procedure    Subjective:   Interval History: POD #1 robotic hysterectomy and oophorectomy. Uncomplicated post operative course. Will discharge today.       Continuous Infusions:   treprostinil 10 mg/mL  1 vial Subcutaneous Continuous         Scheduled Meds:   acetaminophen  1,000 mg Oral Q6H    estradiol 0.05 mg/24 hr td ptwk  1 patch Transdermal Q7 Days    ketorolac  15 mg Intravenous Q6H    Followed by    ibuprofen  200 mg Oral Q6H    levothyroxine  175 mcg Oral QAM    magnesium hydroxide 400 mg/5 ml  30 mL Oral BID    mupirocin   Nasal BID    pregabalin  100 mg Oral QHS    senna  8.6 mg Oral BID     PRN Meds:  Current Facility-Administered Medications:     dextrose 10%, 12.5 g, Intravenous, PRN    dextrose 10%, 25 g, Intravenous, PRN    glucagon (human recombinant), 1 mg, Intramuscular, PRN    HYDROmorphone, 0.4 mg, Intravenous, Q2H PRN    naloxone, 0.02 mg, Intravenous, PRN    ondansetron, 4 mg, Intravenous, Q6H PRN    oxyCODONE, 5 mg, Oral, Q4H PRN    oxyCODONE, 10 mg, Oral, Q4H PRN    prochlorperazine, 5 mg, Intravenous, Q6H PRN    zolpidem, 5 mg, Oral, Nightly PRN    Review of patient's allergies indicates:   Allergen Reactions    Amoxicillin Other (See Comments)     Weak, sick, jaw tightens    Penicillins      Weak, sick, jaw tightens     Objective:     Vital Signs (Most Recent):  Temp: 98.5 °F (36.9 °C) (07/12/24 1106)  Pulse: 87 (07/12/24 1106)  Resp: 18 (07/12/24 1106)  BP: (!) 95/53 (07/12/24 1106)  SpO2: 97 % (07/12/24 1106) Vital Signs (24h Range):  Temp:  [97.5 °F (36.4 °C)-99.3 °F (37.4 °C)] 98.5 °F (36.9 °C)  Pulse:  [65-97] 87  Resp:  [14-23] 18  SpO2:  [94 %-100 %] 97 %  BP: ()/(53-68)  "95/53     Patient Vitals for the past 72 hrs (Last 3 readings):   Weight   07/11/24 2245 82.1 kg (181 lb)   07/11/24 0836 81.2 kg (179 lb)     Body mass index is 35.35 kg/m².      Intake/Output Summary (Last 24 hours) at 7/12/2024 1221  Last data filed at 7/12/2024 0707  Gross per 24 hour   Intake 2201.67 ml   Output 575 ml   Net 1626.67 ml       Hemodynamic Parameters:              Physical Exam  Vitals and nursing note reviewed.   HENT:      Head: Normocephalic.      Nose: Nose normal.   Eyes:      Conjunctiva/sclera: Conjunctivae normal.   Cardiovascular:      Rate and Rhythm: Normal rate and regular rhythm.   Pulmonary:      Effort: Pulmonary effort is normal.   Abdominal:      General: Bowel sounds are normal.   Musculoskeletal:         General: Normal range of motion.      Cervical back: Normal range of motion.   Skin:     General: Skin is warm.      Capillary Refill: Capillary refill takes less than 2 seconds.   Neurological:      Mental Status: She is alert.   Psychiatric:         Mood and Affect: Mood normal.         Behavior: Behavior normal.         Thought Content: Thought content normal.         Judgment: Judgment normal.            Significant Labs:  CBC:  Recent Labs   Lab 07/12/24  0755   WBC 13.66*   RBC 4.14   HGB 11.7*   HCT 35.5*      MCV 86   MCH 28.3   MCHC 33.0     BNP:  Recent Labs   Lab 07/12/24  0755   *     CMP:  Recent Labs   Lab 07/12/24  0755   *   CALCIUM 8.4*   ALBUMIN 3.1*   PROT 6.3      K 3.7   CO2 21*      BUN 14   CREATININE 0.9   ALKPHOS 107   ALT 15   AST 16   BILITOT 0.5      Coagulation:   No results for input(s): "PT", "INR", "APTT" in the last 168 hours.  LDH:  No results for input(s): "LDH" in the last 72 hours.  Microbiology:  Microbiology Results (last 7 days)       ** No results found for the last 168 hours. **            I have reviewed all pertinent labs within the past 24 hours.    Estimated Creatinine Clearance: 83.6 mL/min (based on " SCr of 0.9 mg/dL).    Diagnostic Results:  I have reviewed and interpreted all pertinent imaging results/findings within the past 24 hours.  Assessment and Plan:     Ms. Sarmiento is a very pleasant 33 y.o. year old white female with WHO group 1 PH on a stable dose of SQ Remodulin (40 ng/kg/min) and Opsumit 10 mg daily who is admitted after robotic hysterectomy and BL oophorectomy. Admitted to observation post operatively     * s/p Robotic TLH, BSO, cystectomy  -S/P robotic hysterectomy and BL oophorectomy   -Pain medication and post operative plans per OB team     WHO group 1 pulmonary arterial hypertension  - Continue home medications and SQ remodulin  -Lasix 40 mg QD        Jesenia Dutton PA-C  Heart Transplant  [facility] - Transplant Step[facility]

## 2024-07-12 NOTE — HOSPITAL COURSE
Mya Sarmiento is a 34 y.o. y.o.  female who presented on 2024 for above procedures for the treatment of bilateral large ovarian cysts. Patient tolerated procedure. PMH significant for pulmonary hypertension requiring overnight observation with transplant cardiology. Post-operative course was uncomplicated.  On day of discharge, patient was urinating, ambulating, and tolerating a regular diet without difficulty. Pain was well controlled on PO medication. She was discharged home on POD#1 in stable condition with instructions to follow up with Dr. Melvin in 4 weeks

## 2024-07-12 NOTE — DISCHARGE SUMMARY
Discharge Summary  Gynecology      Admit Date: 2024    Discharge Date and Time: 2024     Attending Physician: Kris Melvin MD    Principal Diagnoses: S/P robot-assisted surgical procedure    Active Hospital Problems    Diagnosis  POA    *s/p Robotic TLH, BSO, cystectomy [Z98.890]  Not Applicable    WHO group 1 pulmonary arterial hypertension [I27.21]  Yes     PA pressure 110/48 on RHC.        Resolved Hospital Problems    Diagnosis Date Resolved POA    Ovarian cyst [N83.209] 2024 Yes     BILATERALLY         Procedures: Procedure(s) (LRB):  XI ROBOTIC HYSTERECTOMY (N/A)  ROBOTIC OOPHORECTOMY (Bilateral)    Discharged Condition: good    Hospital Course:   Mya Sarmiento is a 34 y.o. y.o.  female who presented on 2024 for above procedures for the treatment of bilateral large ovarian cysts. Patient tolerated procedure. PMH significant for pulmonary hypertension requiring overnight observation with transplant cardiology. Post-operative course was uncomplicated.  On day of discharge, patient was urinating, ambulating, and tolerating a regular diet without difficulty. Pain was well controlled on PO medication. She was discharged home on POD#1 in stable condition with instructions to follow up with Dr. Melvin in 4 weeks.     Consults: Cardiac Transplant     Significant Diagnostic Studies:  Recent Labs   Lab 24  0755   WBC 13.66*   HGB 11.7*   HCT 35.5*   MCV 86           Treatments:   1. Surgery as above    Disposition: Home or Self Care    Patient Instructions:   Current Discharge Medication List        START taking these medications    Details   estradiol 0.05 mg/24 hr td ptwk 0.05 mg/24 hr PTWK Place 1 patch onto the skin every 7 days.  Qty: 4 patch, Refills: 2      ibuprofen (ADVIL,MOTRIN) 200 MG tablet Take 3 tablets (600 mg total) by mouth every 6 (six) hours. Alternate between ibuprofen and tylenol every 3 hours. For example: @0800: ibuprofen 600mg @1100: tylenol 1000mg  @1400: ibuprofen 600mg @1700: tylenol 1000 mg @2000: ibuprofen 600mg  Qty: 60 tablet, Refills: 1      oxyCODONE (ROXICODONE) 5 MG immediate release tablet Take 1 tablet (5 mg total) by mouth every 4 (four) hours as needed for Pain.  Qty: 10 tablet, Refills: 0    Comments: Quantity prescribed more than 7 day supply? No      senna-docusate 8.6-50 mg (PERICOLACE) 8.6-50 mg per tablet Take 1 tablet by mouth 2 (two) times daily as needed for Constipation.  Qty: 20 tablet, Refills: 0           CONTINUE these medications which have CHANGED    Details   acetaminophen (TYLENOL) 500 MG tablet Take 2 tablets (1,000 mg total) by mouth every 6 (six) hours. Alternate between ibuprofen and tylenol every 3 hours. For example: @0800: ibuprofen 600mg @1100: tylenol 1000mg @1400: ibuprofen 600mg @1700: tylenol 1000 mg @2000: ibuprofen 600mg  Qty: 60 tablet, Refills: 1           CONTINUE these medications which have NOT CHANGED    Details   aspirin (ECOTRIN) 81 MG EC tablet Take 1 tablet (81 mg total) by mouth once daily.  Qty: 30 tablet, Refills: 11      furosemide (LASIX) 40 MG tablet Take 1 tablet (40 mg total) by mouth once daily.  Qty: 30 tablet, Refills: 11      hydrOXYzine pamoate (VISTARIL) 25 MG Cap Take 1 capsule (25 mg total) by mouth 4 (four) times daily.  Qty: 15 capsule, Refills: 0      levothyroxine (SYNTHROID, LEVOTHROID) 175 MCG tablet Take 175 mcg by mouth every morning.      macitentan 10 mg Tab Take 10 mg by mouth once daily.      pregabalin (LYRICA) 50 MG capsule TAKE 2 CAPSULES (100 MG TOTAL) BY MOUTH EVERY EVENING.  Qty: 60 capsule, Refills: 11    Comments: This request is for a new prescription for a controlled substance as required by Federal/State law.      spironolactone (ALDACTONE) 25 MG tablet Take 1 tablet (25 mg total) by mouth once daily.  Qty: 30 tablet, Refills: 11    Comments: .      tadalafil (ADCIRCA) 20 mg Tab Take 2 tablets (40 mg total) by mouth once daily. Take 1 tablet (20 mg) once daily for 2  weeks, then increase to 2 tablets (40 mg) once daily.  Qty: 60 tablet, Refills: 11      treprostinil (REMODULIN) 2.5 mg/mL Soln Inject into the skin continuous.           STOP taking these medications       drospirenone-ethinyl estradioL (VALERIO, 28,) 3-0.03 mg per tablet Comments:   Reason for Stopping:               Discharge Procedure Orders   Diet general     Lifting restrictions   Order Comments: No lifting greater than 15 pounds for six weeks.     Other restrictions (specify):   Order Comments: PELVIC REST:  No douching, tampons, or intercourse for 6 weeks.    If prescribed, vaginal estrogen cream may be used during the postoperative period.     DRIVING:  No driving while on narcotics. Driving may be resumed initially with a competent passenger one to two weeks after surgery if no longer taking narcotics.     EXERCISE:  For six weeks your exercise should be limited to walking. You may walk as far as you wish, as long as you increase your level of exertion gradually and avoid slippery surfaces. You may climb stairs as needed to get around, but should not use stair climbing for exercise.     Remove dressing in 24 hours   Order Comments: If you have a bandage on wound, you may remove it the day after dismissal.  If you had steri-strips remove them once they begin to peel off (usually 2 weeks). Keep incision clean and dry.  Inspect the incision daily for signs and symptoms of infection.     Wound care routine (specify)   Order Comments: WOUND CARE:  If you have a band-aid or bandage on your wound, you may remove it the day after dismissal.  If you had steri-strips remove them once they begin to peel off (usually 2 weeks).  If your steri-strips still haven't come off in 2 weeks, please remove them. You may wash the wound with mild soap and water.   You may shower at any time but should avoid immersing any abdominal incisions in water for at least two weeks after surgery or until the wound is completely healed. If  given, please shower with Hibiclens soap until bottle is completely finished. Keep your wound clean and dry.  You should observe your incision for signs of infection which include redness, warmth, drainage or fever.     Call MD for:  temperature >100.4     Call MD for:  persistent nausea and vomiting     Call MD for:  severe uncontrolled pain     Call MD for:  difficulty breathing, headache or visual disturbances     Call MD for:  redness, tenderness, or signs of infection (pain, swelling, redness, odor or green/yellow discharge around incision site)     Call MD for:  hives     Call MD for:   Order Comments: inability to void,urine is ketchup colored or you have large clots, vaginal bleeding is heavier than a period.    VAGINAL DISCHARGE: You may develop a vaginal discharge and intermittent vaginal spotting after surgery and up to 6 weeks postoperatively.  The discharge may have an odor and may change in color but it is normal.  This is due to dissolving stiches.  Contact your surgical team if you develop vaginal or vulvar irritation along with a discharge.  Also contact your surgical team if you have vaginal discharge that smells like urine or stool.    CONSTIPATION REMEDIES: Patients are often constipated after surgery or with use of oral narcotic medicine. You should continue to take the stool softener, Senokot-S during the next six weeks, and consume adequate amounts of water.  If you have not had a bowel movement for 3 days after dismissal, or are uncomfortable and unable to pass stool, please try one or all of the following measures:  1.  Milk of Magnesia - 30 cc by mouth every 12 hours   2.  Dulcolax suppository - One suppository per rectum every 4-6 hours   3.  Metamucil, Fibercon or other bulk former - use as directed  4.  Fleets Enema        PAIN MEDICATIONS:     Take your pain medications as instructed. It is best to take pain medications before your pain becomes severe. This will allow you to take less  medication yet have better pain relief. For the first 2 or 3 days it may be helpful to take your pain medications on a regular schedule (e.g. every 4 to 6 hours). This will help you to keep your pain under better control. You should then begin to take fewer medications each day until you no longer need them. Do not take pain medication on an empty stomach. This may lead to nausea and vomiting.     Activity as tolerated   Order Comments: Return to normal activity slowly as you feel able.  For 6 weeks your exercise should be limited to walking.  You may walk as far as you wish, as long as you increase your level of exertion gradually and avoid slippery surfaces.    If you had a hysterectomy at the surgery do not insert anything in your vagina for 9 weeks.     Shower on day dressing removed (No bath)   Order Comments: You may shower at any time but should avoid immersing any abdominal incisions in water for at least 2 weeks after surgery or until the wound is completely healed.  If given, please shower with Hibaclens soap until bottle is completely finish        Follow-up Information       Kris Melvin MD. Go on 8/6/2024.    Specialty: Gynecologic Oncology  Why: for post-op visit  Contact information:  [address] [address]  [phone]                           Leny Diaz MD  OB/GYN PGY-3

## 2024-07-12 NOTE — PLAN OF CARE
Patient is s/p Hysterectomy on 7/11/024. VSS . Afebrile. Spo2 maintained@RA. NSR on Tele. Scheduled medicines given as per MAR. Subcutaneous Remodulin contd. Pain controlled well by the scheduled Tylenol and IV Toradol, no PRNs administered . LR contd@ 40 ml/hr. Lap sites CDI with band aid dsg, no any s/s of bledding noted. Stand by assist, ambulated up to bathroom to void.  at bedside actively participating in care. Bed in low position, wheels locked. Call light within patient's reach. Plan of care reviewed and discussed with the patient and her spouse,verbalized understanding of care. No any s/s of acute distress noted. Slept well this shift.

## 2024-07-12 NOTE — SUBJECTIVE & OBJECTIVE
Interval History: POD #1 robotic hysterectomy and oophorectomy. Uncomplicated post operative course. Will discharge today.       Continuous Infusions:   treprostinil 10 mg/mL  1 vial Subcutaneous Continuous         Scheduled Meds:   acetaminophen  1,000 mg Oral Q6H    estradiol 0.05 mg/24 hr td ptwk  1 patch Transdermal Q7 Days    ketorolac  15 mg Intravenous Q6H    Followed by    ibuprofen  200 mg Oral Q6H    levothyroxine  175 mcg Oral QAM    magnesium hydroxide 400 mg/5 ml  30 mL Oral BID    mupirocin   Nasal BID    pregabalin  100 mg Oral QHS    senna  8.6 mg Oral BID     PRN Meds:  Current Facility-Administered Medications:     dextrose 10%, 12.5 g, Intravenous, PRN    dextrose 10%, 25 g, Intravenous, PRN    glucagon (human recombinant), 1 mg, Intramuscular, PRN    HYDROmorphone, 0.4 mg, Intravenous, Q2H PRN    naloxone, 0.02 mg, Intravenous, PRN    ondansetron, 4 mg, Intravenous, Q6H PRN    oxyCODONE, 5 mg, Oral, Q4H PRN    oxyCODONE, 10 mg, Oral, Q4H PRN    prochlorperazine, 5 mg, Intravenous, Q6H PRN    zolpidem, 5 mg, Oral, Nightly PRN    Review of patient's allergies indicates:   Allergen Reactions    Amoxicillin Other (See Comments)     Weak, sick, jaw tightens    Penicillins      Weak, sick, jaw tightens     Objective:     Vital Signs (Most Recent):  Temp: 98.5 °F (36.9 °C) (07/12/24 1106)  Pulse: 87 (07/12/24 1106)  Resp: 18 (07/12/24 1106)  BP: (!) 95/53 (07/12/24 1106)  SpO2: 97 % (07/12/24 1106) Vital Signs (24h Range):  Temp:  [97.5 °F (36.4 °C)-99.3 °F (37.4 °C)] 98.5 °F (36.9 °C)  Pulse:  [65-97] 87  Resp:  [14-23] 18  SpO2:  [94 %-100 %] 97 %  BP: ()/(53-68) 95/53     Patient Vitals for the past 72 hrs (Last 3 readings):   Weight   07/11/24 2245 82.1 kg (181 lb)   07/11/24 0836 81.2 kg (179 lb)     Body mass index is 35.35 kg/m².      Intake/Output Summary (Last 24 hours) at 7/12/2024 1221  Last data filed at 7/12/2024 0707  Gross per 24 hour   Intake 2201.67 ml   Output 575 ml   Net  "1626.67 ml       Hemodynamic Parameters:              Physical Exam  Vitals and nursing note reviewed.   HENT:      Head: Normocephalic.      Nose: Nose normal.   Eyes:      Conjunctiva/sclera: Conjunctivae normal.   Cardiovascular:      Rate and Rhythm: Normal rate and regular rhythm.   Pulmonary:      Effort: Pulmonary effort is normal.   Abdominal:      General: Bowel sounds are normal.   Musculoskeletal:         General: Normal range of motion.      Cervical back: Normal range of motion.   Skin:     General: Skin is warm.      Capillary Refill: Capillary refill takes less than 2 seconds.   Neurological:      Mental Status: She is alert.   Psychiatric:         Mood and Affect: Mood normal.         Behavior: Behavior normal.         Thought Content: Thought content normal.         Judgment: Judgment normal.            Significant Labs:  CBC:  Recent Labs   Lab 07/12/24  0755   WBC 13.66*   RBC 4.14   HGB 11.7*   HCT 35.5*      MCV 86   MCH 28.3   MCHC 33.0     BNP:  Recent Labs   Lab 07/12/24  0755   *     CMP:  Recent Labs   Lab 07/12/24  0755   *   CALCIUM 8.4*   ALBUMIN 3.1*   PROT 6.3      K 3.7   CO2 21*      BUN 14   CREATININE 0.9   ALKPHOS 107   ALT 15   AST 16   BILITOT 0.5      Coagulation:   No results for input(s): "PT", "INR", "APTT" in the last 168 hours.  LDH:  No results for input(s): "LDH" in the last 72 hours.  Microbiology:  Microbiology Results (last 7 days)       ** No results found for the last 168 hours. **            I have reviewed all pertinent labs within the past 24 hours.    Estimated Creatinine Clearance: 83.6 mL/min (based on SCr of 0.9 mg/dL).    Diagnostic Results:  I have reviewed and interpreted all pertinent imaging results/findings within the past 24 hours.  "

## 2024-07-15 DIAGNOSIS — I27.21 WHO GROUP 1 PULMONARY ARTERIAL HYPERTENSION: Primary | ICD-10-CM

## 2024-07-15 RX ORDER — MACITENTAN AND TADALAFIL 10; 40 MG/1; MG/1
1 TABLET, FILM COATED ORAL DAILY
Qty: 30 TABLET | Refills: 11 | Status: SHIPPED | OUTPATIENT
Start: 2024-07-15

## 2024-07-15 RX ORDER — MACITENTAN AND TADALAFIL 10; 40 MG/1; MG/1
1 TABLET, FILM COATED ORAL DAILY
Qty: 30 TABLET | Refills: 11 | Status: SHIPPED | OUTPATIENT
Start: 2024-07-15 | End: 2024-07-15

## 2024-07-16 ENCOUNTER — DOCUMENTATION ONLY (OUTPATIENT)
Dept: TRANSPLANT | Facility: CLINIC | Age: 34
End: 2024-07-16
Payer: COMMERCIAL

## 2024-07-16 NOTE — PROGRESS NOTES
Pt CardioMems transmission received and review.          7/16/2024     8:53 AM 7/16/2024     8:52 AM 7/8/2024     9:45 AM 7/8/2024     9:42 AM 7/8/2024     9:41 AM   CardioMEMS Transmission    Transmission Date 7/16/2024 7/10/2024 7/8/2024 7/5/2024 7/1/2024   Transmission Type Maintenance Maintenance Maintenance Maintenance Maintenance   PAS 50 50 53 56 36   ALEKSANDER 32 32 33 34 27   PAD  24 24 23 22 21   Medication Adjustments  No No No No No           Medications changed? No    Patient contacted? No    Will continue to monitor.

## 2024-07-18 ENCOUNTER — DOCUMENTATION ONLY (OUTPATIENT)
Dept: TRANSPLANT | Facility: CLINIC | Age: 34
End: 2024-07-18
Payer: COMMERCIAL

## 2024-07-18 ENCOUNTER — PATIENT MESSAGE (OUTPATIENT)
Dept: TRANSPLANT | Facility: CLINIC | Age: 34
End: 2024-07-18
Payer: COMMERCIAL

## 2024-07-18 LAB
FINAL PATHOLOGIC DIAGNOSIS: NORMAL
Lab: NORMAL

## 2024-07-18 NOTE — PROGRESS NOTES
Pt CardioMems transmission received and review.          7/18/2024    10:15 AM 7/16/2024     8:53 AM 7/16/2024     8:52 AM 7/8/2024     9:45 AM 7/8/2024     9:42 AM 7/8/2024     9:41 AM   CardioMEMS Transmission    Transmission Date 7/18/2024 7/16/2024 7/10/2024 7/8/2024 7/5/2024 7/1/2024   Transmission Type Maintenance Maintenance Maintenance Maintenance Maintenance Maintenance   PAS 54 50 50 53 56 36   ALEKSANDER 33 32 32 33 34 27   PAD  24 24 24 23 22 21   Medication Adjustments  No No No No No No         Pressures are stable.    Medications changed? No    Patient contacted? No    Will continue to monitor.

## 2024-07-19 LAB
COMMENT: NORMAL
FINAL PATHOLOGIC DIAGNOSIS: NORMAL
GROSS: NORMAL
Lab: NORMAL
MICROSCOPIC EXAM: NORMAL

## 2024-07-22 ENCOUNTER — DOCUMENTATION ONLY (OUTPATIENT)
Dept: TRANSPLANT | Facility: CLINIC | Age: 34
End: 2024-07-22
Payer: COMMERCIAL

## 2024-07-22 NOTE — PROGRESS NOTES
Pt CardioMems transmission received and review.          7/22/2024     7:52 AM 7/18/2024    10:15 AM 7/16/2024     8:53 AM 7/16/2024     8:52 AM 7/8/2024     9:45 AM 7/8/2024     9:42 AM 7/8/2024     9:41 AM   CardioMEMS Transmission    Transmission Date 7/22/2024 7/18/2024 7/16/2024 7/10/2024 7/8/2024 7/5/2024 7/1/2024   Transmission Type Maintenance Maintenance Maintenance Maintenance Maintenance Maintenance Maintenance   PAS 50 54 50 50 53 56 36   ALEKSANDER 31 33 32 32 33 34 27   PAD  21 24 24 24 23 22 21   Medication Adjustments  No No No No No No No           Medications changed? No    Patient contacted? No    Will continue to monitor.

## 2024-07-23 ENCOUNTER — TELEPHONE (OUTPATIENT)
Dept: TRANSPLANT | Facility: CLINIC | Age: 34
End: 2024-07-23
Payer: COMMERCIAL

## 2024-07-23 NOTE — TELEPHONE ENCOUNTER
----- Message from Mya Schmidt sent at 7/23/2024  9:51 AM CDT -----  Regarding: VIVIAN Hernandez at [facility] called to let you know that pt had a hysterectomy on 7/11/24 and her reproductive status needs to be updated into [facility] before they can mail her rx for Opsynvi.    Thank you

## 2024-07-25 RX ORDER — ASPIRIN 81 MG/1
81 TABLET ORAL
Qty: 90 TABLET | Refills: 3 | Status: SHIPPED | OUTPATIENT
Start: 2024-07-25

## 2024-07-29 ENCOUNTER — DOCUMENTATION ONLY (OUTPATIENT)
Dept: TRANSPLANT | Facility: CLINIC | Age: 34
End: 2024-07-29
Payer: COMMERCIAL

## 2024-07-29 RX ORDER — SPIRONOLACTONE 25 MG/1
25 TABLET ORAL EVERY MORNING
Qty: 30 TABLET | Refills: 11 | Status: SHIPPED | OUTPATIENT
Start: 2024-07-29

## 2024-07-29 NOTE — PROGRESS NOTES
Pt CardioMems transmission received and review.          7/29/2024     3:12 PM 7/29/2024     3:11 PM 7/22/2024     7:52 AM 7/18/2024    10:15 AM 7/16/2024     8:53 AM 7/16/2024     8:52 AM 7/8/2024     9:45 AM   CardioMEMS Transmission    Transmission Date 7/29/2024 7/26/2024 7/22/2024 7/18/2024 7/16/2024 7/10/2024 7/8/2024   Transmission Type Maintenance Maintenance Maintenance Maintenance Maintenance Maintenance Maintenance   PAS 47 52 50 54 50 50 53   ALEKSANDER 28 30 31 33 32 32 33   PAD  19 19 21 24 24 24 23   Medication Adjustments  No No No No No No No         Pressures are stable.    Medications changed? No    Patient contacted? No    Will continue to monitor.

## 2024-08-05 ENCOUNTER — DOCUMENTATION ONLY (OUTPATIENT)
Dept: TRANSPLANT | Facility: CLINIC | Age: 34
End: 2024-08-05
Payer: COMMERCIAL

## 2024-08-06 ENCOUNTER — OFFICE VISIT (OUTPATIENT)
Dept: GYNECOLOGIC ONCOLOGY | Facility: CLINIC | Age: 34
End: 2024-08-06
Payer: COMMERCIAL

## 2024-08-06 VITALS
WEIGHT: 187.38 LBS | DIASTOLIC BLOOD PRESSURE: 69 MMHG | BODY MASS INDEX: 36.79 KG/M2 | SYSTOLIC BLOOD PRESSURE: 99 MMHG | HEART RATE: 85 BPM | HEIGHT: 60 IN

## 2024-08-06 DIAGNOSIS — I27.9 CHRONIC PULMONARY HEART DISEASE: ICD-10-CM

## 2024-08-06 DIAGNOSIS — I50.33 ACUTE ON CHRONIC CONGESTIVE HEART FAILURE WITH RIGHT VENTRICULAR DIASTOLIC DYSFUNCTION: ICD-10-CM

## 2024-08-06 DIAGNOSIS — E89.40 SURGICAL MENOPAUSE: ICD-10-CM

## 2024-08-06 DIAGNOSIS — N94.89 ADNEXAL MASS: Primary | ICD-10-CM

## 2024-08-06 DIAGNOSIS — I27.20 PULMONARY HYPERTENSION: ICD-10-CM

## 2024-08-06 DIAGNOSIS — I07.1 SEVERE TRICUSPID REGURGITATION: ICD-10-CM

## 2024-08-06 DIAGNOSIS — I50.82 ACUTE ON CHRONIC CONGESTIVE HEART FAILURE WITH RIGHT VENTRICULAR DIASTOLIC DYSFUNCTION: ICD-10-CM

## 2024-08-06 PROCEDURE — 99024 POSTOP FOLLOW-UP VISIT: CPT | Mod: S$GLB,,, | Performed by: OBSTETRICS & GYNECOLOGY

## 2024-08-06 PROCEDURE — 99999 PR PBB SHADOW E&M-EST. PATIENT-LVL III: CPT | Mod: PBBFAC,,, | Performed by: OBSTETRICS & GYNECOLOGY

## 2024-08-06 RX ORDER — ESTRADIOL 0.05 MG/D
1 PATCH TRANSDERMAL
Qty: 4 PATCH | Refills: 10 | Status: SHIPPED | OUTPATIENT
Start: 2024-08-06 | End: 2024-10-29

## 2024-08-08 ENCOUNTER — DOCUMENTATION ONLY (OUTPATIENT)
Dept: TRANSPLANT | Facility: CLINIC | Age: 34
End: 2024-08-08
Payer: COMMERCIAL

## 2024-08-12 ENCOUNTER — DOCUMENTATION ONLY (OUTPATIENT)
Dept: TRANSPLANT | Facility: CLINIC | Age: 34
End: 2024-08-12
Payer: COMMERCIAL

## 2024-08-12 NOTE — PROGRESS NOTES
Pt CardioMems transmission received and review.          8/12/2024     9:08 AM 8/8/2024     1:40 PM 8/5/2024     9:48 AM 8/5/2024     9:47 AM 7/29/2024     3:12 PM 7/29/2024     3:11 PM 7/22/2024     7:52 AM   CardioMEMS Transmission    Transmission Date 8/12/2024 8/8/2024 8/5/2024 8/2/2024 7/29/2024 7/26/2024 7/22/2024   Transmission Type Maintenance Maintenance Maintenance Maintenance Maintenance Maintenance Maintenance   PAS 58 52 55 48 47 52 50   ALEKSANDER 35 3.1 34 29 28 30 31   PAD  20 20 22 18 19 19 21   Medication Adjustments  No No No No No No No         Pressures are stable.    Medications changed? No    Patient contacted? No    Will continue to monitor.

## 2024-08-19 ENCOUNTER — DOCUMENTATION ONLY (OUTPATIENT)
Dept: TRANSPLANT | Facility: CLINIC | Age: 34
End: 2024-08-19
Payer: COMMERCIAL

## 2024-08-19 NOTE — PROGRESS NOTES
Pt CardioMems transmission received and review.          8/19/2024    10:51 AM 8/19/2024    10:50 AM 8/12/2024     9:08 AM 8/8/2024     1:40 PM 8/5/2024     9:48 AM 8/5/2024     9:47 AM 7/29/2024     3:12 PM   CardioMEMS Transmission    Transmission Date 8/19/2024 8/14/2024 8/12/2024 8/8/2024 8/5/2024 8/2/2024 7/29/2024   Transmission Type Maintenance Maintenance Maintenance Maintenance Maintenance Maintenance Maintenance   PAS 55 52 58 52 55 48 47   ALEKSANDER 31 31 35 3.1 34 29 28   PAD  18 19 20 20 22 18 19   Medication Adjustments  No No No No No No No         Pressures are stable.    Medications changed? No    Patient contacted? No    Will continue to monitor.

## 2024-08-23 ENCOUNTER — DOCUMENTATION ONLY (OUTPATIENT)
Dept: TRANSPLANT | Facility: CLINIC | Age: 34
End: 2024-08-23
Payer: COMMERCIAL

## 2024-08-23 NOTE — PROGRESS NOTES
Pt CardioMems transmission received and review.          8/23/2024    10:00 AM 8/19/2024    10:51 AM 8/19/2024    10:50 AM 8/12/2024     9:08 AM 8/8/2024     1:40 PM 8/5/2024     9:48 AM 8/5/2024     9:47 AM   CardioMEMS Transmission    Transmission Date 8/23/2024 8/19/2024 8/14/2024 8/12/2024 8/8/2024 8/5/2024 8/2/2024   Transmission Type Maintenance Maintenance Maintenance Maintenance Maintenance Maintenance Maintenance   PAS 50 55 52 58 52 55 48   ALEKSANDER 32 31 31 35 3.1 34 29   PAD  20 18 19 20 20 22 18   Medication Adjustments  No No No No No No No         Pressures are stable.    Medications changed? No    Patient contacted? No    Will continue to monitor.

## 2024-08-26 ENCOUNTER — DOCUMENTATION ONLY (OUTPATIENT)
Dept: TRANSPLANT | Facility: CLINIC | Age: 34
End: 2024-08-26
Payer: COMMERCIAL

## 2024-08-26 NOTE — PROGRESS NOTES
Pt CardioMems transmission received and review.          8/26/2024     8:37 AM 8/23/2024    10:00 AM 8/19/2024    10:51 AM 8/19/2024    10:50 AM 8/12/2024     9:08 AM 8/8/2024     1:40 PM 8/5/2024     9:48 AM   CardioMEMS Transmission    Transmission Date 8/26/2024 8/23/2024 8/19/2024 8/14/2024 8/12/2024 8/8/2024 8/5/2024   Transmission Type Maintenance Maintenance Maintenance Maintenance Maintenance Maintenance Maintenance   PAS 52 50 55 52 58 52 55   ALEKSANDER 31 32 31 31 35 3.1 34   PAD  19 20 18 19 20 20 22   Medication Adjustments  No No No No No No No         Pressures are stable.    Medications changed? No    Patient contacted? No    Will continue to monitor.

## 2024-09-03 ENCOUNTER — DOCUMENTATION ONLY (OUTPATIENT)
Dept: TRANSPLANT | Facility: CLINIC | Age: 34
End: 2024-09-03
Payer: COMMERCIAL

## 2024-09-03 NOTE — PROGRESS NOTES
Pt CardioMems transmission received and review.          9/3/2024     8:01 AM 9/3/2024     8:00 AM 8/26/2024     8:37 AM 8/23/2024    10:00 AM 8/19/2024    10:51 AM 8/19/2024    10:50 AM 8/12/2024     9:08 AM   CardioMEMS Transmission    Transmission Date 9/3/2024 8/30/2024 8/26/2024 8/23/2024 8/19/2024 8/14/2024 8/12/2024   Transmission Type Maintenance Maintenance Maintenance Maintenance Maintenance Maintenance Maintenance   PAS 50 50 52 50 55 52 58   ALEKSANDER 29 29 31 32 31 31 35   PAD  16 18 19 20 18 19 20   Medication Adjustments  No No No No No No No         Medications changed? No    Patient contacted? No    Will continue to monitor.

## 2024-09-05 ENCOUNTER — DOCUMENTATION ONLY (OUTPATIENT)
Dept: TRANSPLANT | Facility: CLINIC | Age: 34
End: 2024-09-05
Payer: COMMERCIAL

## 2024-09-05 NOTE — PROGRESS NOTES
Pt CardioMems transmission received and review.          9/5/2024     1:56 PM 9/3/2024     8:01 AM 9/3/2024     8:00 AM 8/26/2024     8:37 AM 8/23/2024    10:00 AM 8/19/2024    10:51 AM 8/19/2024    10:50 AM   CardioMEMS Transmission    Transmission Date 9/5/2024 9/3/2024 8/30/2024 8/26/2024 8/23/2024 8/19/2024 8/14/2024   Transmission Type Maintenance Maintenance Maintenance Maintenance Maintenance Maintenance Maintenance   PAS 60 50 50 52 50 55 52   ALEKSANDER 36 29 29 31 32 31 31   PAD  21 16 18 19 20 18 19   Medication Adjustments  No No No No No No No           Medications changed? No    Patient contacted? No    Will continue to monitor.

## 2024-09-09 ENCOUNTER — DOCUMENTATION ONLY (OUTPATIENT)
Dept: TRANSPLANT | Facility: CLINIC | Age: 34
End: 2024-09-09
Payer: COMMERCIAL

## 2024-09-09 NOTE — PROGRESS NOTES
Pt CardioMems transmission received and review.          9/9/2024     8:16 AM 9/5/2024     1:56 PM 9/3/2024     8:01 AM 9/3/2024     8:00 AM 8/26/2024     8:37 AM 8/23/2024    10:00 AM 8/19/2024    10:51 AM   CardioMEMS Transmission    Transmission Date 9/9/2024 9/5/2024 9/3/2024 8/30/2024 8/26/2024 8/23/2024 8/19/2024   Transmission Type Maintenance Maintenance Maintenance Maintenance Maintenance Maintenance Maintenance   PAS 51 60 50 50 52 50 55   ALEKSANDER 30 36 29 29 31 32 31   PAD  18 21 16 18 19 20 18   Medication Adjustments  No No No No No No No           Medications changed? No    Patient contacted? No    Will continue to monitor.

## 2024-09-13 ENCOUNTER — DOCUMENTATION ONLY (OUTPATIENT)
Dept: TRANSPLANT | Facility: CLINIC | Age: 34
End: 2024-09-13
Payer: COMMERCIAL

## 2024-09-13 NOTE — PROGRESS NOTES
Pt CardioMems transmission received and review.          9/13/2024     8:57 AM 9/9/2024     8:16 AM 9/5/2024     1:56 PM 9/3/2024     8:01 AM 9/3/2024     8:00 AM 8/26/2024     8:37 AM 8/23/2024    10:00 AM   CardioMEMS Transmission    Transmission Date 9/10/2024 9/9/2024 9/5/2024 9/3/2024 8/30/2024 8/26/2024 8/23/2024   Transmission Type Maintenance Maintenance Maintenance Maintenance Maintenance Maintenance Maintenance   PAS 47 51 60 50 50 52 50   ALEKSANDER 29 30 36 29 29 31 32   PAD  20 18 21 16 18 19 20   Medication Adjustments  No No No No No No No         Pressures are stable.    Medications changed? No    Patient contacted? No    Will continue to monitor.

## 2024-09-16 ENCOUNTER — DOCUMENTATION ONLY (OUTPATIENT)
Dept: TRANSPLANT | Facility: CLINIC | Age: 34
End: 2024-09-16
Payer: COMMERCIAL

## 2024-09-16 NOTE — PROGRESS NOTES
Pt CardioMems transmission received and review.          9/16/2024     9:56 AM 9/13/2024     8:57 AM 9/9/2024     8:16 AM 9/5/2024     1:56 PM 9/3/2024     8:01 AM 9/3/2024     8:00 AM 8/26/2024     8:37 AM   CardioMEMS Transmission    Transmission Date 9/16/2024 9/10/2024 9/9/2024 9/5/2024 9/3/2024 8/30/2024 8/26/2024   Transmission Type Maintenance Maintenance Maintenance Maintenance Maintenance Maintenance Maintenance   PAS 51 47 51 60 50 50 52   ALEKSANDER 31 29 30 36 29 29 31   PAD  19 20 18 21 16 18 19   Medication Adjustments  No No No No No No No         Pressures are stable.    Medications changed? No    Patient contacted? No    Will continue to monitor.

## 2024-09-19 ENCOUNTER — DOCUMENTATION ONLY (OUTPATIENT)
Dept: TRANSPLANT | Facility: CLINIC | Age: 34
End: 2024-09-19
Payer: COMMERCIAL

## 2024-09-19 NOTE — PROGRESS NOTES
Pt CardioMems transmission received and review.          9/19/2024    10:58 AM 9/16/2024     9:56 AM 9/13/2024     8:57 AM 9/9/2024     8:16 AM 9/5/2024     1:56 PM 9/3/2024     8:01 AM 9/3/2024     8:00 AM   CardioMEMS Transmission    Transmission Date 9/19/2024 9/16/2024 9/10/2024 9/9/2024 9/5/2024 9/3/2024 8/30/2024   Transmission Type Maintenance Maintenance Maintenance Maintenance Maintenance Maintenance Maintenance   PAS 50 51 47 51 60 50 50   ALEKSANDER 31 31 29 30 36 29 29   PAD  20 19 20 18 21 16 18   Medication Adjustments  No No No No No No No         Pressures are stable.    Medications changed? No    Patient contacted? No    Will continue to monitor.

## 2024-09-23 ENCOUNTER — DOCUMENTATION ONLY (OUTPATIENT)
Dept: TRANSPLANT | Facility: CLINIC | Age: 34
End: 2024-09-23
Payer: COMMERCIAL

## 2024-09-23 NOTE — PROGRESS NOTES
Pt CardioMems transmission received and review.          9/23/2024     9:12 AM 9/19/2024    10:58 AM 9/16/2024     9:56 AM 9/13/2024     8:57 AM 9/9/2024     8:16 AM 9/5/2024     1:56 PM 9/3/2024     8:01 AM   CardioMEMS Transmission    Transmission Date 9/23/2024 9/19/2024 9/16/2024 9/10/2024 9/9/2024 9/5/2024 9/3/2024   Transmission Type Maintenance Maintenance Maintenance Maintenance Maintenance Maintenance Maintenance   PAS 52 50 51 47 51 60 50   ALEKSANDER 32 31 31 29 30 36 29   PAD  21 20 19 20 18 21 16   Medication Adjustments  No No No No No No No         Pressures are stable.    Medications changed? No    Patient contacted? No    Will continue to monitor.

## 2024-10-01 ENCOUNTER — PATIENT MESSAGE (OUTPATIENT)
Dept: OBSTETRICS AND GYNECOLOGY | Facility: CLINIC | Age: 34
End: 2024-10-01
Payer: COMMERCIAL

## 2024-10-04 ENCOUNTER — OFFICE VISIT (OUTPATIENT)
Dept: TRANSPLANT | Facility: CLINIC | Age: 34
End: 2024-10-04
Payer: COMMERCIAL

## 2024-10-04 ENCOUNTER — LAB VISIT (OUTPATIENT)
Dept: LAB | Facility: HOSPITAL | Age: 34
End: 2024-10-04
Attending: INTERNAL MEDICINE
Payer: COMMERCIAL

## 2024-10-04 ENCOUNTER — HOSPITAL ENCOUNTER (OUTPATIENT)
Dept: PULMONOLOGY | Facility: CLINIC | Age: 34
Discharge: HOME | End: 2024-10-04
Payer: COMMERCIAL

## 2024-10-04 ENCOUNTER — TELEPHONE (OUTPATIENT)
Dept: TRANSPLANT | Facility: CLINIC | Age: 34
End: 2024-10-04

## 2024-10-04 VITALS
BODY MASS INDEX: 38.26 KG/M2 | RESPIRATION RATE: 18 BRPM | HEART RATE: 82 BPM | DIASTOLIC BLOOD PRESSURE: 62 MMHG | HEIGHT: 60 IN | OXYGEN SATURATION: 95 % | WEIGHT: 190 LBS | WEIGHT: 194.88 LBS | SYSTOLIC BLOOD PRESSURE: 117 MMHG | HEIGHT: 60 IN | BODY MASS INDEX: 37.3 KG/M2

## 2024-10-04 DIAGNOSIS — Z79.899 HIGH RISK MEDICATION USE: ICD-10-CM

## 2024-10-04 DIAGNOSIS — E07.9 THYROID DISEASE: ICD-10-CM

## 2024-10-04 DIAGNOSIS — R06.82 TACHYPNEA: ICD-10-CM

## 2024-10-04 DIAGNOSIS — I27.21 WHO GROUP 1 PULMONARY ARTERIAL HYPERTENSION: Primary | ICD-10-CM

## 2024-10-04 DIAGNOSIS — I50.82 ACUTE ON CHRONIC CONGESTIVE HEART FAILURE WITH RIGHT VENTRICULAR DIASTOLIC DYSFUNCTION: ICD-10-CM

## 2024-10-04 DIAGNOSIS — I50.33 ACUTE ON CHRONIC CONGESTIVE HEART FAILURE WITH RIGHT VENTRICULAR DIASTOLIC DYSFUNCTION: ICD-10-CM

## 2024-10-04 DIAGNOSIS — Z79.899 POLYPHARMACY: ICD-10-CM

## 2024-10-04 DIAGNOSIS — I27.9 CHRONIC PULMONARY HEART DISEASE: ICD-10-CM

## 2024-10-04 LAB
ALBUMIN SERPL BCP-MCNC: 4.4 G/DL (ref 3.5–5.2)
ALP SERPL-CCNC: 202 U/L (ref 55–135)
ALT SERPL W/O P-5'-P-CCNC: 45 U/L (ref 10–44)
ANION GAP SERPL CALC-SCNC: 11 MMOL/L (ref 8–16)
AST SERPL-CCNC: 37 U/L (ref 10–40)
BASOPHILS # BLD AUTO: 0.04 K/UL (ref 0–0.2)
BASOPHILS NFR BLD: 0.5 % (ref 0–1.9)
BILIRUB SERPL-MCNC: 0.7 MG/DL (ref 0.1–1)
BNP SERPL-MCNC: 48 PG/ML (ref 0–99)
BUN SERPL-MCNC: 17 MG/DL (ref 6–20)
CALCIUM SERPL-MCNC: 10.2 MG/DL (ref 8.7–10.5)
CHLORIDE SERPL-SCNC: 100 MMOL/L (ref 95–110)
CO2 SERPL-SCNC: 30 MMOL/L (ref 23–29)
CREAT SERPL-MCNC: 0.8 MG/DL (ref 0.5–1.4)
DIFFERENTIAL METHOD BLD: ABNORMAL
EOSINOPHIL # BLD AUTO: 0.3 K/UL (ref 0–0.5)
EOSINOPHIL NFR BLD: 4.1 % (ref 0–8)
ERYTHROCYTE [DISTWIDTH] IN BLOOD BY AUTOMATED COUNT: 13 % (ref 11.5–14.5)
EST. GFR  (NO RACE VARIABLE): >60 ML/MIN/1.73 M^2
GLUCOSE SERPL-MCNC: 95 MG/DL (ref 70–110)
HCT VFR BLD AUTO: 46.8 % (ref 37–48.5)
HGB BLD-MCNC: 14.8 G/DL (ref 12–16)
IMM GRANULOCYTES # BLD AUTO: 0.03 K/UL (ref 0–0.04)
IMM GRANULOCYTES NFR BLD AUTO: 0.4 % (ref 0–0.5)
LYMPHOCYTES # BLD AUTO: 1.2 K/UL (ref 1–4.8)
LYMPHOCYTES NFR BLD: 16.3 % (ref 18–48)
MAGNESIUM SERPL-MCNC: 2.2 MG/DL (ref 1.6–2.6)
MCH RBC QN AUTO: 26.1 PG (ref 27–31)
MCHC RBC AUTO-ENTMCNC: 31.6 G/DL (ref 32–36)
MCV RBC AUTO: 82 FL (ref 82–98)
MONOCYTES # BLD AUTO: 0.6 K/UL (ref 0.3–1)
MONOCYTES NFR BLD: 7.8 % (ref 4–15)
NEUTROPHILS # BLD AUTO: 5.4 K/UL (ref 1.8–7.7)
NEUTROPHILS NFR BLD: 70.9 % (ref 38–73)
NRBC BLD-RTO: 0 /100 WBC
PLATELET # BLD AUTO: 283 K/UL (ref 150–450)
PMV BLD AUTO: 10.9 FL (ref 9.2–12.9)
POTASSIUM SERPL-SCNC: 4.3 MMOL/L (ref 3.5–5.1)
PROT SERPL-MCNC: 8.6 G/DL (ref 6–8.4)
RBC # BLD AUTO: 5.68 M/UL (ref 4–5.4)
SODIUM SERPL-SCNC: 141 MMOL/L (ref 136–145)
WBC # BLD AUTO: 7.56 K/UL (ref 3.9–12.7)

## 2024-10-04 PROCEDURE — 83735 ASSAY OF MAGNESIUM: CPT | Performed by: INTERNAL MEDICINE

## 2024-10-04 PROCEDURE — 36415 COLL VENOUS BLD VENIPUNCTURE: CPT | Performed by: INTERNAL MEDICINE

## 2024-10-04 PROCEDURE — 99999 PR PBB SHADOW E&M-EST. PATIENT-LVL III: CPT | Mod: PBBFAC,,,

## 2024-10-04 PROCEDURE — 83880 ASSAY OF NATRIURETIC PEPTIDE: CPT | Performed by: INTERNAL MEDICINE

## 2024-10-04 PROCEDURE — 85025 COMPLETE CBC W/AUTO DIFF WBC: CPT | Performed by: INTERNAL MEDICINE

## 2024-10-04 PROCEDURE — 80053 COMPREHEN METABOLIC PANEL: CPT | Performed by: INTERNAL MEDICINE

## 2024-10-04 PROCEDURE — 94618 PULMONARY STRESS TESTING: CPT | Mod: S$GLB,,, | Performed by: INTERNAL MEDICINE

## 2024-10-04 NOTE — PROGRESS NOTES
Patient ID:  Mya Sarmiento is a 34 y.o. female who presents for follow-up of Pulmonary Hypertension.    HPI   Mrs. Sarmiento was referred by Dr. Magana who saw patient on 12/15/2022. Mrs. Sarmiento has a history of thyroid disease, obesity, and severe PAH by RHC performed on 12/1/2022. Has a smoking history - quit 7 years ago. Was diagnosed with ovarian cysts following c/o pain in L side. Had tests done pre-procedure with ECHO concerning for severe PAH, followed by L/RHC that was consistent with severe PAH and no CAD noted. Other tests include negative V/Q and normal CXR. CT and PFTs have been completed but need to obtain.  Denies hx of heart disease or clotting disorder, illicit drug use, diet pills, CTD, or autoimmune disorders. Denies concerning family history. She has a 10 year-old son (uneventful birth) and 19 year-old stepson.  At initial clinic visit, Mrs. Sarmiento reported being generally healthy. Reported NYHA FC I symptoms. She had lost 80 lbs over the last year with diet and exercise. She exercises 3x a week for 30 minutes to an hour doing bike, swim, stepper, hula hoop.   Right heart cath in March showed severe PAH and she was directly admitted to hospital for initiation of SC remodulin therapy. She was discharged on SC Remodulin and Opsumit.  Mrs. Sarmiento has had a cardioMems device placed and completed the Cereno study. She is currently stable on Remodulin (40 ng/kg/min) and Opsumit 10 mg daily, per last visit with Dr. Rolon (12/2023).    Mrs. Sarmiento is here for clinic f/u. She feels great. Walked 426m. Does not use supplemental O2. Had  successful hysterectomy and excited to not have pain or excessive bleeding which have plagued her since she was 15. She does endorse weight gain after the procedure. She is tolerating her PH meds. Denies site pain and other SEs are managed. She is able to do all ADLs/IADLs. Manages fluid with lasix. Patient denies chest pain, chest pressure, syncope, pre-syncope, lightheadedness,  dizziness, PND, orthopnea, LE edema, abdominal pain, abdominal pressure, or N/V/F/C.      6MWT:   6/18/2024 10/4/2024   6MW     6MWT Status completed without stopping  completed without stopping    Patient Reported No complaints  No complaints    Was O2 used? No  No    6MW Distance walked (feet) 1362 feet  1400 feet    Distance walked (meters) 415.14 meters  426.72 meters    Did patient stop? No  No    Oxygen Saturation 99 %  95 %    Supplemental Oxygen Room Air  Room Air    Heart Rate 83 bpm  93 bpm    Blood Pressure 119/75  116/83    Ayala Dyspnea Rating  nothing at all  nothing at all    Oxygen Saturation 98 %  98 %    Supplemental Oxygen Room Air  Room Air    Heart Rate 116 bpm  128 bpm    Blood Pressure 136/84  141/82    Ayala Dyspnea Rating  very light  very, very light (just noticeable)    Recovery Time (seconds) 73 seconds  98 seconds    Oxygen Saturation 99 %  99 %    Supplemental Oxygen Room Air  Room Air    Heart Rate 93 bpm  108 bpm              9/23/2024     9:12 AM 9/19/2024    10:58 AM 9/16/2024     9:56 AM 9/13/2024     8:57 AM 9/9/2024     8:16 AM 9/5/2024     1:56 PM 9/3/2024     8:01 AM   CardioMEMS Transmission    Transmission Date 9/23/2024 9/19/2024 9/16/2024 9/10/2024 9/9/2024 9/5/2024 9/3/2024   Transmission Type Maintenance Maintenance Maintenance Maintenance Maintenance Maintenance Maintenance   PAS 52 50 51 47 51 60 50   ALEKSANDER 32 31 31 29 30 36 29   PAD  21 20 19 20 18 21 16   Medication Adjustments               ECHO: 6/18/2024    Left Ventricle: The left ventricle is normal in size. Normal wall thickness. Normal wall motion. Septal flattening in systole consistent with right ventricular pressure overload. There is normal systolic function with a visually estimated ejection fraction of 55 - 60%. There is normal diastolic function.    Right Ventricle: Mild right ventricular enlargement. Systolic function is mildly reduced. Right ventricular global longitudinal strain is - 17.6%. Free wall  strain 17.7%.    Aortic Valve: There is mild aortic regurgitation.    Mitral Valve: There is mild regurgitation.    Tricuspid Valve: There is mild to moderate regurgitation.    Pulmonary Artery: The estimated pulmonary artery systolic pressure is 66 mmHg.    IVC/SVC: Normal venous pressure at 3 mmHg.    ECHO: 2/1/2023  The left ventricle is normal in size with concentric remodeling and normal systolic function.  The estimated ejection fraction is 60%.  Normal left ventricular diastolic function.  There is abnormal septal wall motion.  Moderate right ventricular enlargement with low normal right ventricular systolic function.  Moderate right atrial enlargement.  Mild aortic regurgitation.  Severe tricuspid regurgitation.  Intermediate central venous pressure (8 mmHg).  The estimated PA systolic pressure is 86 mmHg.  There is severe pulmonary hypertension.  Trivial pericardial effusion. Under the RA.    ECHO:           RHC: 12/12/2023  RA: 8 PA: 93/ 26/ 51 PWP: 13 .   Cardiac output was 5 by Tesfaye. Cardiac index is 2.9 L/min/m2.   Pulmonary vascular resistance: 8.     RHC: 2/1/2023  RA: 27/ 27/ 24 RV: 94/ 8/ 24 PA: 94/ 40/ 58 PWP: 10/ 27/ 12 .   Cardiac output was 4.8  by Tesfaye. Cardiac index is 2.6 L/min/m2.   O2 Sat: PA 62%.   Pulmonary vascular resistance: 9.6 CRAWFORD.   Condition 2: Peter at 20 ppm.  PA 97/39 (62)  PCWP 15, 27 (14)    Condition 3 Peter 40 ppm.  PA 96/37 (61)  PCWP 15, 21 (14)    Condition 4: Peter 80 ppm.  PA 96/38 (61)  PCWP 15/20 (14)  CO 5.5 l/min CI 3 l/min/m2    L/RHC: 12/1/2022  Procedure:  Left heart catheterization right heart catheterization shunt run and assessment of pulmonary vasoreactivity  Pre Op Diagnosis:  Pulmonary arterial hypertension  Post Op Diagnosis:  Same  Indications:  Same  Physician: Dr. Kee Magana MD  Entry:  Left radial artery and left brachial vein  Findings:  Coronary angiography performed initially.    All coronary arteries are normal.    Normal origin of the left and right  coronary ostium   Left main is widely patent  Lad and diagonal branches are widely patent   Circumflex and marginal branches are widely  Right coronary is a dominant vessel  Right coronary has a normal origin and is widely patent   The PDA and posterolateral branches are widely patent  There is no coronary fistula or coronary anomaly     LV function is normal on LV g  No gradient on pullback and no intraventricular gradient  No mitral regurgitation seen     Right heart catheterization was then performed and shunt run was performed.     Superior vena cava 71%  Right atrium 68%  Inferior vena cava 67%  Right ventricle 67%  Right ventricular outflow tract 69%   Main pulmonary artery 62%   Right upper pulmonary artery 65%  Left ventricle 94%   Aorta/subclavian 92%       LV pressure 127/20  Aortic pressure is 127/93  Wedge pressure 20   PA pressure is 110/48  RV pressure 110/36   RA pressure 40  Cardiac output 4.3 average over 3 measurements  Cardiac index 2.3     PVR = 11.8 Woods units     BSA  2.0 m2     Patient was then examined after giving supplemental oxygen for 5 minutes and there was no significant change in her pulmonary pressures.    Patient was examined after IV adenosine given at sequential dosing in 50 mcg increments peaking at a 250 microgram/kilogram per minute dosing   There does not appear to be any significant change in the pulmonary pressures indicating lack of pulmonary vaso reactivity.    V/Q SCAN: 12/13/2022  FINDINGS:  No ventilatory defects.     No perfusion defects.     Impression:     Low probability for pulmonary embolism.    CXR: 12/13/2022  FINDINGS:  Frontal chest radiograph demonstrates clear lungs.  No pleural fluid.  Cardiomediastinal silhouette is unremarkable.  No osseous abnormality.     Impression:     No evidence of cardiopulmonary disease.    PFTs: 5/22/2023  No obstruction. Mild Restriction. Reduction in DLCO (24%).     CT CHEST: 11/3/2022       6/28/2023   Pulmonary Hypertension  Review Flowsheet    Medication Type SQ Remodulin    Dose (ng/kg/min) 40.65 ng/kg/min    Vial Size 10 mg/ml    DW (kg)  82 kg    Amt of Med used (ml) 1.8 ml    Pump Rate 20 nl/hr      Review of Systems   Constitutional: Positive for weight gain (post-hysterectomy).   HENT: Negative.     Eyes: Negative.    Cardiovascular: Negative.    Respiratory: Negative.     Endocrine: Negative.    Hematologic/Lymphatic: Negative.    Skin: Negative.    Musculoskeletal: Negative.    Gastrointestinal: Negative.    Genitourinary: Negative.    Neurological: Negative.    Psychiatric/Behavioral: Negative.     Allergic/Immunologic: Negative.         Objective: /62 (BP Location: Right arm, Patient Position: Sitting)   Pulse 82   Resp 18   Ht 5' (1.524 m)   Wt 88.4 kg (194 lb 14.2 oz)   SpO2 95%   BMI 38.06 kg/m²      Physical Exam  Constitutional:       Appearance: Normal appearance.   HENT:      Head: Normocephalic.   Cardiovascular:      Rate and Rhythm: Normal rate and regular rhythm.      Pulses: Normal pulses.      Heart sounds: Normal heart sounds.   Pulmonary:      Effort: Pulmonary effort is normal.      Breath sounds: Normal breath sounds.   Abdominal:      Palpations: Abdomen is soft.   Musculoskeletal:         General: Normal range of motion.      Cervical back: Normal range of motion.   Skin:     General: Skin is warm and dry.      Capillary Refill: Capillary refill takes less than 2 seconds.   Neurological:      Mental Status: She is oriented to person, place, and time.   Psychiatric:         Mood and Affect: Mood normal.         Behavior: Behavior normal.            Lab Results   Component Value Date    BNP 48 10/04/2024     10/04/2024    K 4.3 10/04/2024    MG 2.2 10/04/2024     10/04/2024    CO2 30 (H) 10/04/2024    BUN 17 10/04/2024    CREATININE 0.8 10/04/2024    GLU 95 10/04/2024    AST 37 10/04/2024    ALT 45 (H) 10/04/2024    ALBUMIN 4.4 10/04/2024    PROT 8.6 (H) 10/04/2024    BILITOT 0.7  10/04/2024       Magnesium   Date Value Ref Range Status   10/04/2024 2.2 1.6 - 2.6 mg/dL Final       Lab Results   Component Value Date    WBC 7.56 10/04/2024    HGB 14.8 10/04/2024    HCT 46.8 10/04/2024    MCV 82 10/04/2024     10/04/2024       BNP   Date Value Ref Range Status   10/04/2024 48 0 - 99 pg/mL Final     Comment:     Values of less than 100 pg/ml are consistent with non-CHF populations.   07/12/2024 188 (H) 0 - 99 pg/mL Final     Comment:     Values of less than 100 pg/ml are consistent with non-CHF populations.   06/17/2024 50 0 - 99 pg/mL Final     Comment:     Values of less than 100 pg/ml are consistent with non-CHF populations.         ..  WHO Group: 1  Functional Class: I  REVEAL Score: 3 (Low Risk)    Assessment:       1. WHO group 1 pulmonary arterial hypertension    2. Acute on chronic congestive heart failure with right ventricular diastolic dysfunction    3. BMI 38.0-38.9,adult    4. Thyroid disease    5. High risk medication use - SC Remodulin           Plan:   Mrs. Sarmiento is doing very well on current therapy - 40 ng/kg/min and Opsynvi (tadalafil 40 mg + Opsumit 10mg). Denies cardiopulmonary symptoms. She is FC I and low risk.   Readings from her [facility] show improved PA pressures -around 50 systolic, mean 30.     Discussed the medication Winrevair (sotatercept). She is very interested in this medication. Mrs. Sarmiento would like to come off of the pump, someday,if possible.    No medication changes today.    Will apply for Winrevair. PH RN Coordinator educated patient, in clinic.    Follow-up will be determined based on SOC of Winrevair.    Recommend 2 gram sodium restriction and 1500cc fluid restriction.  Encourage physical activity with graded exercise program.  Requested patient to weigh themselves daily, and to notify us if their weight increases by more than 3 lbs in 1 day or 5 lbs in 1 week.    Jesusita Carvalho, WILBERT, APRN  [facility]pert[facility]

## 2024-10-04 NOTE — TELEPHONE ENCOUNTER
Per JENNIFER Nunn request, NN met with patient and spouse to discuss the initiation of Winrevair. Winrevair is administered every 3 weeks as a SC injection and dose is based on body weight. Patient will need to have labs (CBC) drawn prior to starting the medication and also before each injection for the next 5 doses. Hgb and platelets must be monitored closely as they can increase due to the medication so dosage modifications may be needed based on results.    Educated patient on application process, role of Specialty Pharmacy and the Specialty pharmacy nurse. Patient was informed that he/she should not start the medication without Specialty pharmacy nurse. PH Coordinator will send paperwork but patient needs to speak with [facility] and [facility]ty Pharmacy to review benefits and arrange shipment of medication. The most common side effects are increased risk of bleeding, telangiectasias, impaired fertility, headaches, nose bleeds, rash, diarrhea, redness, dizziness. Winrevair can cause thromboembolic events or hyperviscosity. It is not recommended for the patient to get pregnant as the medication may cause fetal harm. Patient has had hysterectomy.     Patient was informed that referral will be sent to [facility] and will be processed through insurance. Information pamphlet/website provided to patient on medication information. The patient was also advised to watch video on Winrevair prior to starting the medication as part of patient education. Patient verbalized understanding and had no further questions. Nurse coordinator is available for further questions or concerns at any time, patient verbalized understanding.      Winrevair referral sent to [facility] [facility] Access [facility].

## 2024-10-07 NOTE — PROCEDURES
Mya Sarmiento is a 34 y.o.   female patient, who presents for a 6 minute walk test ordered by MD Gonzales.  The diagnosis is Qualify for Oxygen.  The patient's BMI is 37.1 kg/m2.  Predicted distance (lower limit of normal) is 448.28 meters.      Test Results:    The test was completed without stopping.   The total time walked was 360 seconds.  During walking, the patient reported:  No complaints. The patient used no assistive devices  during testing.     10/06/2024---------Distance: 426.72 meters (1400 feet)     O2 Sat % Supplemental Oxygen Heart Rate Blood Pressure Ayala Scale   Pre-exercise  (Resting) 95 % Room Air 93 bpm 116/83 mmHg 0   During Exercise 98 % Room Air 128 bpm 141/82 mmHg 0.5   Post-exercise  (Recovery) 99 % Room Air  108 bpm 129/82 mmHg       Recovery Time: 98 seconds    Performing nurse/tech: Paige BINGHAM      PREVIOUS STUDY:   The patient had a previous study.    06/18/2024---------Distance: 415.14 meters (1362 feet)       O2 Sat % Supplemental Oxygen Heart Rate Blood Pressure Ayala Scale   Pre-exercise  (Resting) 99 % Room Air 83 bpm 119/75 mmHg 0   During Exercise 98 % Room Air 116 bpm 136/84 mmHg 1   Post-exercise  (Recovery) 99 % Room Air 93 bpm          CLINICAL INTERPRETATION:  Six minute walk distance is 426.72 meters (1400 feet) with very, very light dyspnea.  During exercise, there was no significant desaturation while breathing room air.  Both blood pressure and heart rate remained stable with walking.  The patient did not report non-pulmonary symptoms during exercise.  The patient did complete the study, walking 360 seconds of the 360 second test.  Since the previous study in 6/18/2024, exercise capacity is unchanged.  Based upon age and body mass index, exercise capacity is less than predicted.

## 2024-10-09 ENCOUNTER — DOCUMENTATION ONLY (OUTPATIENT)
Dept: TRANSPLANT | Facility: CLINIC | Age: 34
End: 2024-10-09
Payer: COMMERCIAL

## 2024-10-09 NOTE — PROGRESS NOTES
Pt CardioMems transmission received and review.          10/9/2024    12:50 PM 10/9/2024    12:49 PM 10/9/2024    12:48 PM 10/9/2024    12:47 PM 10/9/2024    12:46 PM 9/23/2024     9:12 AM 9/19/2024    10:58 AM   CardioMEMS Transmission    Transmission Date 10/9/2024 10/7/2024 10/4/2024 9/30/2024 9/27/2024 9/23/2024 9/19/2024   Transmission Type Maintenance Maintenance Maintenance Maintenance Maintenance Maintenance Maintenance   PAS 51 52 49  55 52 50   ALEKSANDER 32 35 32  36 32 31   PAD  19 22 23  24 21 20   Medication Adjustments  No No No No No No No         Pressures are stable.    Medications changed? No    Patient contacted? No    Will continue to monitor.

## 2024-10-14 ENCOUNTER — PATIENT MESSAGE (OUTPATIENT)
Dept: TRANSPLANT | Facility: CLINIC | Age: 34
End: 2024-10-14
Payer: COMMERCIAL

## 2024-10-15 ENCOUNTER — HOSPITAL ENCOUNTER (EMERGENCY)
Facility: HOSPITAL | Age: 34
Discharge: HOME | End: 2024-10-15
Attending: EMERGENCY MEDICINE
Payer: COMMERCIAL

## 2024-10-15 ENCOUNTER — DOCUMENTATION ONLY (OUTPATIENT)
Dept: TRANSPLANT | Facility: CLINIC | Age: 34
End: 2024-10-15
Payer: COMMERCIAL

## 2024-10-15 VITALS
HEART RATE: 73 BPM | OXYGEN SATURATION: 100 % | DIASTOLIC BLOOD PRESSURE: 74 MMHG | TEMPERATURE: 98 F | RESPIRATION RATE: 18 BRPM | WEIGHT: 203 LBS | SYSTOLIC BLOOD PRESSURE: 128 MMHG | HEIGHT: 60 IN | BODY MASS INDEX: 39.85 KG/M2

## 2024-10-15 DIAGNOSIS — T78.40XA ALLERGIC REACTION, INITIAL ENCOUNTER: Primary | ICD-10-CM

## 2024-10-15 PROCEDURE — 96372 THER/PROPH/DIAG INJ SC/IM: CPT | Performed by: NURSE PRACTITIONER

## 2024-10-15 PROCEDURE — 99284 EMERGENCY DEPT VISIT MOD MDM: CPT | Mod: 25

## 2024-10-15 PROCEDURE — 63600175 PHARM REV CODE 636 W HCPCS: Performed by: NURSE PRACTITIONER

## 2024-10-15 RX ORDER — DESONIDE 0.5 MG/G
CREAM TOPICAL 2 TIMES DAILY
Qty: 60 G | Refills: 1 | Status: SHIPPED | OUTPATIENT
Start: 2024-10-15

## 2024-10-15 RX ORDER — DEXAMETHASONE SODIUM PHOSPHATE 4 MG/ML
8 INJECTION, SOLUTION INTRA-ARTICULAR; INTRALESIONAL; INTRAMUSCULAR; INTRAVENOUS; SOFT TISSUE
Status: COMPLETED | OUTPATIENT
Start: 2024-10-15 | End: 2024-10-15

## 2024-10-15 RX ADMIN — DEXAMETHASONE SODIUM PHOSPHATE 8 MG: 4 INJECTION, SOLUTION INTRA-ARTICULAR; INTRALESIONAL; INTRAMUSCULAR; INTRAVENOUS; SOFT TISSUE at 05:10

## 2024-10-15 NOTE — ED PROVIDER NOTES
Encounter Date: 10/15/2024       History     Chief Complaint   Patient presents with    Allergic Reaction    Rash     Pt stated that for the past 4 days she has been experiencing what she suspects is an allergic reaction from unknown cause - reddened / itchy area to chest.      The history is provided by the patient.   Allergic Reaction  The primary symptoms are  rash and urticaria. The current episode started today. This is a new problem.   The rash began today. The rash appears on the torso and neck. The rash is associated with itching.   Significant symptoms also include itching.     Review of patient's allergies indicates:   Allergen Reactions    Amoxicillin Other (See Comments)     Weak, sick, jaw tightens    Penicillins      Weak, sick, jaw tightens     Past Medical History:   Diagnosis Date    BMI 32.0-32.9,adult 11/9/2022    Migraine headache 2000    Ongoing    Morbidly obese     Unknown start date; lost over 80 pounds between 4036-2489    Neuropathy 2019    Bilateral feet; ongoing    Ovarian cyst 2017    Bilaterally; ongoing    Pulmonary hypertension 12/01/2022    Ongoing    Severe tricuspid regurgitation 11/09/2022    Resolved 6/28/2023 per echo    Thyroid disease 2013    Ongoing (S/P left partial thyroidectomy)    Vaginal delivery 12/11/2012     Past Surgical History:   Procedure Laterality Date    INSERTION, WIRELESS SENSOR, FOR PULMONARY ARTERIAL PRESSURE MONITORING Right 8/18/2023    Procedure: Insertion, Wireless Sensor, For Pulmonary Arterial Pressure Monitoring;  Surgeon: Maged Rolon MD;  Location: [facility] CATH LAB;  Service: Cardiology;  Laterality: Right;    LEFT HEART CATHETERIZATION Left 12/01/2022    Procedure: Left heart cath;  Surgeon: Kee Magana MD;  Location: [facility] CATH;  Service: Cardiovascular;  Laterality: Left;    RIGHT HEART CATHETERIZATION Right 12/01/2022    Procedure: INSERTION, CATHETER, RIGHT HEART;  Surgeon: Kee Magana MD;  Location: [facility] CATH;  Service: Cardiovascular;   Laterality: Right;  + [place]    RIGHT HEART CATHETERIZATION Right 2023    Procedure: INSERTION, CATHETER, RIGHT HEART;  Surgeon: Danny Clark MD;  Location: [facility] CATH LAB;  Service: Cardiology;  Laterality: Right;  with nitric oxide study    RIGHT HEART CATHETERIZATION Right 2023    Procedure: INSERTION, CATHETER, RIGHT HEART;  Surgeon: Maged Rolon MD;  Location: [facility] CATH LAB;  Service: Cardiology;  Laterality: Right;    RIGHT HEART CATHETERIZATION Right 2023    Procedure: INSERTION, CATHETER, RIGHT HEART;  Surgeon: Maged Rolon MD;  Location: [facility] CATH LAB;  Service: Cardiology;  Laterality: Right;    ROBOT-ASSISTED LAPAROSCOPIC ABDOMINAL HYSTERECTOMY USING DA HARRY XI N/A 2024    Procedure: XI ROBOTIC HYSTERECTOMY;  Surgeon: Kris Melvin MD;  [facility];  Service: OB/GYN;  Laterality: N/A;    ROBOT-ASSISTED LAPAROSCOPIC OOPHORECTOMY Bilateral 2024    Procedure: ROBOTIC OOPHORECTOMY;  Surgeon: Kris Melvin MD;  [facility];  Service: OB/GYN;  Laterality: Bilateral;    SELECTIVE UNILATERAL PULMONARY ANGIOGRAPHY  2023    Procedure: Pumonary angiography - unilateral;  Surgeon: Maged Rolon MD;  Location: [facility] CATH LAB;  Service: Cardiology;;    THYROIDECTOMY, PARTIAL Left      Family History   Problem Relation Name Age of Onset    No Known Problems Mother      No Known Problems Father      Ovarian cancer Maternal Grandmother      Cancer Maternal Grandfather       Social History     Tobacco Use    Smoking status: Former     Current packs/day: 0.00     Average packs/day: 1 pack/day for 5.0 years (5.0 ttl pk-yrs)     Types: Cigarettes     Start date:      Quit date: 2015     Years since quittin.7    Smokeless tobacco: Never   Substance Use Topics    Alcohol use: Not Currently     Comment: Reports drinking wine, malt liquor, and daquiris in the past. Current alcohol use 1-2 drinks once every 3-4 months.    Drug use: Never      Review of Systems   Skin:  Positive for itching and rash.   All other systems reviewed and are negative.      Physical Exam     Initial Vitals [10/15/24 1724]   BP Pulse Resp Temp SpO2   128/74 73 18 98 °F (36.7 °C) 100 %      MAP       --         Physical Exam    Nursing note and vitals reviewed.  Constitutional: Vital signs are normal. She appears well-developed and well-nourished. She is cooperative.   HENT:   Head: Normocephalic and atraumatic.   Nose: Nose normal.   Eyes: Conjunctivae are normal.   Neck: Neck supple.   Normal range of motion.  Cardiovascular:  Normal rate and regular rhythm.           Musculoskeletal:      Cervical back: Normal range of motion and neck supple.     Neurological: She is alert and oriented to person, place, and time.   Skin: Rash noted. Rash is macular and urticarial.        Psychiatric: She has a normal mood and affect. Her behavior is normal. Judgment and thought content normal.         ED Course   Procedures  Labs Reviewed - No data to display       Imaging Results    None          Medications   dexAMETHasone injection 8 mg (8 mg Intramuscular Given 10/15/24 1742)     Medical Decision Making  Unknown reason for rash. Started today. Has had several times in past and usually gets a steroid shot. No fever or other issues     Differential Dx: Contact dermatitis, Allergic Reaction, Urticaria     Amount and/or Complexity of Data Reviewed  Discussion of management or test interpretation with external provider(s): Will give an injection of Decadron. Benadryl prn OTC. Keep a log of possible causes. Follow-up[ if needed     Risk  Prescription drug management.                                      Clinical Impression:  Final diagnoses:  [T78.40XA] Allergic reaction, initial encounter (Primary)          ED Disposition Condition    Discharge Stable          ED Prescriptions       Medication Sig Dispense Start Date End Date Auth. Provider    desonide (DESOWEN) 0.05 % cream Apply topically  2 (two) times daily. 60 g 10/15/2024 -- Milton Nguyễn NP          Follow-up Information       Follow up With Specialties Details Why Contact Info Additional Information    [place] - Emergency Department Emergency Medicine In 3 days As needed, If symptoms worsen [address] [place] [address] [phone] Floor [address]             Milton Nguyễn NP  10/15/24 1920

## 2024-10-16 ENCOUNTER — TELEPHONE (OUTPATIENT)
Dept: TRANSPLANT | Facility: CLINIC | Age: 34
End: 2024-10-16
Payer: COMMERCIAL

## 2024-10-18 RX ORDER — SPIRONOLACTONE 25 MG/1
25 TABLET ORAL
Qty: 90 TABLET | Refills: 3 | Status: SHIPPED | OUTPATIENT
Start: 2024-10-18

## 2024-10-21 ENCOUNTER — TELEPHONE (OUTPATIENT)
Dept: GYNECOLOGIC ONCOLOGY | Facility: CLINIC | Age: 34
End: 2024-10-21
Payer: COMMERCIAL

## 2024-10-21 ENCOUNTER — DOCUMENTATION ONLY (OUTPATIENT)
Dept: TRANSPLANT | Facility: CLINIC | Age: 34
End: 2024-10-21
Payer: COMMERCIAL

## 2024-10-21 NOTE — PROGRESS NOTES
Pt CardioMems transmission received and review.          10/21/2024    11:54 AM 10/21/2024    11:53 AM 10/15/2024     9:46 AM 10/9/2024    12:50 PM 10/9/2024    12:49 PM 10/9/2024    12:48 PM 10/9/2024    12:47 PM   CardioMEMS Transmission    Transmission Date 10/21/2024 10/18/2024 10/15/2024 10/9/2024 10/7/2024 10/4/2024 9/30/2024   Transmission Type Maintenance Maintenance Maintenance Maintenance Maintenance Maintenance Maintenance   PAS 43 48 62 51 52 49    ALEKSANDER 27 29 39 32 35 32    PAD  19 18 26 19 22 23    Medication Adjustments  No No No No No No No           Medications changed? No    Patient contacted? No    Will continue to monitor.

## 2024-10-22 ENCOUNTER — PATIENT MESSAGE (OUTPATIENT)
Dept: TRANSPLANT | Facility: CLINIC | Age: 34
End: 2024-10-22
Payer: COMMERCIAL

## 2024-10-24 ENCOUNTER — DOCUMENTATION ONLY (OUTPATIENT)
Dept: TRANSPLANT | Facility: CLINIC | Age: 34
End: 2024-10-24
Payer: COMMERCIAL

## 2024-10-24 NOTE — PROGRESS NOTES
Pt CardioMems transmission received and review.          10/21/2024    11:54 AM 10/21/2024    11:53 AM 10/15/2024     9:46 AM 10/9/2024    12:50 PM 10/9/2024    12:49 PM 10/9/2024    12:48 PM 10/9/2024    12:47 PM   CardioMEMS Transmission    Transmission Date 10/21/2024 10/18/2024 10/15/2024 10/9/2024 10/7/2024 10/4/2024 9/30/2024   Transmission Type Maintenance Maintenance Maintenance Maintenance Maintenance Maintenance Maintenance   PAS 43 48 62 51 52 49    ALEKSANDER 27 29 39 32 35 32    PAD  19 18 26 19 22 23    Medication Adjustments  No No No No No No No         Pressures are stable.    Medications changed? No    Patient contacted? No    Will continue to monitor.

## 2024-10-29 ENCOUNTER — PATIENT MESSAGE (OUTPATIENT)
Dept: TRANSPLANT | Facility: CLINIC | Age: 34
End: 2024-10-29
Payer: COMMERCIAL

## 2024-10-30 ENCOUNTER — PATIENT MESSAGE (OUTPATIENT)
Dept: TRANSPLANT | Facility: CLINIC | Age: 34
End: 2024-10-30
Payer: COMMERCIAL

## 2024-10-30 ENCOUNTER — OFFICE VISIT (OUTPATIENT)
Dept: OBSTETRICS AND GYNECOLOGY | Facility: CLINIC | Age: 34
End: 2024-10-30
Payer: COMMERCIAL

## 2024-10-30 VITALS
HEART RATE: 73 BPM | SYSTOLIC BLOOD PRESSURE: 128 MMHG | DIASTOLIC BLOOD PRESSURE: 68 MMHG | HEIGHT: 60 IN | BODY MASS INDEX: 39.66 KG/M2 | WEIGHT: 202 LBS

## 2024-10-30 DIAGNOSIS — N95.1 MENOPAUSAL SYMPTOMS: ICD-10-CM

## 2024-10-30 DIAGNOSIS — Z90.710 HISTORY OF HYSTERECTOMY: ICD-10-CM

## 2024-10-30 DIAGNOSIS — L23.1 ALLERGIC CONTACT DERMATITIS DUE TO ADHESIVES: ICD-10-CM

## 2024-10-30 DIAGNOSIS — I27.20 PULMONARY HYPERTENSION: Primary | ICD-10-CM

## 2024-10-30 DIAGNOSIS — Z79.890 HORMONE REPLACEMENT THERAPY (HRT): ICD-10-CM

## 2024-10-30 PROCEDURE — 99999 PR PBB SHADOW E&M-EST. PATIENT-LVL III: CPT | Mod: PBBFAC,,, | Performed by: OBSTETRICS & GYNECOLOGY

## 2024-10-30 PROCEDURE — 96372 THER/PROPH/DIAG INJ SC/IM: CPT | Mod: S$GLB,,, | Performed by: OBSTETRICS & GYNECOLOGY

## 2024-10-30 PROCEDURE — 99213 OFFICE O/P EST LOW 20 MIN: CPT | Mod: 25,S$GLB,, | Performed by: OBSTETRICS & GYNECOLOGY

## 2024-10-30 RX ORDER — ESTRADIOL VALERATE 20 MG/ML
20 INJECTION INTRAMUSCULAR
Status: COMPLETED | OUTPATIENT
Start: 2024-10-30 | End: 2024-10-30

## 2024-10-30 RX ADMIN — ESTRADIOL VALERATE 20 MG: 20 INJECTION INTRAMUSCULAR at 10:10

## 2024-11-01 ENCOUNTER — DOCUMENTATION ONLY (OUTPATIENT)
Dept: TRANSPLANT | Facility: CLINIC | Age: 34
End: 2024-11-01
Payer: COMMERCIAL

## 2024-11-05 ENCOUNTER — DOCUMENTATION ONLY (OUTPATIENT)
Dept: TRANSPLANT | Facility: CLINIC | Age: 34
End: 2024-11-05
Payer: COMMERCIAL

## 2024-11-05 NOTE — PROGRESS NOTES
Pt CardioMems transmission received and review.          11/5/2024     9:08 AM 11/5/2024     9:07 AM 11/1/2024     8:15 AM 11/1/2024     8:14 AM 10/24/2024    11:44 AM 10/21/2024    11:54 AM 10/21/2024    11:53 AM   CardioMEMS Transmission    Transmission Date 11/5/2024 11/3/2024 11/1/2024 11/25/2024 10/24/2024 10/21/2024 10/18/2024   Transmission Type Maintenance Maintenance Maintenance Maintenance Maintenance Maintenance Maintenance   PAS 43 42 50 50 45 43 48   ALEKSANDER 29 26 31 30 28 27 29   PAD  21 19 20 19 19 19 18   Medication Adjustments  No No No No No No No         Pressures are stable.    Medications changed? No    Patient contacted? No    Will continue to monitor.

## 2024-11-08 ENCOUNTER — DOCUMENTATION ONLY (OUTPATIENT)
Dept: TRANSPLANT | Facility: CLINIC | Age: 34
End: 2024-11-08
Payer: COMMERCIAL

## 2024-11-08 NOTE — PROGRESS NOTES
Pt CardioMems transmission received and review.          11/8/2024     2:29 PM 11/5/2024     9:08 AM 11/5/2024     9:07 AM 11/1/2024     8:15 AM 11/1/2024     8:14 AM 10/24/2024    11:44 AM 10/21/2024    11:54 AM   CardioMEMS Transmission    Transmission Date 11/8/2024 11/5/2024 11/3/2024 11/1/2024 11/25/2024 10/24/2024 10/21/2024   Transmission Type Maintenance Maintenance Maintenance Maintenance Maintenance Maintenance Maintenance   PAS 46 43 42 50 50 45 43   ALEKSANDER 28 29 26 31 30 28 27   PAD  19 21 19 20 19 19 19   Medication Adjustments  No No No No No No No             Medications changed? No    Patient contacted? No    Will continue to monitor.

## 2024-11-11 ENCOUNTER — DOCUMENTATION ONLY (OUTPATIENT)
Dept: TRANSPLANT | Facility: CLINIC | Age: 34
End: 2024-11-11
Payer: COMMERCIAL

## 2024-11-11 NOTE — PROGRESS NOTES
Pt CardioMems transmission received and review.          11/11/2024     9:38 AM 11/8/2024     2:29 PM 11/5/2024     9:08 AM 11/5/2024     9:07 AM 11/1/2024     8:15 AM 11/1/2024     8:14 AM 10/24/2024    11:44 AM   CardioMEMS Transmission    Transmission Date 11/11/2024 11/8/2024 11/5/2024 11/3/2024 11/1/2024 11/25/2024 10/24/2024   Transmission Type Maintenance Maintenance Maintenance Maintenance Maintenance Maintenance Maintenance   PAS 50 46 43 42 50 50 45   ALEKSANDER 32 28 29 26 31 30 28   PAD  21 19 21 19 20 19 19   Medication Adjustments  No No No No No No No         Pressures are stable.    Medications changed? No    Patient contacted? No    Will continue to monitor.

## 2024-11-12 ENCOUNTER — PATIENT MESSAGE (OUTPATIENT)
Dept: TRANSPLANT | Facility: CLINIC | Age: 34
End: 2024-11-12
Payer: COMMERCIAL

## 2024-11-18 ENCOUNTER — TELEPHONE (OUTPATIENT)
Dept: TRANSPLANT | Facility: CLINIC | Age: 34
End: 2024-11-18
Payer: COMMERCIAL

## 2024-11-18 NOTE — TELEPHONE ENCOUNTER
NN reviewed patients labs with JENNIFER Nunn. Per JENNIFER Nunn patient is good to have next Winrevair shot on 11.19.2024. NN called patient to notify her that she can take her Winrevair shot tomorrow. Patient verbalized understanding. NN emailed [facility] pharmacy to let them know patient will be administering shot on 11.19.2024.

## 2024-11-19 ENCOUNTER — OFFICE VISIT (OUTPATIENT)
Dept: TRANSPLANT | Facility: CLINIC | Age: 34
End: 2024-11-19
Payer: COMMERCIAL

## 2024-11-19 ENCOUNTER — PATIENT MESSAGE (OUTPATIENT)
Dept: TRANSPLANT | Facility: CLINIC | Age: 34
End: 2024-11-19

## 2024-11-19 DIAGNOSIS — I50.82 ACUTE ON CHRONIC CONGESTIVE HEART FAILURE WITH RIGHT VENTRICULAR DIASTOLIC DYSFUNCTION: ICD-10-CM

## 2024-11-19 DIAGNOSIS — Z79.899 POLYPHARMACY: Primary | ICD-10-CM

## 2024-11-19 DIAGNOSIS — I50.33 ACUTE ON CHRONIC CONGESTIVE HEART FAILURE WITH RIGHT VENTRICULAR DIASTOLIC DYSFUNCTION: ICD-10-CM

## 2024-11-19 DIAGNOSIS — Z79.899 HIGH RISK MEDICATION USE: ICD-10-CM

## 2024-11-19 DIAGNOSIS — I27.20 PULMONARY HTN: Primary | ICD-10-CM

## 2024-11-19 DIAGNOSIS — Z98.890 S/P ROBOT-ASSISTED SURGICAL PROCEDURE: ICD-10-CM

## 2024-11-19 DIAGNOSIS — I27.21 WHO GROUP 1 PULMONARY ARTERIAL HYPERTENSION: Primary | ICD-10-CM

## 2024-11-19 PROCEDURE — 99214 OFFICE O/P EST MOD 30 MIN: CPT | Mod: 95,,,

## 2024-11-19 NOTE — PROGRESS NOTES
The patient location is: Home  The chief complaint leading to consultation is: Pulmonary Hypertension    Visit type: audiovisual    Face to Face time with patient: 25  40 minutes of total time spent on the encounter, which includes face to face time and non-face to face time preparing to see the patient (eg, review of tests), Obtaining and/or reviewing separately obtained history, Documenting clinical information in the electronic or other health record, Independently interpreting results (not separately reported) and communicating results to the patient/family/caregiver, or Care coordination (not separately reported).       Each patient to whom he or she provides medical services by telemedicine is:  (1) informed of the relationship between the physician and patient and the respective role of any other health care provider with respect to management of the patient; and (2) notified that he or she may decline to receive medical services by telemedicine and may withdraw from such care at any time.     Patient ID:  Mya Sarmiento is a 34 y.o. female who presents for follow-up of Pulmonary Hypertension.    HPI   Mrs. Sarmiento was referred by Dr. Magana who saw patient on 12/15/2022. Mrs. Sarmiento has a history of thyroid disease, obesity, and severe PAH by RHC performed on 12/1/2022. Has a smoking history - quit 7 years ago. Was diagnosed with ovarian cysts following c/o pain in L side. Had tests done pre-procedure with ECHO concerning for severe PAH, followed by L/RHC that was consistent with severe PAH and no CAD noted. Other tests include negative V/Q and normal CXR. CT and PFTs have been completed but need to obtain.  Denies hx of heart disease or clotting disorder, illicit drug use, diet pills, CTD, or autoimmune disorders. Denies concerning family history. She has a 10 year-old son (uneventful birth) and 19 year-old stepson.  At initial clinic visit, Mrs. Sarmiento reported being generally healthy. Reported NYHA FC I  symptoms. She had lost 80 lbs over the last year with diet and exercise. She exercises 3x a week for 30 minutes to an hour doing bike, swim, stepper, hula hoop.   Right heart cath in March showed severe PAH and she was directly admitted to hospital for initiation of SC remodulin therapy. She was discharged on SC Remodulin and Opsumit.  Mrs. Sarmiento has had a cardioMems device placed and completed the Cereno study. She is currently stable on Remodulin (40 ng/kg/min) and Opsumit 10 mg daily, per last visit with Dr. Rolon (12/2023).    11/19/20204: Since last visit, Mrs. Sarmiento has started on Winrevair for PAH. She is on shot #2 and denies SEs. Has not really felt a difference. Continues with SQ remodulin and opsynvi. She has worked up to 30-35 minutes, treadmill and stair stepper, 4-5 times a week. Mrs. Sarmiento states that her lasix might not be working as well because she is not urinating as much. She denies swelling, weight gain, or SOB. Mrs. Sarmietno is happy to not be in pain now that she has had a hysterectomy.          11/11/2024     9:38 AM 11/8/2024     2:29 PM 11/5/2024     9:08 AM 11/5/2024     9:07 AM 11/1/2024     8:15 AM 11/1/2024     8:14 AM 10/24/2024    11:44 AM   CardioMEMS Transmission    Transmission Date 11/11/2024 11/8/2024 11/5/2024 11/3/2024 11/1/2024 11/25/2024 10/24/2024   Transmission Type Maintenance Maintenance Maintenance Maintenance Maintenance Maintenance Maintenance   PAS 50 46 43 42 50 50 45   ALEKSANDER 32 28 29 26 31 30 28   PAD  21 19 21 19 20 19 19   Medication Adjustments  No No No No No No          9/23/2024     9:12 AM 9/19/2024    10:58 AM 9/16/2024     9:56 AM 9/13/2024     8:57 AM 9/9/2024     8:16 AM 9/5/2024     1:56 PM    CardioMEMS Transmission    Transmission Date 9/23/2024 9/19/2024 9/16/2024 9/10/2024 9/9/2024 9/5/2024    Transmission Type Maintenance Maintenance Maintenance Maintenance Maintenance Maintenance    PAS 52 50 51 47 51 60    ALEKSANDER 32 31 31 29 30 36    PAD  21 20 19 20 18 21     Medication Adjustments             6MWT:   6/18/2024 10/4/2024   6MW     6MWT Status completed without stopping  completed without stopping    Patient Reported No complaints  No complaints    Was O2 used? No  No    6MW Distance walked (feet) 1362 feet  1400 feet    Distance walked (meters) 415.14 meters  426.72 meters    Did patient stop? No  No    Oxygen Saturation 99 %  95 %    Supplemental Oxygen Room Air  Room Air    Heart Rate 83 bpm  93 bpm    Blood Pressure 119/75  116/83    Ayala Dyspnea Rating  nothing at all  nothing at all    Oxygen Saturation 98 %  98 %    Supplemental Oxygen Room Air  Room Air    Heart Rate 116 bpm  128 bpm    Blood Pressure 136/84  141/82    Ayala Dyspnea Rating  very light  very, very light (just noticeable)    Recovery Time (seconds) 73 seconds  98 seconds    Oxygen Saturation 99 %  99 %    Supplemental Oxygen Room Air  Room Air    Heart Rate 93 bpm  108 bpm      ECHO: 6/18/2024    Left Ventricle: The left ventricle is normal in size. Normal wall thickness. Normal wall motion. Septal flattening in systole consistent with right ventricular pressure overload. There is normal systolic function with a visually estimated ejection fraction of 55 - 60%. There is normal diastolic function.    Right Ventricle: Mild right ventricular enlargement. Systolic function is mildly reduced. Right ventricular global longitudinal strain is - 17.6%. Free wall strain 17.7%.    Aortic Valve: There is mild aortic regurgitation.    Mitral Valve: There is mild regurgitation.    Tricuspid Valve: There is mild to moderate regurgitation.    Pulmonary Artery: The estimated pulmonary artery systolic pressure is 66 mmHg.    IVC/SVC: Normal venous pressure at 3 mmHg.    ECHO: 2/1/2023  The left ventricle is normal in size with concentric remodeling and normal systolic function.  The estimated ejection fraction is 60%.  Normal left ventricular diastolic function.  There is abnormal septal wall motion.  Moderate right  ventricular enlargement with low normal right ventricular systolic function.  Moderate right atrial enlargement.  Mild aortic regurgitation.  Severe tricuspid regurgitation.  Intermediate central venous pressure (8 mmHg).  The estimated PA systolic pressure is 86 mmHg.  There is severe pulmonary hypertension.  Trivial pericardial effusion. Under the RA.    ECHO:           RHC: 12/12/2023  RA: 8 PA: 93/ 26/ 51 PWP: 13 .   Cardiac output was 5 by Tesfaye. Cardiac index is 2.9 L/min/m2.   Pulmonary vascular resistance: 8.     RHC: 2/1/2023  RA: 27/ 27/ 24 RV: 94/ 8/ 24 PA: 94/ 40/ 58 PWP: 10/ 27/ 12 .   Cardiac output was 4.8  by Tesfaye. Cardiac index is 2.6 L/min/m2.   O2 Sat: PA 62%.   Pulmonary vascular resistance: 9.6 CRAWFORD.   Condition 2: Peter at 20 ppm.  PA 97/39 (62)  PCWP 15, 27 (14)    Condition 3 Peter 40 ppm.  PA 96/37 (61)  PCWP 15, 21 (14)    Condition 4: Peter 80 ppm.  PA 96/38 (61)  PCWP 15/20 (14)  CO 5.5 l/min CI 3 l/min/m2    L/RHC: 12/1/2022  Procedure:  Left heart catheterization right heart catheterization shunt run and assessment of pulmonary vasoreactivity  Pre Op Diagnosis:  Pulmonary arterial hypertension  Post Op Diagnosis:  Same  Indications:  Same  Physician: Dr. Kee Magana MD  Entry:  Left radial artery and left brachial vein  Findings:  Coronary angiography performed initially.    All coronary arteries are normal.    Normal origin of the left and right coronary ostium   Left main is widely patent  Lad and diagonal branches are widely patent   Circumflex and marginal branches are widely  Right coronary is a dominant vessel  Right coronary has a normal origin and is widely patent   The PDA and posterolateral branches are widely patent  There is no coronary fistula or coronary anomaly     LV function is normal on LV g  No gradient on pullback and no intraventricular gradient  No mitral regurgitation seen     Right heart catheterization was then performed and shunt run was performed.     Superior vena  cava 71%  Right atrium 68%  Inferior vena cava 67%  Right ventricle 67%  Right ventricular outflow tract 69%   Main pulmonary artery 62%   Right upper pulmonary artery 65%  Left ventricle 94%   Aorta/subclavian 92%       LV pressure 127/20  Aortic pressure is 127/93  Wedge pressure 20   PA pressure is 110/48  RV pressure 110/36   RA pressure 40  Cardiac output 4.3 average over 3 measurements  Cardiac index 2.3     PVR = 11.8 Woods units     BSA  2.0 m2     Patient was then examined after giving supplemental oxygen for 5 minutes and there was no significant change in her pulmonary pressures.    Patient was examined after IV adenosine given at sequential dosing in 50 mcg increments peaking at a 250 microgram/kilogram per minute dosing   There does not appear to be any significant change in the pulmonary pressures indicating lack of pulmonary vaso reactivity.    V/Q SCAN: 12/13/2022  FINDINGS:  No ventilatory defects.     No perfusion defects.     Impression:     Low probability for pulmonary embolism.    CXR: 12/13/2022  FINDINGS:  Frontal chest radiograph demonstrates clear lungs.  No pleural fluid.  Cardiomediastinal silhouette is unremarkable.  No osseous abnormality.     Impression:     No evidence of cardiopulmonary disease.    PFTs: 5/22/2023  No obstruction. Mild Restriction. Reduction in DLCO (24%).     CT CHEST: 11/3/2022       6/28/2023   Pulmonary Hypertension Review Flowsheet    Medication Type SQ Remodulin    Dose (ng/kg/min) 40.65 ng/kg/min    Vial Size 10 mg/ml    DW (kg)  82 kg    Amt of Med used (ml) 1.8 ml    Pump Rate 20 nl/hr      Review of Systems   Constitutional: Negative.   HENT: Negative.     Eyes: Negative.    Cardiovascular: Negative.    Respiratory: Negative.     Endocrine: Negative.    Hematologic/Lymphatic: Negative.    Skin: Negative.    Musculoskeletal: Negative.    Gastrointestinal: Negative.    Genitourinary: Negative.    Neurological: Negative.    Psychiatric/Behavioral: Negative.      Allergic/Immunologic: Negative.         Objective:    Physical Exam Not done 2/2 virtual visit.  Vital Signs not available.      Lab Results   Component Value Date    BNP 48 10/04/2024     10/04/2024    K 4.3 10/04/2024    MG 2.2 10/04/2024     10/04/2024    CO2 30 (H) 10/04/2024    BUN 17 10/04/2024    CREATININE 0.8 10/04/2024    GLU 95 10/04/2024    AST 37 10/04/2024    ALT 45 (H) 10/04/2024    ALBUMIN 4.4 10/04/2024    PROT 8.6 (H) 10/04/2024    BILITOT 0.7 10/04/2024       Magnesium   Date Value Ref Range Status   10/04/2024 2.2 1.6 - 2.6 mg/dL Final       Lab Results   Component Value Date    WBC 8.17 11/15/2024    HGB 15.3 11/15/2024    HCT 46.0 11/15/2024    MCV 79 (L) 11/15/2024     11/15/2024       BNP   Date Value Ref Range Status   10/04/2024 48 0 - 99 pg/mL Final     Comment:     Values of less than 100 pg/ml are consistent with non-CHF populations.   07/12/2024 188 (H) 0 - 99 pg/mL Final     Comment:     Values of less than 100 pg/ml are consistent with non-CHF populations.   06/17/2024 50 0 - 99 pg/mL Final     Comment:     Values of less than 100 pg/ml are consistent with non-CHF populations.     ..  WHO Group: 1  Functional Class: I  REVEAL Score: 3 (Low Risk)  Assessment/Plan:     WHO group 1 pulmonary arterial hypertension  - Current medication: SQ Remodulin - 40.65 ng/kg.min; Opsynvi 10-40 mg; Winrevair (shot #2). No PH medication changes.  - Mrs. Sarmiento is continues to feel very well. Denies SEs or medication issues. Able to exercise.   - REVEAL SCORE is LOW RISK  - Reviewed CMEMS. Stable, no change.  - Decrease lasix 40 mg, daily to every other day. May take an extra lasix, PRN. Will obtain CMP, BNP with next CBC and discuss how she is doing with the diuretic change.  - Will plan for RHC.    -     Case Request-Cath Lab: INSERTION, CATHETER, RIGHT HEART  -     Comprehensive Metabolic Panel; Future; Expected date: 11/19/2024  -     CBC Auto Differential; Future; Expected date:  11/19/2024    Acute on chronic congestive heart failure with right ventricular diastolic dysfunction  - Most recent ECHO shows normal EF  55-60%. Mild RV enlargment. PASP 66.    High risk medication use - SC Remodulin  - SQ remodulin delivered through Remunity device. No issues.     S/p Robotic TLH, BSO, cystectomy  - Follows with local Gyn. Currently receiving an IM estrogen shot, through the office, each month.    Instructions  Decrease lasix 40 mg, daily to every other day.  Will obtain CMP, BNP with next CBC ([facility])  Follow up in 3 months with RHC, labs, walk.      Jesusita Carvalho DNP, APRN  [facility]

## 2024-11-21 ENCOUNTER — DOCUMENTATION ONLY (OUTPATIENT)
Dept: TRANSPLANT | Facility: CLINIC | Age: 34
End: 2024-11-21
Payer: COMMERCIAL

## 2024-11-21 NOTE — PROGRESS NOTES
Pt CardioMems transmission received and review.          11/21/2024     9:18 AM 11/21/2024     9:17 AM 11/11/2024     9:38 AM 11/8/2024     2:29 PM 11/5/2024     9:08 AM 11/5/2024     9:07 AM 11/1/2024     8:15 AM   CardioMEMS Transmission    Transmission Date  11/12/2024 11/11/2024 11/8/2024 11/5/2024 11/3/2024 11/1/2024   Transmission Type Maintenance Maintenance Maintenance Maintenance Maintenance Maintenance Maintenance   PAS 39 43 50 46 43 42 50   ALEKSANDER 32 30 32 28 29 26 31   PAD  27 24 21 19 21 19 20   Medication Adjustments  No No No No No No No           Medications changed? No    Patient contacted? No    Will continue to monitor.

## 2024-11-22 NOTE — Clinical Note
"Preventive Care Visit  [facility]  ELLY Boston [facility], Family Medicine  Nov 22, 2024    Assessment & Plan     Routine general medical examination at a health care facility  - Health maintenance and lifestyle reviewed. Updated medical and family history.  - Follow up in one year or sooner as needed.   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 TB Gold ordered.   - Quantiferon TB Gold Plus    Patient has been advised of split billing requirements and indicates understanding: Yes    BMI  Estimated body mass index is 25.44 kg/m  as calculated from the following:    Height as of this encounter: 1.676 m (5' 6\").    Weight as of this encounter: 71.5 kg (157 lb 9.6 oz).   Weight management plan: Discussed healthy diet and exercise guidelines    Counseling  Appropriate preventive services were addressed with this patient via screening, questionnaire, or discussion as appropriate for fall prevention, nutrition, physical activity, Tobacco-use cessation, social engagement, weight loss and cognition.  Checklist reviewing preventive services available has been given to the patient.  Reviewed patient's diet, addressing concerns and/or questions.   The patient was instructed to see the dentist every 6 months.     Due for annual physical in one year, otherwise follow up on as-needed basis.    Joan Flores is a 24 year old, presenting for the following:  Physical        11/22/2024     2:18 PM   Additional Questions   Roomed by Babak MENDEZ  Presents for annual physical and to update vaccines. No concerns. 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     Health Care " A percutaneous stick to the right internal jugular vein was performed. Ultrasound guidance was used to obtain access. Directive  Patient does not have a Health Care Directive: Information in AVS.       11/22/2024   General Health   How would you rate your overall physical health? Good   Feel stress (tense, anxious, or unable to sleep) Only a little      (!) STRESS CONCERN      11/22/2024   Nutrition   Three or more servings of calcium each day? (!) NO   Diet: Regular (no restrictions)   How many servings of fruit and vegetables per day? (!) 0-1   How many sweetened beverages each day? 0-1         11/22/2024   Exercise   Days per week of moderate/strenous exercise 6 days          11/22/2024   Social Factors   Frequency of gathering with friends or relatives Once a week   Worry food won't last until get money to buy more No   Food not last or not have enough money for food? No   Do you have housing? (Housing is defined as stable permanent housing and does not include staying ouside in a car, in a tent, in an abandoned building, in an overnight shelter, or couch-surfing.) No   Are you worried about losing your housing? No   Lack of transportation? No   Unable to get utilities (heat,electricity)? No   Want help with housing or utility concern? No      (!) HOUSING CONCERN PRESENT      11/22/2024   Dental   Dentist two times every year? (!) NO          11/22/2024   TB Screening   Were you born outside of the [place]? No      Today's PHQ-2 Score:       11/22/2024     2:09 PM   PHQ-2 ( 1999 Pfizer)   Q1: Little interest or pleasure in doing things 0    Q2: Feeling down, depressed or hopeless 0    PHQ-2 Score 0    Q1: Little interest or pleasure in doing things Not at all   Q2: Feeling down, depressed or hopeless Not at all   PHQ-2 Score 0       Patient-reported         11/22/2024   Substance Use   Alcohol more than 3/day or more than 7/wk Not Applicable   Do you use any other substances recreationally? No      Social History     Tobacco Use    Smoking status: Never    Smokeless tobacco: Never    Tobacco comments:     no secondhand smoke  "  Substance Use Topics    Alcohol use: No    Drug use: No         11/22/2024   One time HIV Screening   Previous HIV test? No          11/22/2024   STI Screening   New sexual partner(s) since last STI/HIV test? No          11/22/2024   Contraception/Family Planning   Questions about contraception or family planning No     Reviewed and updated as needed this visit by Provider   Tobacco   Meds  Problems  Med Hx  Surg Hx  Fam Hx  Soc Hx Sexual   Activity        Review of Systems  Constitutional, HEENT, cardiovascular, pulmonary, gi and gu systems are negative, except as otherwise noted.     Objective    Exam  BP (!) 132/93 (BP Location: Left arm, Patient Position: Sitting, Cuff Size: Adult Regular)   Pulse 75   Temp 97.7  F (36.5  C) (Temporal)   Resp 18   Ht 1.676 m (5' 6\")   Wt 71.5 kg (157 lb 9.6 oz)   SpO2 99%   BMI 25.44 kg/m     Estimated body mass index is 25.44 kg/m  as calculated from the following:    Height as of this encounter: 1.676 m (5' 6\").    Weight as of this encounter: 71.5 kg (157 lb 9.6 oz).    Physical Exam  GENERAL: alert and no distress  EYES: Eyes grossly normal to inspection, PERRL and conjunctivae and sclerae normal  HENT: ear canals and TM's normal, nose and mouth without ulcers or lesions  NECK: no adenopathy, no asymmetry, masses, or scars  RESP: lungs clear to auscultation - no rales, rhonchi or wheezes  CV: regular rate and rhythm, normal S1 S2, no S3 or S4, no murmur, click or rub, no peripheral edema  ABDOMEN: soft, nontender, no hepatosplenomegaly, no masses and bowel sounds normal  MS: no gross musculoskeletal defects noted, no edema  SKIN: no suspicious lesions or rashes  NEURO: Normal strength and tone, mentation intact and speech normal  PSYCH: mentation appears normal, affect normal/bright    Signed Electronically by: ELLY Boston CNP    "

## 2024-11-26 ENCOUNTER — DOCUMENTATION ONLY (OUTPATIENT)
Dept: TRANSPLANT | Facility: CLINIC | Age: 34
End: 2024-11-26
Payer: COMMERCIAL

## 2024-11-26 ENCOUNTER — PATIENT MESSAGE (OUTPATIENT)
Dept: TRANSPLANT | Facility: CLINIC | Age: 34
End: 2024-11-26
Payer: COMMERCIAL

## 2024-11-26 ENCOUNTER — TELEPHONE (OUTPATIENT)
Dept: TRANSPLANT | Facility: CLINIC | Age: 34
End: 2024-11-26
Payer: COMMERCIAL

## 2024-11-26 NOTE — TELEPHONE ENCOUNTER
Nurse spoke with the patient and informed her that several error message was received from her cardioMEMS device. Patient advised to call [employer] support. The number is listed on the back of the machine. Patient verbalized understanding and will give [employer] a call.

## 2024-11-26 NOTE — PROGRESS NOTES
Pt CardioMems transmission received and review.          11/26/2024    11:43 AM 11/21/2024     9:18 AM 11/21/2024     9:17 AM 11/11/2024     9:38 AM 11/8/2024     2:29 PM 11/5/2024     9:08 AM 11/5/2024     9:07 AM   CardioMEMS Transmission    Transmission Date 11/26/2024 11/20/2024 11/12/2024 11/11/2024 11/8/2024 11/5/2024 11/3/2024   Transmission Type Maintenance Maintenance Maintenance Maintenance Maintenance Maintenance Maintenance   PAS 39 39 43 50 46 43 42   ALEKSANDER 26 32 30 32 28 29 26   PAD  21 27 24 21 19 21 19   Medication Adjustments  No No No No No No No           Medications changed? No    Patient contacted? Yes, [employer] message received. Patient reached out to [employer] and issue with transmitting cardioMEMs resolved. Will continue to monitor.     Will continue to monitor.

## 2024-12-02 ENCOUNTER — DOCUMENTATION ONLY (OUTPATIENT)
Dept: TRANSPLANT | Facility: CLINIC | Age: 34
End: 2024-12-02
Payer: COMMERCIAL

## 2024-12-02 NOTE — PROGRESS NOTES
Pt CardioMems transmission received and review.          12/2/2024     9:22 AM 12/2/2024     9:21 AM 11/26/2024    11:43 AM 11/21/2024     9:18 AM 11/21/2024     9:17 AM 11/11/2024     9:38 AM 11/8/2024     2:29 PM   CardioMEMS Transmission    Transmission Date 12/2/2024 11/29/2024 11/26/2024 11/20/2024 11/12/2024 11/11/2024 11/8/2024   Transmission Type Maintenance Maintenance Maintenance Maintenance Maintenance Maintenance Maintenance   PAS 33 41 39 39 43 50 46   ALEKSANDER 23 26 26 32 30 32 28   PAD  17 17 21 27 24 21 19   Medication Adjustments  No No No No No No No         Pressures are stable.    Medications changed? No    Patient contacted? No    Will continue to monitor.

## 2024-12-04 ENCOUNTER — PATIENT MESSAGE (OUTPATIENT)
Dept: TRANSPLANT | Facility: CLINIC | Age: 34
End: 2024-12-04
Payer: COMMERCIAL

## 2024-12-04 ENCOUNTER — LAB VISIT (OUTPATIENT)
Dept: LAB | Facility: HOSPITAL | Age: 34
End: 2024-12-04
Attending: INTERNAL MEDICINE
Payer: COMMERCIAL

## 2024-12-04 ENCOUNTER — CLINICAL SUPPORT (OUTPATIENT)
Dept: OBSTETRICS AND GYNECOLOGY | Facility: CLINIC | Age: 34
End: 2024-12-04
Payer: COMMERCIAL

## 2024-12-04 DIAGNOSIS — R06.82 TACHYPNEA: ICD-10-CM

## 2024-12-04 DIAGNOSIS — Z79.890 HORMONE REPLACEMENT THERAPY (HRT): Primary | ICD-10-CM

## 2024-12-04 DIAGNOSIS — Z79.899 POLYPHARMACY: ICD-10-CM

## 2024-12-04 LAB
ALBUMIN SERPL BCP-MCNC: 3.9 G/DL (ref 3.5–5.2)
ALP SERPL-CCNC: 217 U/L (ref 55–135)
ALT SERPL W/O P-5'-P-CCNC: 45 U/L (ref 10–44)
ANION GAP SERPL CALC-SCNC: 10 MMOL/L (ref 3–11)
AST SERPL-CCNC: 30 U/L (ref 10–40)
BASOPHILS # BLD AUTO: 0.05 K/UL (ref 0–0.2)
BASOPHILS NFR BLD: 0.6 % (ref 0–1.9)
BILIRUB SERPL-MCNC: 0.3 MG/DL (ref 0.1–1)
BNP SERPL-MCNC: 39 PG/ML (ref 0–99)
BUN SERPL-MCNC: 14 MG/DL (ref 6–20)
CALCIUM SERPL-MCNC: 9.1 MG/DL (ref 8.7–10.5)
CHLORIDE SERPL-SCNC: 104 MMOL/L (ref 95–110)
CO2 SERPL-SCNC: 31 MMOL/L (ref 23–29)
CREAT SERPL-MCNC: 1 MG/DL (ref 0.5–1.4)
DIFFERENTIAL METHOD BLD: ABNORMAL
EOSINOPHIL # BLD AUTO: 0.4 K/UL (ref 0–0.5)
EOSINOPHIL NFR BLD: 4.6 % (ref 0–8)
ERYTHROCYTE [DISTWIDTH] IN BLOOD BY AUTOMATED COUNT: 15.8 % (ref 11.5–14.5)
EST. GFR  (NO RACE VARIABLE): >60 ML/MIN/1.73 M^2
GLUCOSE SERPL-MCNC: 83 MG/DL (ref 70–110)
HCT VFR BLD AUTO: 50.7 % (ref 37–48.5)
HGB BLD-MCNC: 16.2 G/DL (ref 12–16)
IMM GRANULOCYTES # BLD AUTO: 0.04 K/UL (ref 0–0.04)
IMM GRANULOCYTES NFR BLD AUTO: 0.5 % (ref 0–0.5)
LYMPHOCYTES # BLD AUTO: 1.8 K/UL (ref 1–4.8)
LYMPHOCYTES NFR BLD: 22.6 % (ref 18–48)
MAGNESIUM SERPL-MCNC: 2 MG/DL (ref 1.6–2.6)
MCH RBC QN AUTO: 26 PG (ref 27–31)
MCHC RBC AUTO-ENTMCNC: 32 G/DL (ref 32–36)
MCV RBC AUTO: 81 FL (ref 82–98)
MONOCYTES # BLD AUTO: 0.6 K/UL (ref 0.3–1)
MONOCYTES NFR BLD: 7.4 % (ref 4–15)
NEUTROPHILS # BLD AUTO: 5.2 K/UL (ref 1.8–7.7)
NEUTROPHILS NFR BLD: 64.3 % (ref 38–73)
NRBC BLD-RTO: 0 /100 WBC
PLATELET # BLD AUTO: 349 K/UL (ref 150–450)
PMV BLD AUTO: 11 FL (ref 9.2–12.9)
POTASSIUM SERPL-SCNC: 5.3 MMOL/L (ref 3.5–5.1)
PROT SERPL-MCNC: 8.1 G/DL (ref 6–8.4)
RBC # BLD AUTO: 6.24 M/UL (ref 4–5.4)
SODIUM SERPL-SCNC: 145 MMOL/L (ref 136–145)
WBC # BLD AUTO: 8.1 K/UL (ref 3.9–12.7)

## 2024-12-04 PROCEDURE — 96372 THER/PROPH/DIAG INJ SC/IM: CPT | Mod: S$GLB,,, | Performed by: OBSTETRICS & GYNECOLOGY

## 2024-12-04 PROCEDURE — 83735 ASSAY OF MAGNESIUM: CPT | Performed by: INTERNAL MEDICINE

## 2024-12-04 PROCEDURE — 85025 COMPLETE CBC W/AUTO DIFF WBC: CPT | Performed by: INTERNAL MEDICINE

## 2024-12-04 PROCEDURE — 36415 COLL VENOUS BLD VENIPUNCTURE: CPT | Performed by: INTERNAL MEDICINE

## 2024-12-04 PROCEDURE — 80053 COMPREHEN METABOLIC PANEL: CPT | Performed by: INTERNAL MEDICINE

## 2024-12-04 PROCEDURE — 83880 ASSAY OF NATRIURETIC PEPTIDE: CPT | Performed by: INTERNAL MEDICINE

## 2024-12-04 RX ORDER — ESTRADIOL VALERATE 20 MG/ML
20 INJECTION INTRAMUSCULAR
Status: COMPLETED | OUTPATIENT
Start: 2024-12-04 | End: 2024-12-04

## 2024-12-04 RX ADMIN — ESTRADIOL VALERATE 20 MG: 20 INJECTION INTRAMUSCULAR at 10:12

## 2024-12-04 NOTE — PROGRESS NOTES
Estradiol Valerate given in LUG. Pt tolerated well no c/o. Left clinic ambulatory and in stable condition

## 2024-12-05 ENCOUNTER — TELEPHONE (OUTPATIENT)
Dept: TRANSPLANT | Facility: CLINIC | Age: 34
End: 2024-12-05
Payer: COMMERCIAL

## 2024-12-05 DIAGNOSIS — R06.82 TACHYPNEA: Primary | ICD-10-CM

## 2024-12-05 NOTE — TELEPHONE ENCOUNTER
NN reviewed labs with JENNIFER Nunn. Per JENNIFER Nunn no new orders. NN called and informed the patient that her labs looked good and to let us know if she has other issues/concerns. Patient verbalized understanding.   DVT prophylaxis: pt is ambulatory, no pharm agent required  Diet: Regular  Dispo: Inpatient

## 2024-12-06 ENCOUNTER — DOCUMENTATION ONLY (OUTPATIENT)
Dept: TRANSPLANT | Facility: CLINIC | Age: 34
End: 2024-12-06
Payer: COMMERCIAL

## 2024-12-06 NOTE — PROGRESS NOTES
Pt CardioMems transmission received and review.          12/6/2024    10:41 AM 12/2/2024     9:22 AM 12/2/2024     9:21 AM 11/26/2024    11:43 AM 11/21/2024     9:18 AM 11/21/2024     9:17 AM 11/11/2024     9:38 AM   CardioMEMS Transmission    Transmission Date 12/6/2024 12/2/2024 11/29/2024 11/26/2024 11/20/2024 11/12/2024 11/11/2024   Transmission Type Maintenance Maintenance Maintenance Maintenance Maintenance Maintenance Maintenance   PAS 46 33 41 39 39 43 50   ALEKSANDER 27 23 26 26 32 30 32   PAD  18 17 17 21 27 24 21   Medication Adjustments  No No No No No No No         Pressures are stable.    Medications changed? No    Patient contacted? No    Will continue to monitor.

## 2024-12-10 ENCOUNTER — DOCUMENTATION ONLY (OUTPATIENT)
Dept: TRANSPLANT | Facility: CLINIC | Age: 34
End: 2024-12-10
Payer: COMMERCIAL

## 2024-12-10 NOTE — PROGRESS NOTES
Pt CardioMems transmission received and review.          12/10/2024     8:31 AM 12/6/2024    10:41 AM 12/2/2024     9:22 AM 12/2/2024     9:21 AM 11/26/2024    11:43 AM 11/21/2024     9:18 AM 11/21/2024     9:17 AM   CardioMEMS Transmission    Transmission Date 12/10/2024 12/6/2024 12/2/2024 11/29/2024 11/26/2024 11/20/2024 11/12/2024   Transmission Type Maintenance Maintenance Maintenance Maintenance Maintenance Maintenance Maintenance   PAS 33 46 33 41 39 39 43   ALEKSANDER 22 27 23 26 26 32 30   PAD  16 18 17 17 21 27 24   Medication Adjustments  No No No No No No No           Medications changed? No    Patient contacted? No    Will continue to monitor.

## 2024-12-12 ENCOUNTER — DOCUMENTATION ONLY (OUTPATIENT)
Dept: TRANSPLANT | Facility: CLINIC | Age: 34
End: 2024-12-12
Payer: COMMERCIAL

## 2024-12-12 NOTE — PROGRESS NOTES
Pt CardioMems transmission received and review.          12/12/2024     8:52 AM 12/10/2024     8:31 AM 12/6/2024    10:41 AM 12/2/2024     9:22 AM 12/2/2024     9:21 AM 11/26/2024    11:43 AM 11/21/2024     9:18 AM   CardioMEMS Transmission    Transmission Date 12/12/2024 12/10/2024 12/6/2024 12/2/2024 11/29/2024 11/26/2024 11/20/2024   Transmission Type Maintenance Maintenance Maintenance Maintenance Maintenance Maintenance Maintenance   PAS 33 33 46 33 41 39 39   ALEKSANDER 20 22 27 23 26 26 32   PAD  13 16 18 17 17 21 27   Medication Adjustments  No No No No No No No         Pressures are stable.    Medications changed? No    Patient contacted? No    Will continue to monitor.

## 2024-12-13 ENCOUNTER — PATIENT MESSAGE (OUTPATIENT)
Dept: TRANSPLANT | Facility: CLINIC | Age: 34
End: 2024-12-13
Payer: COMMERCIAL

## 2024-12-16 ENCOUNTER — DOCUMENTATION ONLY (OUTPATIENT)
Dept: TRANSPLANT | Facility: CLINIC | Age: 34
End: 2024-12-16
Payer: COMMERCIAL

## 2024-12-16 NOTE — PROGRESS NOTES
Pt CardioMems transmission received and review.          12/16/2024    11:21 AM 12/12/2024     8:52 AM 12/10/2024     8:31 AM 12/6/2024    10:41 AM 12/2/2024     9:22 AM 12/2/2024     9:21 AM 11/26/2024    11:43 AM   CardioMEMS Transmission    Transmission Date 12/16/2024 12/12/2024 12/10/2024 12/6/2024 12/2/2024 11/29/2024 11/26/2024   Transmission Type Maintenance Maintenance Maintenance Maintenance Maintenance Maintenance Maintenance   PAS 36 33 33 46 33 41 39   ALEKSANDER 23 20 22 27 23 26 26   PAD  16 13 16 18 17 17 21   Medication Adjustments  No No No No No No No           Medications changed? No    Patient contacted? No    Will continue to monitor.

## 2024-12-23 ENCOUNTER — DOCUMENTATION ONLY (OUTPATIENT)
Dept: TRANSPLANT | Facility: CLINIC | Age: 34
End: 2024-12-23
Payer: COMMERCIAL

## 2024-12-23 NOTE — PROGRESS NOTES
Pt CardioMems transmission received and review.          12/23/2024    11:16 AM 12/23/2024    11:15 AM 12/16/2024    11:21 AM 12/12/2024     8:52 AM 12/10/2024     8:31 AM 12/6/2024    10:41 AM 12/2/2024     9:22 AM   CardioMEMS Transmission    Transmission Date 12/23/2024 12/20/2024 12/16/2024 12/12/2024 12/10/2024 12/6/2024 12/2/2024   Transmission Type Maintenance Maintenance Maintenance Maintenance Maintenance Maintenance Maintenance   PAS 29 36 36 33 33 46 33   ALEKSANDER 21 23 23 20 22 27 23   PAD  16 16 16 13 16 18 17   Medication Adjustments  No No No No No No No         Pressures are stable.    Medications changed? No    Patient contacted? No    Will continue to monitor.

## 2024-12-27 ENCOUNTER — TELEPHONE (OUTPATIENT)
Dept: TRANSPLANT | Facility: CLINIC | Age: 34
End: 2024-12-27
Payer: COMMERCIAL

## 2024-12-27 ENCOUNTER — LAB VISIT (OUTPATIENT)
Dept: LAB | Facility: HOSPITAL | Age: 34
End: 2024-12-27
Payer: COMMERCIAL

## 2024-12-27 DIAGNOSIS — Z79.899 POLYPHARMACY: ICD-10-CM

## 2024-12-27 LAB
ALBUMIN SERPL BCP-MCNC: 4 G/DL (ref 3.5–5.2)
ALP SERPL-CCNC: 203 U/L (ref 55–135)
ALT SERPL W/O P-5'-P-CCNC: 30 U/L (ref 10–44)
ANION GAP SERPL CALC-SCNC: 10 MMOL/L (ref 3–11)
AST SERPL-CCNC: 22 U/L (ref 10–40)
BASOPHILS # BLD AUTO: 0.06 K/UL (ref 0–0.2)
BASOPHILS NFR BLD: 0.8 % (ref 0–1.9)
BILIRUB SERPL-MCNC: 0.4 MG/DL (ref 0.1–1)
BUN SERPL-MCNC: 19 MG/DL (ref 6–20)
CALCIUM SERPL-MCNC: 9 MG/DL (ref 8.7–10.5)
CHLORIDE SERPL-SCNC: 103 MMOL/L (ref 95–110)
CO2 SERPL-SCNC: 30 MMOL/L (ref 23–29)
CREAT SERPL-MCNC: 0.9 MG/DL (ref 0.5–1.4)
DIFFERENTIAL METHOD BLD: ABNORMAL
EOSINOPHIL # BLD AUTO: 0.4 K/UL (ref 0–0.5)
EOSINOPHIL NFR BLD: 5.9 % (ref 0–8)
ERYTHROCYTE [DISTWIDTH] IN BLOOD BY AUTOMATED COUNT: 17.5 % (ref 11.5–14.5)
EST. GFR  (NO RACE VARIABLE): >60 ML/MIN/1.73 M^2
GLUCOSE SERPL-MCNC: 94 MG/DL (ref 70–110)
HCT VFR BLD AUTO: 49.3 % (ref 37–48.5)
HGB BLD-MCNC: 16.5 G/DL (ref 12–16)
IMM GRANULOCYTES # BLD AUTO: 0.04 K/UL (ref 0–0.04)
IMM GRANULOCYTES NFR BLD AUTO: 0.6 % (ref 0–0.5)
LYMPHOCYTES # BLD AUTO: 1.6 K/UL (ref 1–4.8)
LYMPHOCYTES NFR BLD: 22.5 % (ref 18–48)
MCH RBC QN AUTO: 26.5 PG (ref 27–31)
MCHC RBC AUTO-ENTMCNC: 33.5 G/DL (ref 32–36)
MCV RBC AUTO: 79 FL (ref 82–98)
MONOCYTES # BLD AUTO: 0.4 K/UL (ref 0.3–1)
MONOCYTES NFR BLD: 6.1 % (ref 4–15)
NEUTROPHILS # BLD AUTO: 4.6 K/UL (ref 1.8–7.7)
NEUTROPHILS NFR BLD: 64.1 % (ref 38–73)
NRBC BLD-RTO: 0 /100 WBC
PLATELET # BLD AUTO: 264 K/UL (ref 150–450)
PMV BLD AUTO: 10.9 FL (ref 9.2–12.9)
POTASSIUM SERPL-SCNC: 3.5 MMOL/L (ref 3.5–5.1)
PROT SERPL-MCNC: 8 G/DL (ref 6–8.4)
RBC # BLD AUTO: 6.23 M/UL (ref 4–5.4)
SODIUM SERPL-SCNC: 143 MMOL/L (ref 136–145)
WBC # BLD AUTO: 7.17 K/UL (ref 3.9–12.7)

## 2024-12-27 PROCEDURE — 85025 COMPLETE CBC W/AUTO DIFF WBC: CPT

## 2024-12-27 PROCEDURE — 80053 COMPREHEN METABOLIC PANEL: CPT

## 2024-12-27 PROCEDURE — 36415 COLL VENOUS BLD VENIPUNCTURE: CPT

## 2024-12-27 NOTE — TELEPHONE ENCOUNTER
NN discussed patient's lab results with Dr. Woodward. Per Dr. Woodward patient can take her next Winrevair shot. NN called patient to inform her that she is cleared to take her next Winrevair shot. NN also set up patient's next set of labs and shot date. NN placed remind me for both.

## 2024-12-30 ENCOUNTER — DOCUMENTATION ONLY (OUTPATIENT)
Dept: TRANSPLANT | Facility: CLINIC | Age: 34
End: 2024-12-30
Payer: COMMERCIAL

## 2024-12-30 NOTE — PROGRESS NOTES
Pt CardioMems transmission received and review.          12/30/2024    11:19 AM 12/30/2024    11:18 AM 12/23/2024    11:16 AM 12/23/2024    11:15 AM 12/16/2024    11:21 AM 12/12/2024     8:52 AM 12/10/2024     8:31 AM   CardioMEMS Transmission    Transmission Date 12/30/2024 12/27/2024 12/23/2024 12/20/2024 12/16/2024 12/12/2024 12/10/2024   Transmission Type Maintenance Maintenance Maintenance Maintenance Maintenance Maintenance Maintenance   PAS 34 30 29 36 36 33 33   ALEKSANDER 22 20 21 23 23 20 22   PAD  16 15 16 16 16 13 16   Medication Adjustments  No No No No No No No         Pressures are stable.    Medications changed? No    Patient contacted? No    Will continue to monitor.    Negative

## 2025-01-03 ENCOUNTER — PATIENT MESSAGE (OUTPATIENT)
Dept: TRANSPLANT | Facility: CLINIC | Age: 35
End: 2025-01-03
Payer: COMMERCIAL

## 2025-01-03 ENCOUNTER — DOCUMENTATION ONLY (OUTPATIENT)
Dept: TRANSPLANT | Facility: CLINIC | Age: 35
End: 2025-01-03
Payer: COMMERCIAL

## 2025-01-03 ENCOUNTER — CLINICAL SUPPORT (OUTPATIENT)
Dept: OBSTETRICS AND GYNECOLOGY | Facility: CLINIC | Age: 35
End: 2025-01-03
Payer: COMMERCIAL

## 2025-01-03 DIAGNOSIS — Z79.890 HORMONE REPLACEMENT THERAPY (HRT): Primary | ICD-10-CM

## 2025-01-03 RX ORDER — ESTRADIOL VALERATE 20 MG/ML
20 INJECTION INTRAMUSCULAR
Status: COMPLETED | OUTPATIENT
Start: 2025-01-03 | End: 2025-01-03

## 2025-01-03 RX ADMIN — ESTRADIOL VALERATE 20 MG: 20 INJECTION INTRAMUSCULAR at 08:01

## 2025-01-03 NOTE — PROGRESS NOTES
Pt CardioMems transmission received and review.          1/3/2025    11:09 AM 12/30/2024    11:19 AM 12/30/2024    11:18 AM 12/23/2024    11:16 AM 12/23/2024    11:15 AM 12/16/2024    11:21 AM 12/12/2024     8:52 AM   CardioMEMS Transmission    Transmission Date 1/3/2025 12/30/2024 12/27/2024 12/23/2024 12/20/2024 12/16/2024 12/12/2024   Transmission Type Maintenance Maintenance Maintenance Maintenance Maintenance Maintenance Maintenance   PAS 35 34 30 29 36 36 33   ALEKSANDER 21 22 20 21 23 23 20   PAD  12 16 15 16 16 16 13   Medication Adjustments  No No No No No No No       Medications changed? No    Patient contacted? No    Will continue to monitor.

## 2025-01-03 NOTE — PROGRESS NOTES
Estradiol valerate given in RUG. Pt tolerated well no c/o. Left clinic ambulatory and in stable condition

## 2025-01-06 ENCOUNTER — TELEPHONE (OUTPATIENT)
Dept: TRANSPLANT | Facility: CLINIC | Age: 35
End: 2025-01-06
Payer: COMMERCIAL

## 2025-01-06 ENCOUNTER — DOCUMENTATION ONLY (OUTPATIENT)
Dept: TRANSPLANT | Facility: CLINIC | Age: 35
End: 2025-01-06
Payer: COMMERCIAL

## 2025-01-06 NOTE — PROGRESS NOTES
Pt CardioMems transmission received and review.          1/6/2025     8:45 AM 1/3/2025    11:09 AM 12/30/2024    11:19 AM 12/30/2024    11:18 AM 12/23/2024    11:16 AM 12/23/2024    11:15 AM 12/16/2024    11:21 AM   CardioMEMS Transmission    Transmission Date 1/6/2025 1/3/2025 12/30/2024 12/27/2024 12/23/2024 12/20/2024 12/16/2024   Transmission Type Maintenance Maintenance Maintenance Maintenance Maintenance Maintenance Maintenance   PAS 34 35 34 30 29 36 36   ALEKSANDER 24 21 22 20 21 23 23   PAD  17 12 16 15 16 16 16   Medication Adjustments  No No No No No No No           Medications changed? No    Patient contacted? No    Will continue to monitor.

## 2025-01-06 NOTE — TELEPHONE ENCOUNTER
NN dicussed patient's questions about whether the patient missing three days of her Opsynvi medication would negatively impact her RHC this week with JENNIFER Nunn. Per JENNIFER Nunn that little amount of time off the medication should not impact the PHC results. Per JENNIFER Nunn patient should still come for her RHC. NN explained to the patient that her Opsynvi should be shipped out to her tomorrow 1/7/2025. NN also stated that the patient's Remodulin and Winrevair per her specialty pharmacy are not needing anything form the providers office at this time. Patient verbalized understanding of all of the above information.

## 2025-01-09 ENCOUNTER — DOCUMENTATION ONLY (OUTPATIENT)
Dept: TRANSPLANT | Facility: CLINIC | Age: 35
End: 2025-01-09
Payer: COMMERCIAL

## 2025-01-09 NOTE — PROGRESS NOTES
Pt CardioMems transmission received and review.          1/9/2025    10:23 AM 1/6/2025     8:45 AM 1/3/2025    11:09 AM 12/30/2024    11:19 AM 12/30/2024    11:18 AM 12/23/2024    11:16 AM 12/23/2024    11:15 AM   CardioMEMS Transmission    Transmission Date 1/9/2025 1/6/2025 1/3/2025 12/30/2024 12/27/2024 12/23/2024 12/20/2024   Transmission Type Maintenance Maintenance Maintenance Maintenance Maintenance Maintenance Maintenance   PAS 30 34 35 34 30 29 36   ALEKSANDER 19 24 21 22 20 21 23   PAD  13 17 12 16 15 16 16   Medication Adjustments  No No No No No No No         Pressures are stable.    Medications changed? No    Patient contacted? No    Will continue to monitor.    Complex Repair And Split-Thickness Skin Graft Text: The defect edges were debeveled with a #15 scalpel blade.  The primary defect was closed partially with a complex linear closure.  Given the location of the defect, shape of the defect and the proximity to free margins a split thickness skin graft was deemed most appropriate to repair the remaining defect.  The graft was trimmed to fit the size of the remaining defect.  The graft was then placed in the primary defect, oriented appropriately, and sutured into place.

## 2025-01-10 ENCOUNTER — HOSPITAL ENCOUNTER (OUTPATIENT)
Dept: PULMONOLOGY | Facility: CLINIC | Age: 35
Discharge: HOME | End: 2025-01-10
Payer: COMMERCIAL

## 2025-01-10 ENCOUNTER — HOSPITAL ENCOUNTER (OUTPATIENT)
Facility: HOSPITAL | Age: 35
Discharge: HOME | End: 2025-01-10
Attending: INTERNAL MEDICINE | Admitting: INTERNAL MEDICINE
Payer: COMMERCIAL

## 2025-01-10 VITALS
HEART RATE: 69 BPM | RESPIRATION RATE: 21 BRPM | DIASTOLIC BLOOD PRESSURE: 60 MMHG | SYSTOLIC BLOOD PRESSURE: 116 MMHG | OXYGEN SATURATION: 97 %

## 2025-01-10 VITALS — WEIGHT: 197 LBS | BODY MASS INDEX: 38.68 KG/M2 | HEIGHT: 60 IN

## 2025-01-10 DIAGNOSIS — I27.9 CHRONIC PULMONARY HEART DISEASE: ICD-10-CM

## 2025-01-10 DIAGNOSIS — I27.21 WHO GROUP 1 PULMONARY ARTERIAL HYPERTENSION: ICD-10-CM

## 2025-01-10 PROCEDURE — 93451 RIGHT HEART CATH: CPT | Performed by: INTERNAL MEDICINE

## 2025-01-10 PROCEDURE — C1751 CATH, INF, PER/CENT/MIDLINE: HCPCS | Performed by: INTERNAL MEDICINE

## 2025-01-10 PROCEDURE — 63600175 PHARM REV CODE 636 W HCPCS: Performed by: INTERNAL MEDICINE

## 2025-01-10 PROCEDURE — C1894 INTRO/SHEATH, NON-LASER: HCPCS | Performed by: INTERNAL MEDICINE

## 2025-01-10 PROCEDURE — 93451 RIGHT HEART CATH: CPT | Mod: 26,,, | Performed by: INTERNAL MEDICINE

## 2025-01-10 PROCEDURE — 94618 PULMONARY STRESS TESTING: CPT | Mod: S$GLB,,, | Performed by: INTERNAL MEDICINE

## 2025-01-10 RX ORDER — LIDOCAINE HYDROCHLORIDE 20 MG/ML
INJECTION, SOLUTION INFILTRATION; PERINEURAL
Status: DISCONTINUED | OUTPATIENT
Start: 2025-01-10 | End: 2025-01-10 | Stop reason: HOSPADM

## 2025-01-10 NOTE — DISCHARGE INSTRUCTIONS

## 2025-01-10 NOTE — Clinical Note
The catheter was repositioned into the pulmonary artery. Hemodynamics were performed.  The patient's O2 saturation measured 75%.  Thermodilutions performed

## 2025-01-10 NOTE — PLAN OF CARE
Report received from CLAUDY Key.  Patient  received to sscu room 7A at 1120am. Patient ambulated into room post procedure from wheelchair. VSS. Aaox4. Right neck with gauze/tegaderm dressing intact.  No complaints.  No bleeding/no hematoma noted.  discharge instructions reviewed.  Family notified and to  bedside. Patient ambulatory for discharge with spouse.

## 2025-01-10 NOTE — H&P
Subjective:      Mya Sarmiento is a 34 y.o. female with WHO group 1 PH who comes for a RHC.     Past Medical History:   Diagnosis Date    BMI 32.0-32.9,adult 11/9/2022    Migraine headache 2000    Ongoing    Morbidly obese     Unknown start date; lost over 80 pounds between 9339-7990    Neuropathy 2019    Bilateral feet; ongoing    Ovarian cyst 2017    Bilaterally; ongoing    Pulmonary hypertension 12/01/2022    Ongoing    Severe tricuspid regurgitation 11/09/2022    Resolved 6/28/2023 per echo    Thyroid disease 2013    Ongoing (S/P left partial thyroidectomy)    Vaginal delivery 12/11/2012     Past Surgical History:   Procedure Laterality Date    INSERTION, WIRELESS SENSOR, FOR PULMONARY ARTERIAL PRESSURE MONITORING Right 8/18/2023    Procedure: Insertion, Wireless Sensor, For Pulmonary Arterial Pressure Monitoring;  Surgeon: Maged Rolon MD;  Location: [facility] CATH LAB;  Service: Cardiology;  Laterality: Right;    LEFT HEART CATHETERIZATION Left 12/01/2022    Procedure: Left heart cath;  Surgeon: Kee Magana MD;  Location: [facility] CATH;  Service: Cardiovascular;  Laterality: Left;    RIGHT HEART CATHETERIZATION Right 12/01/2022    Procedure: INSERTION, CATHETER, RIGHT HEART;  Surgeon: Kee Magana MD;  Location: [facility] CATH;  Service: Cardiovascular;  Laterality: Right;  + [place]    RIGHT HEART CATHETERIZATION Right 02/01/2023    Procedure: INSERTION, CATHETER, RIGHT HEART;  Surgeon: Danny Clark MD;  Location: [facility] CATH LAB;  Service: Cardiology;  Laterality: Right;  with nitric oxide study    RIGHT HEART CATHETERIZATION Right 8/18/2023    Procedure: INSERTION, CATHETER, RIGHT HEART;  Surgeon: Maged Rolon MD;  Location: [facility] CATH LAB;  Service: Cardiology;  Laterality: Right;    RIGHT HEART CATHETERIZATION Right 12/12/2023    Procedure: INSERTION, CATHETER, RIGHT HEART;  Surgeon: Maged Rolon MD;  Location: [facility] CATH LAB;  Service: Cardiology;  Laterality: Right;    ROBOT-ASSISTED  LAPAROSCOPIC ABDOMINAL HYSTERECTOMY USING DA HARRY XI N/A 7/11/2024    Procedure: XI ROBOTIC HYSTERECTOMY;  Surgeon: Kris Melvin MD;  Location: [facility] OR [address] [place] [address];  Service: OB/GYN;  Laterality: N/A;    ROBOT-ASSISTED LAPAROSCOPIC OOPHORECTOMY Bilateral 7/11/2024    Procedure: ROBOTIC OOPHORECTOMY;  Surgeon: Kris Melvin MD;  [facility];  Service: OB/GYN;  Laterality: Bilateral;    SELECTIVE UNILATERAL PULMONARY ANGIOGRAPHY  8/18/2023    Procedure: Pumonary angiography - unilateral;  Surgeon: Maged Rolon MD;  Location: [facility] CATH LAB;  Service: Cardiology;;    THYROIDECTOMY, PARTIAL Left 2013         Other significant clinical information:  Review of patient's allergies indicates:   Allergen Reactions    Amoxicillin Other (See Comments)     Weak, sick, jaw tightens    Penicillins      Weak, sick, jaw tightens     No current facility-administered medications for this encounter.     Review of Systems   Constitutional: Positive for weight gain   HENT: Negative.     Eyes: Negative.    Cardiovascular: Negative.    Respiratory: Negative.     Endocrine: Negative.    Hematologic/Lymphatic: Negative.    Skin: Negative.    Musculoskeletal: Negative.    Gastrointestinal: Negative.    Genitourinary: Negative.    Neurological: Negative.    Psychiatric/Behavioral: Negative.     Allergic/Immunologic: Negative.       Physical Exam  Constitutional:       Appearance: Normal appearance.   HENT:      Head: Normocephalic.   Cardiovascular:      Rate and Rhythm: Normal rate and regular rhythm.      Pulses: Normal pulses.      Heart sounds: Normal heart sounds.   Pulmonary:      Effort: Pulmonary effort is normal.      Breath sounds: Normal breath sounds.   Abdominal:      Palpations: Abdomen is soft.   Musculoskeletal:         General: Normal range of motion.      Cervical back: Normal range of motion.   Skin:     General: Skin is warm and dry.      Capillary Refill: Capillary refill takes less than 2 seconds.    Neurological:      Mental Status: She is oriented to person, place, and time.   Psychiatric:         Mood and Affect: Mood normal.         Behavior: Behavior normal.      Assessment:   Plan for a RHC to assess her hemodynamics.   Access via the right IJ  7 Fr venous sheath  Risks and benefits of the procedure were explained to the patient. All questions were answered.  Consents were signed and in the chart.    Maged Rolon MD

## 2025-01-10 NOTE — PROCEDURES
Mya Sarmiento is a 34 y.o.   female patient, who presents for a 6 minute walk test ordered by MD Gonzales.  The diagnosis is Qualify for Oxygen.  The patient's BMI is 38.5 kg/m2.  Predicted distance (lower limit of normal) is 439.54 meters.      Test Results:    The test was completed without stopping.   The total time walked was 360 seconds.  During walking, the patient reported:  No complaints. The patient used    during testing.     01/10/2025---------Distance: 426.72 meters (1400 feet)     O2 Sat % Supplemental Oxygen Heart Rate Blood Pressure Ayala Scale   Pre-exercise  (Resting) 98 % Room Air 69 bpm 117/80 mmHg 0   During Exercise 92 % Room Air 121 bpm 144/83 mmHg 0.5   Post-exercise  (Recovery) 96 % Room Air  82 bpm 124/79 mmHg       Recovery Time: 101 seconds    Performing nurse/tech: Paige BINGHAM      PREVIOUS STUDY:   The patient had a previous study.  10/06/2024---------Distance: 426.72 meters (1400 feet)       O2 Sat % Supplemental Oxygen Heart Rate Blood Pressure Ayala Scale   Pre-exercise  (Resting) 95 % Room Air 93 bpm 116/83 mmHg 0   During Exercise 98 % Room Air 128 bpm 141/82 mmHg 0.5   Post-exercise  (Recovery) 99 % Room Air  108 bpm 129/82 mmHg         CLINICAL INTERPRETATION:  Six minute walk distance is 426.72 meters (1400 feet) with very, very light dyspnea.  During exercise, there was significant desaturation while breathing room air.  Both blood pressure and heart rate remained stable with walking.  The patient did not report non-pulmonary symptoms during exercise.  Since the previous study in 10/2024, exercise capacity is unchanged.  Based upon age and body mass index, exercise capacity is normal.

## 2025-01-10 NOTE — BRIEF OP NOTE
[facility] - [facility] Lab  Brief Operative Note  Cardiology    SUMMARY     Surgery Date: 1/10/2025     Surgeons and Role:     * Maged Rolon MD - Primary    Assisting Surgeon: None    Pre-op Diagnosis:  WHO group 1 pulmonary arterial hypertension [I27.21]    Post-op Diagnosis: Post-Op Diagnosis Codes:     * WHO group 1 pulmonary arterial hypertension [I27.21]    Procedure Performed: RHC and CardioMEMS recalibration.     Description of the findings of the procedure: RHC performed via the right IJ. Patient tolerated the procedure well. Normal left and right side filling pressures (RA= 4 mm of Hg, PCWP=8 mm of Hg). Moderate pulmonary HTN  (PA=59/23 mm of Hg, PA mean=36 mm of Hg, TPG=28, PVR=5.4). Normal cardiac index and output  (Tesfaye: CI=2.5 L/min/mn2, CO=4.7 L/min ; TD: CI=2.8 L/min/m2, CO=5.1 L/min)    Estimated Blood Loss: < 10 cc    Plan:   - Routine post-cath care.  - Moderate precapillary PH. When compared to RHC done last year, there has been a significant improvement in her PA pressures (improved from PA=93/26 mm of Hg with PA mean=51 mm of Hg and PVR of 8 to PA= 59/23 mm of Hg, PA mean=36 mm of Hg and PVR=5.4).  - CardioMEMS recalibration was performed in cathlab as well with ecellent correlation of PA mean and diastolic pressures.     Maged Rolon MD

## 2025-01-10 NOTE — DISCHARGE SUMMARY
[facility] - Cath Lab  Discharge Note  Short Stay    Procedure(s) (LRB):  INSERTION, CATHETER, RIGHT HEART (Right)      OUTCOME: Patient tolerated treatment/procedure well without complication and is now ready for discharge.       Medication List        ASK your doctor about these medications      aspirin 81 MG EC tablet  Commonly known as: ECOTRIN  TAKE 1 TABLET BY MOUTH EVERY DAY     furosemide 40 MG tablet  Commonly known as: LASIX  Take 1 tablet (40 mg total) by mouth once daily.     levothyroxine 175 MCG tablet  Commonly known as: SYNTHROID, LEVOTHROID     OPSYNVI 10-40 mg Tab  Generic drug: macitentan-tadalafil  Take 1 tablet by mouth once daily.     pregabalin 50 MG capsule  Commonly known as: LYRICA  TAKE 2 CAPSULES (100 MG TOTAL) BY MOUTH EVERY EVENING.     spironolactone 25 MG tablet  Commonly known as: ALDACTONE  TAKE 1 TABLET BY MOUTH EVERY DAY     treprostinil 2.5 mg/mL Soln  Commonly known as: REMODULIN     WINREVAIR SUBQ                DISPOSITION: Home or Self Care    FINAL DIAGNOSIS:  WHO group 1 PH    FOLLOWUP: In clinic    DISCHARGE INSTRUCTIONS:      Cardiac diet    Activity as tolerated    Maged Rolon MD

## 2025-01-13 ENCOUNTER — DOCUMENTATION ONLY (OUTPATIENT)
Dept: TRANSPLANT | Facility: CLINIC | Age: 35
End: 2025-01-13
Payer: COMMERCIAL

## 2025-01-13 NOTE — PROGRESS NOTES
Pt CardioMems transmission received and review.          1/13/2025    10:17 AM 1/9/2025    10:23 AM 1/6/2025     8:45 AM 1/3/2025    11:09 AM 12/30/2024    11:19 AM 12/30/2024    11:18 AM 12/23/2024    11:16 AM   CardioMEMS Transmission    Transmission Date 1/13/2025 1/9/2025 1/6/2025 1/3/2025 12/30/2024 12/27/2024 12/23/2024   Transmission Type Maintenance Maintenance Maintenance Maintenance Maintenance Maintenance Maintenance   PAS 40 30 34 35 34 30 29   ALEKSANDER 33 19 24 21 22 20 21   PAD  29 13 17 12 16 15 16   Medication Adjustments  No No No No No No No         Pressures are stable.    Medications changed? No    Patient contacted? No    Will continue to monitor.

## 2025-01-16 ENCOUNTER — LAB VISIT (OUTPATIENT)
Dept: LAB | Facility: HOSPITAL | Age: 35
End: 2025-01-16
Attending: INTERNAL MEDICINE
Payer: COMMERCIAL

## 2025-01-16 ENCOUNTER — TELEPHONE (OUTPATIENT)
Dept: TRANSPLANT | Facility: CLINIC | Age: 35
End: 2025-01-16
Payer: COMMERCIAL

## 2025-01-16 DIAGNOSIS — Z79.899 POLYPHARMACY: ICD-10-CM

## 2025-01-16 LAB
BASOPHILS # BLD AUTO: 0.05 K/UL (ref 0–0.2)
BASOPHILS NFR BLD: 0.6 % (ref 0–1.9)
DIFFERENTIAL METHOD BLD: ABNORMAL
EOSINOPHIL # BLD AUTO: 0.4 K/UL (ref 0–0.5)
EOSINOPHIL NFR BLD: 4.2 % (ref 0–8)
ERYTHROCYTE [DISTWIDTH] IN BLOOD BY AUTOMATED COUNT: 17.2 % (ref 11.5–14.5)
HCT VFR BLD AUTO: 50.3 % (ref 37–48.5)
HGB BLD-MCNC: 16.4 G/DL (ref 12–16)
IMM GRANULOCYTES # BLD AUTO: 0.04 K/UL (ref 0–0.04)
IMM GRANULOCYTES NFR BLD AUTO: 0.4 % (ref 0–0.5)
LYMPHOCYTES # BLD AUTO: 1.4 K/UL (ref 1–4.8)
LYMPHOCYTES NFR BLD: 15.8 % (ref 18–48)
MCH RBC QN AUTO: 26.3 PG (ref 27–31)
MCHC RBC AUTO-ENTMCNC: 32.6 G/DL (ref 32–36)
MCV RBC AUTO: 81 FL (ref 82–98)
MONOCYTES # BLD AUTO: 0.5 K/UL (ref 0.3–1)
MONOCYTES NFR BLD: 5.7 % (ref 4–15)
NEUTROPHILS # BLD AUTO: 6.6 K/UL (ref 1.8–7.7)
NEUTROPHILS NFR BLD: 73.3 % (ref 38–73)
NRBC BLD-RTO: 0 /100 WBC
PLATELET # BLD AUTO: 284 K/UL (ref 150–450)
PMV BLD AUTO: 10.4 FL (ref 9.2–12.9)
RBC # BLD AUTO: 6.23 M/UL (ref 4–5.4)
WBC # BLD AUTO: 8.95 K/UL (ref 3.9–12.7)

## 2025-01-16 PROCEDURE — 36415 COLL VENOUS BLD VENIPUNCTURE: CPT | Performed by: INTERNAL MEDICINE

## 2025-01-16 PROCEDURE — 85025 COMPLETE CBC W/AUTO DIFF WBC: CPT | Performed by: INTERNAL MEDICINE

## 2025-01-16 NOTE — TELEPHONE ENCOUNTER
NN discussed patient's labs results with JENNIFER Nunn. Per JENNIFER Nunn patient can take her next Winrevair shot. NN called patient and congradulated the patient on her RHC results. Patient stated that she knew from Dr. Rolon that the RHC was good but did not know the specifics. NN gave the patient her last RHC PA and PVR numbers and then told her the Latest RHC PA and PVR numbers. Patient was very happy to hear those results with reduced pressures. NN then explained that the patient can take her Winrevair shot on 1/21. Patient then asked with all of this good news if NN could ask the providers if she could possibly titrate down her Remodulin so she can transition off. But patient states she understands if not. NN stated that the providers will have a meeting next week to discuss patient and NN will ask the MD then. NN then scheduled patient's next Winrevair shot and labs with the patient.

## 2025-01-17 ENCOUNTER — DOCUMENTATION ONLY (OUTPATIENT)
Dept: TRANSPLANT | Facility: CLINIC | Age: 35
End: 2025-01-17
Payer: COMMERCIAL

## 2025-01-17 NOTE — PROGRESS NOTES
Pt CardioMems transmission received and review.          1/17/2025     9:46 AM 1/13/2025    10:17 AM 1/9/2025    10:23 AM 1/6/2025     8:45 AM 1/3/2025    11:09 AM 12/30/2024    11:19 AM 12/30/2024    11:18 AM   CardioMEMS Transmission    Transmission Date 1/17/2025 1/13/2025 1/9/2025 1/6/2025 1/3/2025 12/30/2024 12/27/2024   Transmission Type Maintenance Maintenance Maintenance Maintenance Maintenance Maintenance Maintenance   PAS 47 40 30 34 35 34 30   ALEKSANDER 36 33 19 24 21 22 20   PAD  30 29 13 17 12 16 15   Medication Adjustments  No No No No No No No           Medications changed? No    Patient contacted? No    Will continue to monitor.

## 2025-01-18 DIAGNOSIS — I27.21 WHO GROUP 1 PULMONARY ARTERIAL HYPERTENSION: Primary | ICD-10-CM

## 2025-01-22 ENCOUNTER — PATIENT MESSAGE (OUTPATIENT)
Dept: OBSTETRICS AND GYNECOLOGY | Facility: CLINIC | Age: 35
End: 2025-01-22
Payer: COMMERCIAL

## 2025-01-22 DIAGNOSIS — I27.21 WHO GROUP 1 PULMONARY ARTERIAL HYPERTENSION: Primary | ICD-10-CM

## 2025-01-24 ENCOUNTER — DOCUMENTATION ONLY (OUTPATIENT)
Dept: TRANSPLANT | Facility: CLINIC | Age: 35
End: 2025-01-24
Payer: COMMERCIAL

## 2025-01-24 ENCOUNTER — PATIENT MESSAGE (OUTPATIENT)
Dept: TRANSPLANT | Facility: CLINIC | Age: 35
End: 2025-01-24
Payer: COMMERCIAL

## 2025-01-24 RX ORDER — LEVOTHYROXINE SODIUM 175 UG/1
175 TABLET ORAL EVERY MORNING
Qty: 90 TABLET | Refills: 0 | Status: SHIPPED | OUTPATIENT
Start: 2025-01-24 | End: 2026-01-24

## 2025-01-24 NOTE — PROGRESS NOTES
Pt CardioMems transmission received and review.          1/24/2025    10:45 AM 1/24/2025    10:44 AM 1/17/2025     9:46 AM 1/13/2025    10:17 AM 1/9/2025    10:23 AM 1/6/2025     8:45 AM 1/3/2025    11:09 AM   CardioMEMS Transmission    Transmission Date 1/24/2025 1/20/2025 1/17/2025 1/13/2025 1/9/2025 1/6/2025 1/3/2025   Transmission Type Maintenance Maintenance Maintenance Maintenance Maintenance Maintenance Maintenance   PAS 43 46 47 40 30 34 35   ALEKSANDER 34 33 36 33 19 24 21   PAD  30 28 30 29 13 17 12   Medication Adjustments  No No No No No No No         Pressures are stable.    Medications changed? No    Patient contacted? No    Will continue to monitor.

## 2025-01-25 RX ORDER — FUROSEMIDE 40 MG/1
40 TABLET ORAL DAILY
Qty: 30 TABLET | Refills: 11 | Status: SHIPPED | OUTPATIENT
Start: 2025-01-25 | End: 2026-01-25

## 2025-01-25 RX ORDER — FUROSEMIDE 40 MG/1
40 TABLET ORAL
Qty: 90 TABLET | Refills: 3 | OUTPATIENT
Start: 2025-01-25

## 2025-01-27 ENCOUNTER — DOCUMENTATION ONLY (OUTPATIENT)
Dept: TRANSPLANT | Facility: CLINIC | Age: 35
End: 2025-01-27
Payer: COMMERCIAL

## 2025-01-27 NOTE — PROGRESS NOTES
Pt CardioMems transmission received and review.          1/27/2025     9:22 AM 1/24/2025    10:45 AM 1/24/2025    10:44 AM 1/17/2025     9:46 AM 1/13/2025    10:17 AM 1/9/2025    10:23 AM 1/6/2025     8:45 AM   CardioMEMS Transmission    Transmission Date 1/27/2025 1/24/2025 1/20/2025 1/17/2025 1/13/2025 1/9/2025 1/6/2025   Transmission Type Maintenance Maintenance Maintenance Maintenance Maintenance Maintenance Maintenance   PAS 42 43 46 47 40 30 34   ALEKSANDER 33 34 33 36 33 19 24   PAD  28 30 28 30 29 13 17   Medication Adjustments  No No No No No No No         Pressures are stable.    Medications changed? No    Patient contacted? No    Will continue to monitor.

## 2025-01-31 ENCOUNTER — DOCUMENTATION ONLY (OUTPATIENT)
Dept: TRANSPLANT | Facility: CLINIC | Age: 35
End: 2025-01-31
Payer: COMMERCIAL

## 2025-01-31 ENCOUNTER — TELEPHONE (OUTPATIENT)
Dept: TRANSPLANT | Facility: CLINIC | Age: 35
End: 2025-01-31
Payer: COMMERCIAL

## 2025-01-31 NOTE — TELEPHONE ENCOUNTER
NN called patient to let her know that per Dr. Woodward patient can be switched from her sub-q Remodulin and transitioned to oral Orenitram. Patient was excited and asked how long she would have to be in the hospital for the medication transition. NN explained that the time frame is usually 4 or 5 days but It can take longer if the patient has issues tolerating the new medication. Patient then asked does that mean she would be off of the pump totally. NN stated yes you would only be on oral medications for your PH. Patient then asked what she would have to do with the Remodulin pump. NN explained that the specialty pharmacy would arrange for it to be sent back to them in the mail once the patient has been transitioned from Remodluin to Orenitram.     Nurse navigator explained to patient that orenitram is a PAH medication that helps/mimic prostacyclin that is a chemical that helps keep blood vessels open in the lungs. To make it easier for blood to pump through the lungs. Orenitram can help slow down the progression of PH. Orenitram is taken three times a day and should be every 8 hours to keep steady levels of medication to reduce side effects. It is a titratable drug so patient will be started at 0.125 mg and will increase as long as patient continues to tolerate.It should be taken with food to help minimize side effects. Orenitram should not be stopped abruptly and patient needs to report any side effects or urge to stop medication so it can be titrated down. Orenitram should be swallowed hole, do not split, chew or crush medication as it may release too much medicine at one time and can lead to side effects. If one dose is missed then take the missed dose as soon as possible with food. If 2 or more doses are missed than contact the office. Seeing shells of the capsules in stool is common but can be an issue with someone who has diverticulosis because shell may get stuck in blind pouch or diverticulum. Most common  side effects of orenitram include headcahe, nausea, vomiting, diarrhea, flushing, pain is arms/legs/jaw, etc. Patient verbalized understanding and was provided orenitram booklet and information. Nurse navigator sent in referral to [facility].

## 2025-01-31 NOTE — PROGRESS NOTES
Pt CardioMems transmission received and review.          1/31/2025    11:28 AM 1/27/2025     9:22 AM 1/24/2025    10:45 AM 1/24/2025    10:44 AM 1/17/2025     9:46 AM 1/13/2025    10:17 AM 1/9/2025    10:23 AM   CardioMEMS Transmission    Transmission Date 1/31/2025 1/27/2025 1/24/2025 1/20/2025 1/17/2025 1/13/2025 1/9/2025   Transmission Type Maintenance Maintenance Maintenance Maintenance Maintenance Maintenance Maintenance   PAS 46 42 43 46 47 40 30   ALEKSANDER 35 33 34 33 36 33 19   PAD  29 28 30 28 30 29 13   Medication Adjustments  No No No No No No No           Medications changed? No    Patient contacted? No    Will continue to monitor.

## 2025-02-04 ENCOUNTER — CLINICAL SUPPORT (OUTPATIENT)
Dept: OBSTETRICS AND GYNECOLOGY | Facility: CLINIC | Age: 35
End: 2025-02-04
Payer: COMMERCIAL

## 2025-02-04 DIAGNOSIS — Z79.890 HORMONE REPLACEMENT THERAPY (HRT): Primary | ICD-10-CM

## 2025-02-04 PROCEDURE — 96372 THER/PROPH/DIAG INJ SC/IM: CPT | Mod: S$GLB,,, | Performed by: OBSTETRICS & GYNECOLOGY

## 2025-02-04 RX ORDER — ESTRADIOL VALERATE 20 MG/ML
20 INJECTION INTRAMUSCULAR
Status: COMPLETED | OUTPATIENT
Start: 2025-02-04 | End: 2025-02-04

## 2025-02-04 RX ADMIN — ESTRADIOL VALERATE 20 MG: 20 INJECTION INTRAMUSCULAR at 01:02

## 2025-02-04 NOTE — PROGRESS NOTES
Estradiol valeraate given in LUG. Pt tolerated well no c/o. Left clinic ambulatory and in stable condition

## 2025-02-10 ENCOUNTER — LAB VISIT (OUTPATIENT)
Dept: LAB | Facility: HOSPITAL | Age: 35
End: 2025-02-10
Payer: COMMERCIAL

## 2025-02-10 ENCOUNTER — DOCUMENTATION ONLY (OUTPATIENT)
Dept: TRANSPLANT | Facility: CLINIC | Age: 35
End: 2025-02-10
Payer: COMMERCIAL

## 2025-02-10 DIAGNOSIS — Z79.899 POLYPHARMACY: ICD-10-CM

## 2025-02-10 DIAGNOSIS — R06.82 TACHYPNEA: ICD-10-CM

## 2025-02-10 LAB
BASOPHILS # BLD AUTO: 0.04 K/UL (ref 0–0.2)
BASOPHILS NFR BLD: 0.4 % (ref 0–1.9)
DIFFERENTIAL METHOD BLD: ABNORMAL
EOSINOPHIL # BLD AUTO: 0.2 K/UL (ref 0–0.5)
EOSINOPHIL NFR BLD: 2.1 % (ref 0–8)
ERYTHROCYTE [DISTWIDTH] IN BLOOD BY AUTOMATED COUNT: 15.2 % (ref 11.5–14.5)
HCT VFR BLD AUTO: 47 % (ref 37–48.5)
HGB BLD-MCNC: 15.1 G/DL (ref 12–16)
IMM GRANULOCYTES # BLD AUTO: 0.04 K/UL (ref 0–0.04)
IMM GRANULOCYTES NFR BLD AUTO: 0.4 % (ref 0–0.5)
LYMPHOCYTES # BLD AUTO: 1.3 K/UL (ref 1–4.8)
LYMPHOCYTES NFR BLD: 12 % (ref 18–48)
MCH RBC QN AUTO: 26.6 PG (ref 27–31)
MCHC RBC AUTO-ENTMCNC: 32.1 G/DL (ref 32–36)
MCV RBC AUTO: 83 FL (ref 82–98)
MONOCYTES # BLD AUTO: 0.5 K/UL (ref 0.3–1)
MONOCYTES NFR BLD: 5.1 % (ref 4–15)
NEUTROPHILS # BLD AUTO: 8.5 K/UL (ref 1.8–7.7)
NEUTROPHILS NFR BLD: 80 % (ref 38–73)
NRBC BLD-RTO: 0 /100 WBC
NT-PROBNP SERPL-MCNC: 120 PG/ML (ref 5–450)
PLATELET # BLD AUTO: 250 K/UL (ref 150–450)
PMV BLD AUTO: 10.3 FL (ref 9.2–12.9)
RBC # BLD AUTO: 5.67 M/UL (ref 4–5.4)
WBC # BLD AUTO: 10.61 K/UL (ref 3.9–12.7)

## 2025-02-10 PROCEDURE — 83880 ASSAY OF NATRIURETIC PEPTIDE: CPT

## 2025-02-10 PROCEDURE — 85025 COMPLETE CBC W/AUTO DIFF WBC: CPT

## 2025-02-10 PROCEDURE — 36415 COLL VENOUS BLD VENIPUNCTURE: CPT

## 2025-02-10 NOTE — PROGRESS NOTES
Pt CardioMems transmission received and review.          2/10/2025     8:58 AM 2/10/2025     8:57 AM 2/10/2025     8:56 AM 1/31/2025    11:28 AM 1/27/2025     9:22 AM 1/24/2025    10:45 AM 1/24/2025    10:44 AM   CardioMEMS Transmission    Transmission Date 2/10/2025 2/7/2025 2/3/2025 1/31/2025 1/27/2025 1/24/2025 1/20/2025   Transmission Type Maintenance Maintenance Maintenance Maintenance Maintenance Maintenance Maintenance   PAS 42 47 46 46 42 43 46   ALEKSANDER 33 37 36 35 33 34 33   PAD  28 30 31 29 28 30 28   Medication Adjustments  No No No No No No No           Medications changed? No    Patient contacted? No    Will continue to monitor.

## 2025-02-14 ENCOUNTER — DOCUMENTATION ONLY (OUTPATIENT)
Dept: TRANSPLANT | Facility: CLINIC | Age: 35
End: 2025-02-14
Payer: COMMERCIAL

## 2025-02-14 NOTE — PROGRESS NOTES
Pt CardioMems transmission received and review.          2/14/2025    10:32 AM 2/10/2025     8:58 AM 2/10/2025     8:57 AM 2/10/2025     8:56 AM 1/31/2025    11:28 AM 1/27/2025     9:22 AM 1/24/2025    10:45 AM   CardioMEMS Transmission    Transmission Date 2/14/2025 2/10/2025 2/7/2025 2/3/2025 1/31/2025 1/27/2025 1/24/2025   Transmission Type Maintenance Maintenance Maintenance Maintenance Maintenance Maintenance Maintenance   PAS 44 42 47 46 46 42 43   ALEKSANDER 35 33 37 36 35 33 34   PAD  29 28 30 31 29 28 30   Medication Adjustments  No No No No No No No           Medications changed? No    Patient contacted? No    Will continue to monitor.

## 2025-02-17 ENCOUNTER — DOCUMENTATION ONLY (OUTPATIENT)
Dept: TRANSPLANT | Facility: CLINIC | Age: 35
End: 2025-02-17
Payer: COMMERCIAL

## 2025-02-17 NOTE — PROGRESS NOTES
Pt CardioMems transmission received and review.          2/17/2025    10:15 AM 2/14/2025    10:32 AM 2/10/2025     8:58 AM 2/10/2025     8:57 AM 2/10/2025     8:56 AM 1/31/2025    11:28 AM 1/27/2025     9:22 AM   CardioMEMS Transmission    Transmission Date 2/17/2025 2/14/2025 2/10/2025 2/7/2025 2/3/2025 1/31/2025 1/27/2025   Transmission Type Maintenance Maintenance Maintenance Maintenance Maintenance Maintenance Maintenance   PAS 44 44 42 47 46 46 42   ALEKSANDER 34 35 33 37 36 35 33   PAD  28 29 28 30 31 29 28   Medication Adjustments  No No No No No No No         Pressures are stable.    Medications changed? No    Patient contacted? No    Will continue to monitor.

## 2025-02-18 ENCOUNTER — PATIENT MESSAGE (OUTPATIENT)
Dept: TRANSPLANT | Facility: CLINIC | Age: 35
End: 2025-02-18
Payer: COMMERCIAL

## 2025-02-20 ENCOUNTER — CLINICAL SUPPORT (OUTPATIENT)
Dept: TRANSPLANT | Facility: CLINIC | Age: 35
End: 2025-02-20
Payer: COMMERCIAL

## 2025-02-20 DIAGNOSIS — I50.42 CHRONIC COMBINED SYSTOLIC AND DIASTOLIC CHF, NYHA CLASS 3: Primary | ICD-10-CM

## 2025-02-21 ENCOUNTER — DOCUMENTATION ONLY (OUTPATIENT)
Dept: TRANSPLANT | Facility: CLINIC | Age: 35
End: 2025-02-21
Payer: COMMERCIAL

## 2025-02-21 NOTE — PROGRESS NOTES
Pt CardioMems transmission received and reviewed.          2/21/2025    10:50 AM 2/17/2025    10:15 AM 2/14/2025    10:32 AM 2/10/2025     8:58 AM 2/10/2025     8:57 AM 2/10/2025     8:56 AM 1/31/2025    11:28 AM   CardioMEMS Transmission    Transmission Date 2/21/2025 2/17/2025 2/14/2025 2/10/2025 2/7/2025 2/3/2025 1/31/2025   Transmission Type Maintenance Maintenance Maintenance Maintenance Maintenance Maintenance Maintenance   PAS 42 44 44 42 47 46 46   ALEKSANDER 33 34 35 33 37 36 35   PAD  29 28 29 28 30 31 29   Medication Adjustments  No No No No No No No         Cardiomems waveform interpretations, trends, and reports are monitored weekly via [email or web address]. Adjustments made per documentation. Will continue to monitor.

## 2025-02-21 NOTE — PROGRESS NOTES
Pt CardioMems transmission received and review.          2/21/2025    10:50 AM 2/17/2025    10:15 AM 2/14/2025    10:32 AM 2/10/2025     8:58 AM 2/10/2025     8:57 AM 2/10/2025     8:56 AM 1/31/2025    11:28 AM   CardioMEMS Transmission    Transmission Date 2/21/2025 2/17/2025 2/14/2025 2/10/2025 2/7/2025 2/3/2025 1/31/2025   Transmission Type Maintenance Maintenance Maintenance Maintenance Maintenance Maintenance Maintenance   PAS 42 44 44 42 47 46 46   ALEKSANDER 33 34 35 33 37 36 35   PAD  29 28 29 28 30 31 29   Medication Adjustments  No No No No No No No         Medications changed? No    Patient contacted? No    Will continue to monitor.

## 2025-02-24 ENCOUNTER — DOCUMENTATION ONLY (OUTPATIENT)
Dept: TRANSPLANT | Facility: CLINIC | Age: 35
End: 2025-02-24
Payer: COMMERCIAL

## 2025-02-24 NOTE — PROGRESS NOTES
Pt CardioMems transmission received and review.          2/24/2025     8:10 AM 2/21/2025    10:50 AM 2/17/2025    10:15 AM 2/14/2025    10:32 AM 2/10/2025     8:58 AM 2/10/2025     8:57 AM 2/10/2025     8:56 AM   CardioMEMS Transmission    Transmission Date 2/24/2025 2/21/2025 2/17/2025 2/14/2025 2/10/2025 2/7/2025 2/3/2025   Transmission Type Maintenance Maintenance Maintenance Maintenance Maintenance Maintenance Maintenance   PAS 46 42 44 44 42 47 46   ALEKSANDER 36 33 34 35 33 37 36   PAD  29 29 28 29 28 30 31   Medication Adjustments  No No No No No No No         Pressures are stable.    Medications changed? No    Patient contacted? No    Will continue to monitor.

## 2025-02-27 ENCOUNTER — TELEPHONE (OUTPATIENT)
Dept: TRANSPLANT | Facility: CLINIC | Age: 35
End: 2025-02-27
Payer: COMMERCIAL

## 2025-02-27 ENCOUNTER — LAB VISIT (OUTPATIENT)
Dept: LAB | Facility: HOSPITAL | Age: 35
End: 2025-02-27
Payer: COMMERCIAL

## 2025-02-27 DIAGNOSIS — Z79.899 POLYPHARMACY: ICD-10-CM

## 2025-02-27 LAB
BASOPHILS # BLD AUTO: 0.05 K/UL (ref 0–0.2)
BASOPHILS NFR BLD: 0.7 % (ref 0–1.9)
DIFFERENTIAL METHOD BLD: ABNORMAL
EOSINOPHIL # BLD AUTO: 0.4 K/UL (ref 0–0.5)
EOSINOPHIL NFR BLD: 5.2 % (ref 0–8)
ERYTHROCYTE [DISTWIDTH] IN BLOOD BY AUTOMATED COUNT: 15.2 % (ref 11.5–14.5)
HCT VFR BLD AUTO: 47.8 % (ref 37–48.5)
HGB BLD-MCNC: 15.9 G/DL (ref 12–16)
IMM GRANULOCYTES # BLD AUTO: 0.02 K/UL (ref 0–0.04)
IMM GRANULOCYTES NFR BLD AUTO: 0.3 % (ref 0–0.5)
LYMPHOCYTES # BLD AUTO: 1.4 K/UL (ref 1–4.8)
LYMPHOCYTES NFR BLD: 18.9 % (ref 18–48)
MCH RBC QN AUTO: 27.5 PG (ref 27–31)
MCHC RBC AUTO-ENTMCNC: 33.3 G/DL (ref 32–36)
MCV RBC AUTO: 83 FL (ref 82–98)
MONOCYTES # BLD AUTO: 0.4 K/UL (ref 0.3–1)
MONOCYTES NFR BLD: 6 % (ref 4–15)
NEUTROPHILS # BLD AUTO: 5.1 K/UL (ref 1.8–7.7)
NEUTROPHILS NFR BLD: 68.9 % (ref 38–73)
NRBC BLD-RTO: 0 /100 WBC
PLATELET # BLD AUTO: 240 K/UL (ref 150–450)
PMV BLD AUTO: 10.5 FL (ref 9.2–12.9)
RBC # BLD AUTO: 5.78 M/UL (ref 4–5.4)
WBC # BLD AUTO: 7.34 K/UL (ref 3.9–12.7)

## 2025-02-27 PROCEDURE — 36415 COLL VENOUS BLD VENIPUNCTURE: CPT | Performed by: INTERNAL MEDICINE

## 2025-02-27 PROCEDURE — 85025 COMPLETE CBC W/AUTO DIFF WBC: CPT | Performed by: INTERNAL MEDICINE

## 2025-02-27 NOTE — TELEPHONE ENCOUNTER
NN reviewed patient's labs with Dr. Woodward. Per Dr. Woodward patient is cleared to have her next Winrevair shot and we will not check CBC again until May due to stability. NN called the patient and informed her of the above orders by Dr. Woodward. NN explained to the patient that we just decided on her titration plan to transition from Remodulin to Orenitram. Patient stated she cannot d othe transition the week of March 7th nor the week of April 14th. Patient stated any other weeks we can make it work. NN explained that the Orenitram medication process will be started today and once we are close to getting the medication shipped we will confirm a hospital admission date for the transition. Patient verbalized understanding of all of the above information.    The patient is a 6m1w Female complaining of difficulty breathing.

## 2025-02-28 ENCOUNTER — TELEPHONE (OUTPATIENT)
Dept: TRANSPLANT | Facility: CLINIC | Age: 35
End: 2025-02-28
Payer: COMMERCIAL

## 2025-03-03 ENCOUNTER — DOCUMENTATION ONLY (OUTPATIENT)
Dept: TRANSPLANT | Facility: CLINIC | Age: 35
End: 2025-03-03
Payer: COMMERCIAL

## 2025-03-03 ENCOUNTER — CLINICAL SUPPORT (OUTPATIENT)
Dept: OBSTETRICS AND GYNECOLOGY | Facility: CLINIC | Age: 35
End: 2025-03-03
Payer: COMMERCIAL

## 2025-03-03 DIAGNOSIS — Z79.890 HORMONE REPLACEMENT THERAPY (HRT): Primary | ICD-10-CM

## 2025-03-03 PROCEDURE — 96372 THER/PROPH/DIAG INJ SC/IM: CPT | Mod: S$GLB,,, | Performed by: OBSTETRICS & GYNECOLOGY

## 2025-03-03 RX ORDER — ESTRADIOL VALERATE 20 MG/ML
20 INJECTION INTRAMUSCULAR
Status: COMPLETED | OUTPATIENT
Start: 2025-03-03 | End: 2025-03-03

## 2025-03-03 RX ADMIN — ESTRADIOL VALERATE 20 MG: 20 INJECTION INTRAMUSCULAR at 01:03

## 2025-03-03 NOTE — PROGRESS NOTES
Pt CardioMems transmission received and review.          3/3/2025     8:10 AM 3/3/2025     8:09 AM 2/24/2025     8:10 AM 2/21/2025    10:50 AM 2/17/2025    10:15 AM 2/14/2025    10:32 AM 2/10/2025     8:58 AM   CardioMEMS Transmission    Transmission Date 3/3/2025 2/28/2025 2/24/2025 2/21/2025 2/17/2025 2/14/2025 2/10/2025   Transmission Type Maintenance Maintenance Maintenance Maintenance Maintenance Maintenance Maintenance   PAS 50 48 46 42 44 44 42   ALEKSANDER 37 37 36 33 34 35 33   PAD  30 30 29 29 28 29 28   Medication Adjustments  No No No No No No No         Pressures are stable.    Medications changed? No    Patient contacted? No    Will continue to monitor.

## 2025-03-06 ENCOUNTER — DOCUMENTATION ONLY (OUTPATIENT)
Dept: TRANSPLANT | Facility: CLINIC | Age: 35
End: 2025-03-06
Payer: COMMERCIAL

## 2025-03-06 NOTE — PROGRESS NOTES
Pt CardioMems transmission received and review.          3/6/2025     7:21 AM 3/3/2025     8:10 AM 3/3/2025     8:09 AM 2/24/2025     8:10 AM 2/21/2025    10:50 AM 2/17/2025    10:15 AM 2/14/2025    10:32 AM   CardioMEMS Transmission    Transmission Date 3/6/2025 3/3/2025 2/28/2025 2/24/2025 2/21/2025 2/17/2025 2/14/2025   Transmission Type Maintenance Maintenance Maintenance Maintenance Maintenance Maintenance Maintenance   PAS 50 50 48 46 42 44 44   ALEKSANDER 37 37 37 36 33 34 35   PAD  29 30 30 29 29 28 29   Medication Adjustments  No No No No No No No           Medications changed? No    Patient contacted? No    Will continue to monitor.

## 2025-03-10 ENCOUNTER — DOCUMENTATION ONLY (OUTPATIENT)
Dept: TRANSPLANT | Facility: CLINIC | Age: 35
End: 2025-03-10
Payer: COMMERCIAL

## 2025-03-10 NOTE — PROGRESS NOTES
Pt CardioMems transmission received and review.          3/10/2025    11:03 AM 3/6/2025     7:21 AM 3/3/2025     8:10 AM 3/3/2025     8:09 AM 2/24/2025     8:10 AM 2/21/2025    10:50 AM 2/17/2025    10:15 AM   CardioMEMS Transmission    Transmission Date 3/10/2025 3/6/2025 3/3/2025 2/28/2025 2/24/2025 2/21/2025 2/17/2025   Transmission Type Maintenance Maintenance Maintenance Maintenance Maintenance Maintenance Maintenance   PAS 51 50 50 48 46 42 44   ALEKSANDER 40 37 37 37 36 33 34   PAD  34 29 30 30 29 29 28   Medication Adjustments  No No No No No No No         Pressures are stable.    Medications changed? No    Patient contacted? No    Will continue to monitor.

## 2025-03-13 ENCOUNTER — PATIENT MESSAGE (OUTPATIENT)
Dept: TRANSPLANT | Facility: CLINIC | Age: 35
End: 2025-03-13
Payer: COMMERCIAL

## 2025-03-18 ENCOUNTER — DOCUMENTATION ONLY (OUTPATIENT)
Dept: TRANSPLANT | Facility: CLINIC | Age: 35
End: 2025-03-18
Payer: COMMERCIAL

## 2025-03-18 ENCOUNTER — PATIENT MESSAGE (OUTPATIENT)
Dept: TRANSPLANT | Facility: CLINIC | Age: 35
End: 2025-03-18
Payer: COMMERCIAL

## 2025-03-18 ENCOUNTER — TELEPHONE (OUTPATIENT)
Dept: TRANSPLANT | Facility: CLINIC | Age: 35
End: 2025-03-18
Payer: COMMERCIAL

## 2025-03-18 NOTE — TELEPHONE ENCOUNTER
NN called [facility] to see where the patient's PA for Winrevair is in the process. NN was told that the patient needed to call them at [phone] to finalize the PA. NN called the patient and asked that she call [facility]. Patient was given number and steps to get on the phone with the correct staff to complete the needed steps for the Winrevair PA in process. NN asked that the patient message or call NN back after speaking with [facility]. Patient verbalized understanding of all of the above information.

## 2025-03-18 NOTE — PROGRESS NOTES
Pt CardioMems transmission received and review.          3/18/2025     2:38 PM 3/18/2025     2:37 PM 3/10/2025    11:03 AM 3/6/2025     7:21 AM 3/3/2025     8:10 AM 3/3/2025     8:09 AM 2/24/2025     8:10 AM   CardioMEMS Transmission    Transmission Date 3/18/2025 3/14/2025 3/10/2025 3/6/2025 3/3/2025 2/28/2025 2/24/2025   Transmission Type Maintenance Maintenance Maintenance Maintenance Maintenance Maintenance Maintenance   PAS 44 56 51 50 50 48 46   ALEKSANDER 35 40 40 37 37 37 36   PAD  28 31 34 29 30 30 29   Medication Adjustments  No No No No No No No         Pressures are stable.    Medications changed? No    Patient contacted? No    Will continue to monitor.

## 2025-03-20 ENCOUNTER — CLINICAL SUPPORT (OUTPATIENT)
Dept: TRANSPLANT | Facility: CLINIC | Age: 35
End: 2025-03-20
Payer: COMMERCIAL

## 2025-03-20 DIAGNOSIS — I50.42 CHRONIC COMBINED SYSTOLIC AND DIASTOLIC CHF, NYHA CLASS 3: Primary | ICD-10-CM

## 2025-03-20 PROCEDURE — 99213 OFFICE O/P EST LOW 20 MIN: CPT | Mod: S$GLB,,, | Performed by: INTERNAL MEDICINE

## 2025-03-21 ENCOUNTER — TELEPHONE (OUTPATIENT)
Dept: TRANSPLANT | Facility: CLINIC | Age: 35
End: 2025-03-21
Payer: COMMERCIAL

## 2025-03-21 NOTE — TELEPHONE ENCOUNTER
NN received a message from the patient asking about her Orenitram. NN called the patient to clarify the question as it was not clear in the message. The patient stated she wasn't even sure what [facility] was a saying about the Orenitram. NN explained to the patient that the PA has been sent in and it is in process with her insurance company. NN stated that once the AP is done then the patient's admission date can be set. Patient verbalized understanding of all of the above information.

## 2025-03-24 ENCOUNTER — TELEPHONE (OUTPATIENT)
Dept: TRANSPLANT | Facility: CLINIC | Age: 35
End: 2025-03-24
Payer: COMMERCIAL

## 2025-03-25 ENCOUNTER — TELEPHONE (OUTPATIENT)
Dept: TRANSPLANT | Facility: CLINIC | Age: 35
End: 2025-03-25
Payer: COMMERCIAL

## 2025-03-25 ENCOUNTER — DOCUMENTATION ONLY (OUTPATIENT)
Dept: TRANSPLANT | Facility: CLINIC | Age: 35
End: 2025-03-25
Payer: COMMERCIAL

## 2025-03-25 NOTE — PROGRESS NOTES
Pt CardioMems transmission received and review.          3/25/2025     9:37 AM 3/18/2025     2:38 PM 3/18/2025     2:37 PM 3/10/2025    11:03 AM 3/6/2025     7:21 AM 3/3/2025     8:10 AM 3/3/2025     8:09 AM   CardioMEMS Transmission    Transmission Date 3/25/2025 3/18/2025 3/14/2025 3/10/2025 3/6/2025 3/3/2025 2/28/2025   Transmission Type Maintenance Maintenance Maintenance Maintenance Maintenance Maintenance Maintenance   PAS 46 44 56 51 50 50 48   ALEKSANDER 37 35 40 40 37 37 37   PAD  30 28 31 34 29 30 30   Medication Adjustments  No No No No No No No         Pressures are stable.    Medications changed? No    Patient contacted? No    Will continue to monitor.

## 2025-03-27 ENCOUNTER — TELEPHONE (OUTPATIENT)
Dept: TRANSPLANT | Facility: CLINIC | Age: 35
End: 2025-03-27
Payer: COMMERCIAL

## 2025-03-27 NOTE — TELEPHONE ENCOUNTER
LUKE called [facility] to inquire about th patient's Winrevair and Orenitram medication and where they are in the process. NN spoke to Barbara at [facility]. Barbara stated that they still need to speak to the patient regarding the Orenitram medication. LUKE verified with Barbara that they faxed the denial letter for the patient's Winrevair yesterday to [employer] the . Barbara stated that [facility] will call them either tomorrow or Monday and hopefully finalize the Winrevair by end of next week. LUKE then stated that the patient will be called and asked to call [facility] to continue to process for the Orenitram. Barbara stated he would call the patient directly after NN so the patient does not have to wait on hold with the automated prompts. LUKE then called the patient and gave her the above updates. Patient stated she will await Barbara's call from [facility] and she has the number for [facility] if needed.

## 2025-03-30 NOTE — PROGRESS NOTES
Pt CardioMems transmission received and reviewed.          3/25/2025     9:37 AM 3/18/2025     2:38 PM 3/18/2025     2:37 PM 3/10/2025    11:03 AM 3/6/2025     7:21 AM 3/3/2025     8:10 AM 3/3/2025     8:09 AM   CardioMEMS Transmission    Transmission Date 3/25/2025 3/18/2025 3/14/2025 3/10/2025 3/6/2025 3/3/2025 2/28/2025   Transmission Type Maintenance Maintenance Maintenance Maintenance Maintenance Maintenance Maintenance   PAS 46 44 56 51 50 50 48   ALEKSANDER 37 35 40 40 37 37 37   PAD  30 28 31 34 29 30 30   Medication Adjustments  No No No No No No No         Cardiomems waveform interpretations, trends, and reports are monitored weekly via [email or web address]. Adjustments made per documentation. Will continue to monitor.

## 2025-03-31 ENCOUNTER — TELEPHONE (OUTPATIENT)
Dept: TRANSPLANT | Facility: CLINIC | Age: 35
End: 2025-03-31
Payer: COMMERCIAL

## 2025-04-01 ENCOUNTER — TELEPHONE (OUTPATIENT)
Dept: TRANSPLANT | Facility: CLINIC | Age: 35
End: 2025-04-01
Payer: COMMERCIAL

## 2025-04-03 ENCOUNTER — PATIENT MESSAGE (OUTPATIENT)
Dept: TRANSPLANT | Facility: CLINIC | Age: 35
End: 2025-04-03
Payer: COMMERCIAL

## 2025-04-03 ENCOUNTER — CLINICAL SUPPORT (OUTPATIENT)
Dept: OBSTETRICS AND GYNECOLOGY | Facility: CLINIC | Age: 35
End: 2025-04-03
Payer: COMMERCIAL

## 2025-04-03 DIAGNOSIS — Z79.890 HORMONE REPLACEMENT THERAPY (HRT): Primary | ICD-10-CM

## 2025-04-03 PROCEDURE — 96372 THER/PROPH/DIAG INJ SC/IM: CPT | Mod: S$GLB,,, | Performed by: OBSTETRICS & GYNECOLOGY

## 2025-04-03 RX ORDER — ESTRADIOL VALERATE 20 MG/ML
20 INJECTION INTRAMUSCULAR
Status: COMPLETED | OUTPATIENT
Start: 2025-04-03 | End: 2025-04-03

## 2025-04-03 RX ADMIN — ESTRADIOL VALERATE 20 MG: 20 INJECTION INTRAMUSCULAR at 01:04

## 2025-04-07 ENCOUNTER — PATIENT MESSAGE (OUTPATIENT)
Dept: TRANSPLANT | Facility: CLINIC | Age: 35
End: 2025-04-07
Payer: COMMERCIAL

## 2025-04-09 ENCOUNTER — DOCUMENTATION ONLY (OUTPATIENT)
Dept: TRANSPLANT | Facility: CLINIC | Age: 35
End: 2025-04-09
Payer: COMMERCIAL

## 2025-04-09 NOTE — PROGRESS NOTES
Pt CardioMems transmission received and review.          4/9/2025    11:29 AM 4/9/2025    11:28 AM 4/9/2025    11:27 AM 4/9/2025    11:26 AM 3/25/2025     9:37 AM 3/18/2025     2:38 PM 3/18/2025     2:37 PM   CardioMEMS Transmission    Transmission Date 4/9/2025 4/4/2025 4/1/2025 3/28/2025 3/25/2025 3/18/2025 3/14/2025   Transmission Type Maintenance Maintenance Maintenance Maintenance Maintenance Maintenance Maintenance   PAS 46 47 50 54 46 44 56   ALEKSANDER 33 37 37 39 37 35 40   PAD  28 32 30 30 30 28 31   Medication Adjustments  No No No No No No No           Medications changed? No    Patient contacted? No    Will continue to monitor.

## 2025-04-20 NOTE — TELEPHONE ENCOUNTER
Refill Routing Note   Medication(s) are not appropriate for processing by [facility] for the following reason(s):        Required labs outdated    [facility] action(s):  Defer             Appointments  past 12m or future 3m with PCP    Date Provider   Last Visit   10/30/2024 Ashanti Turner MD   Next Visit   Visit date not found Ashanti Turner MD   ED visits in past 90 days: 0        Note composed:10:50 AM 04/20/2025

## 2025-04-21 RX ORDER — LEVOTHYROXINE SODIUM 175 UG/1
175 TABLET ORAL EVERY MORNING
Qty: 90 TABLET | Refills: 0 | Status: SHIPPED | OUTPATIENT
Start: 2025-04-21

## 2025-04-23 ENCOUNTER — CLINICAL SUPPORT (OUTPATIENT)
Dept: TRANSPLANT | Facility: CLINIC | Age: 35
End: 2025-04-23
Payer: COMMERCIAL

## 2025-04-23 DIAGNOSIS — I50.42 CHRONIC COMBINED SYSTOLIC AND DIASTOLIC CHF, NYHA CLASS 3: Primary | ICD-10-CM

## 2025-04-23 PROCEDURE — 99213 OFFICE O/P EST LOW 20 MIN: CPT | Mod: S$GLB,,, | Performed by: INTERNAL MEDICINE

## 2025-04-28 ENCOUNTER — LAB VISIT (OUTPATIENT)
Dept: LAB | Facility: HOSPITAL | Age: 35
End: 2025-04-28
Attending: INTERNAL MEDICINE
Payer: COMMERCIAL

## 2025-04-28 ENCOUNTER — TELEPHONE (OUTPATIENT)
Dept: TRANSPLANT | Facility: CLINIC | Age: 35
End: 2025-04-28
Payer: COMMERCIAL

## 2025-04-28 ENCOUNTER — PATIENT MESSAGE (OUTPATIENT)
Dept: TRANSPLANT | Facility: CLINIC | Age: 35
End: 2025-04-28
Payer: COMMERCIAL

## 2025-04-28 DIAGNOSIS — R06.82 TACHYPNEA: ICD-10-CM

## 2025-04-28 DIAGNOSIS — Z79.899 POLYPHARMACY: ICD-10-CM

## 2025-04-28 LAB
ABSOLUTE EOSINOPHIL (OHS): 0.5 K/UL
ABSOLUTE MONOCYTE (OHS): 0.47 K/UL (ref 0.3–1)
ABSOLUTE NEUTROPHIL COUNT (OHS): 6.97 K/UL (ref 1.8–7.7)
ALBUMIN SERPL BCP-MCNC: 4 G/DL (ref 3.5–5.2)
ALP SERPL-CCNC: 150 UNIT/L (ref 40–150)
ALT SERPL W/O P-5'-P-CCNC: 25 UNIT/L (ref 10–44)
ANION GAP (OHS): 10 MMOL/L (ref 8–16)
AST SERPL-CCNC: 21 UNIT/L (ref 11–45)
BASOPHILS # BLD AUTO: 0.04 K/UL
BASOPHILS NFR BLD AUTO: 0.4 %
BILIRUB SERPL-MCNC: 0.4 MG/DL (ref 0.1–1)
BUN SERPL-MCNC: 15 MG/DL (ref 6–20)
CALCIUM SERPL-MCNC: 8.5 MG/DL (ref 8.7–10.5)
CHLORIDE SERPL-SCNC: 104 MMOL/L (ref 95–110)
CO2 SERPL-SCNC: 23 MMOL/L (ref 23–29)
CREAT SERPL-MCNC: 0.7 MG/DL (ref 0.5–1.4)
ERYTHROCYTE [DISTWIDTH] IN BLOOD BY AUTOMATED COUNT: 14.2 % (ref 11.5–14.5)
GFR SERPLBLD CREATININE-BSD FMLA CKD-EPI: >60 ML/MIN/1.73/M2
GLUCOSE SERPL-MCNC: 104 MG/DL (ref 70–110)
HCT VFR BLD AUTO: 44.6 % (ref 37–48.5)
HGB BLD-MCNC: 15.1 GM/DL (ref 12–16)
IMM GRANULOCYTES # BLD AUTO: 0.04 K/UL (ref 0–0.04)
IMM GRANULOCYTES NFR BLD AUTO: 0.4 % (ref 0–0.5)
LYMPHOCYTES # BLD AUTO: 1.39 K/UL (ref 1–4.8)
MCH RBC QN AUTO: 27.9 PG (ref 27–31)
MCHC RBC AUTO-ENTMCNC: 33.9 G/DL (ref 32–36)
MCV RBC AUTO: 82 FL (ref 82–98)
NT-PROBNP SERPL-MCNC: 54 PG/ML
NUCLEATED RBC (/100WBC) (OHS): 0 /100 WBC
PLATELET # BLD AUTO: 263 K/UL (ref 150–450)
PMV BLD AUTO: 10.2 FL (ref 9.2–12.9)
POTASSIUM SERPL-SCNC: 3.6 MMOL/L (ref 3.5–5.1)
PROT SERPL-MCNC: 7.4 GM/DL (ref 6–8.4)
RBC # BLD AUTO: 5.41 M/UL (ref 4–5.4)
RELATIVE EOSINOPHIL (OHS): 5.3 %
RELATIVE LYMPHOCYTE (OHS): 14.8 % (ref 18–48)
RELATIVE MONOCYTE (OHS): 5 % (ref 4–15)
RELATIVE NEUTROPHIL (OHS): 74.1 % (ref 38–73)
SODIUM SERPL-SCNC: 137 MMOL/L (ref 136–145)
WBC # BLD AUTO: 9.41 K/UL (ref 3.9–12.7)

## 2025-04-28 PROCEDURE — 80053 COMPREHEN METABOLIC PANEL: CPT

## 2025-04-28 PROCEDURE — 36415 COLL VENOUS BLD VENIPUNCTURE: CPT

## 2025-04-28 PROCEDURE — 83880 ASSAY OF NATRIURETIC PEPTIDE: CPT

## 2025-04-28 PROCEDURE — 85025 COMPLETE CBC W/AUTO DIFF WBC: CPT

## 2025-04-28 NOTE — PROGRESS NOTES
Pt CardioMems transmission received and reviewed.          4/9/2025    11:29 AM 4/9/2025    11:28 AM 4/9/2025    11:27 AM 4/9/2025    11:26 AM 3/25/2025     9:37 AM 3/18/2025     2:38 PM 3/18/2025     2:37 PM   CardioMEMS Transmission    Transmission Date 4/9/2025 4/4/2025 4/1/2025 3/28/2025 3/25/2025 3/18/2025 3/14/2025   Transmission Type Maintenance Maintenance Maintenance Maintenance Maintenance Maintenance Maintenance   PAS 46 47 50 54 46 44 56   ALEKSANDER 33 37 37 39 37 35 40   PAD  28 32 30 30 30 28 31   Medication Adjustments  No No No No No No No         Cardiomems waveform interpretations, trends, and reports are monitored weekly via [email or web address]. Adjustments made per documentation. Will continue to monitor.

## 2025-04-28 NOTE — TELEPHONE ENCOUNTER
NN discussed the patient's Winrevair results with Dr. Cuello. Per Dr. Cuello patient can have her next Winrevair shot. NN messaged patient in my chart/patient portal that she has been cleared to take her Winrevair shot on 5/5/2025.

## 2025-04-29 ENCOUNTER — PATIENT MESSAGE (OUTPATIENT)
Dept: TRANSPLANT | Facility: CLINIC | Age: 35
End: 2025-04-29
Payer: COMMERCIAL

## 2025-05-02 ENCOUNTER — DOCUMENTATION ONLY (OUTPATIENT)
Dept: TRANSPLANT | Facility: CLINIC | Age: 35
End: 2025-05-02
Payer: COMMERCIAL

## 2025-05-02 ENCOUNTER — CLINICAL SUPPORT (OUTPATIENT)
Dept: OBSTETRICS AND GYNECOLOGY | Facility: CLINIC | Age: 35
End: 2025-05-02
Payer: COMMERCIAL

## 2025-05-02 ENCOUNTER — PATIENT MESSAGE (OUTPATIENT)
Dept: TRANSPLANT | Facility: CLINIC | Age: 35
End: 2025-05-02
Payer: COMMERCIAL

## 2025-05-02 ENCOUNTER — TELEPHONE (OUTPATIENT)
Dept: TRANSPLANT | Facility: CLINIC | Age: 35
End: 2025-05-02
Payer: COMMERCIAL

## 2025-05-02 DIAGNOSIS — Z79.890 HORMONE REPLACEMENT THERAPY (HRT): Primary | ICD-10-CM

## 2025-05-02 RX ORDER — ESTRADIOL VALERATE 20 MG/ML
20 INJECTION INTRAMUSCULAR
Status: COMPLETED | OUTPATIENT
Start: 2025-05-02 | End: 2025-05-02

## 2025-05-02 RX ADMIN — ESTRADIOL VALERATE 20 MG: 20 INJECTION INTRAMUSCULAR at 11:05

## 2025-05-02 NOTE — TELEPHONE ENCOUNTER
NN called the patient to discuss any questions or concerns before she comes into the hospital for her transition from Remodulin to Orentiram. Patient stated she had one question does she need to bring her Cardiomems pillow into the hospital with her. NN told the patient she would get with the providers and send her a message in the patient portal/my chart. NN discussed it with Dr. Rolon who stated that we have a Cardiomems reader here in the hospital that we can use. NN then sent the patient a message in the patient portal/my chart.

## 2025-05-02 NOTE — PROGRESS NOTES
Ms. 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 She has been followed by our clinic since 2022 and is on subcutaenous treprostinil, combination macitentan-tadalafil, and was recently started on sotatercept for her severe PH. She underwent a RHC on 1/10/25 which showed dramatic improvement in her PA pressures and PVR when compared with prior. (When compared to RHC done last year, there has been a significant improvement in her PA pressures (improved from PA=93/26 mm of Hg with PA mean=51 mm of Hg and PVR of 8 to PA= 59/23 mm of Hg, PA mean=36 mm of Hg and PVR=5.4). Given these improvements, and her low risk REVEAL score, and after discussing with the patient the decision was made to admit her to the hospital for monitored transitioning of her remodulin to orenitram (oral treprostinil). This was decided as this was the safest way to transition someone with her level of severity of PH.    Vito Woodward MD

## 2025-05-05 ENCOUNTER — HOSPITAL ENCOUNTER (INPATIENT)
Facility: HOSPITAL | Age: 35
LOS: 3 days | Discharge: HOME | DRG: 315 | End: 2025-05-08
Attending: INTERNAL MEDICINE | Admitting: INTERNAL MEDICINE
Payer: COMMERCIAL

## 2025-05-05 DIAGNOSIS — I27.20 PULMONARY HYPERTENSION: Primary | ICD-10-CM

## 2025-05-05 DIAGNOSIS — I27.0 PRIMARY PULMONARY HYPERTENSION: ICD-10-CM

## 2025-05-05 LAB
ABSOLUTE EOSINOPHIL (OHS): 0.61 K/UL
ABSOLUTE MONOCYTE (OHS): 0.48 K/UL (ref 0.3–1)
ABSOLUTE NEUTROPHIL COUNT (OHS): 8.39 K/UL (ref 1.8–7.7)
ALBUMIN SERPL BCP-MCNC: 3.9 G/DL (ref 3.5–5.2)
ALP SERPL-CCNC: 173 UNIT/L (ref 40–150)
ALT SERPL W/O P-5'-P-CCNC: 28 UNIT/L (ref 10–44)
ANION GAP (OHS): 12 MMOL/L (ref 8–16)
AST SERPL-CCNC: 23 UNIT/L (ref 11–45)
BASOPHILS # BLD AUTO: 0.06 K/UL
BASOPHILS NFR BLD AUTO: 0.6 %
BILIRUB SERPL-MCNC: 0.5 MG/DL (ref 0.1–1)
BUN SERPL-MCNC: 13 MG/DL (ref 6–20)
CALCIUM SERPL-MCNC: 8.4 MG/DL (ref 8.7–10.5)
CHLORIDE SERPL-SCNC: 104 MMOL/L (ref 95–110)
CO2 SERPL-SCNC: 21 MMOL/L (ref 23–29)
CREAT SERPL-MCNC: 0.7 MG/DL (ref 0.5–1.4)
ERYTHROCYTE [DISTWIDTH] IN BLOOD BY AUTOMATED COUNT: 14.1 % (ref 11.5–14.5)
GFR SERPLBLD CREATININE-BSD FMLA CKD-EPI: >60 ML/MIN/1.73/M2
GLUCOSE SERPL-MCNC: 89 MG/DL (ref 70–110)
HCT VFR BLD AUTO: 47.4 % (ref 37–48.5)
HGB BLD-MCNC: 15.9 GM/DL (ref 12–16)
IMM GRANULOCYTES # BLD AUTO: 0.06 K/UL (ref 0–0.04)
IMM GRANULOCYTES NFR BLD AUTO: 0.6 % (ref 0–0.5)
LYMPHOCYTES # BLD AUTO: 1.23 K/UL (ref 1–4.8)
MAGNESIUM SERPL-MCNC: 2 MG/DL (ref 1.6–2.6)
MCH RBC QN AUTO: 27.8 PG (ref 27–31)
MCHC RBC AUTO-ENTMCNC: 33.5 G/DL (ref 32–36)
MCV RBC AUTO: 83 FL (ref 82–98)
NUCLEATED RBC (/100WBC) (OHS): 0 /100 WBC
PLATELET # BLD AUTO: 272 K/UL (ref 150–450)
PMV BLD AUTO: 11.2 FL (ref 9.2–12.9)
POTASSIUM SERPL-SCNC: 3.5 MMOL/L (ref 3.5–5.1)
PROT SERPL-MCNC: 7.8 GM/DL (ref 6–8.4)
RBC # BLD AUTO: 5.72 M/UL (ref 4–5.4)
RELATIVE EOSINOPHIL (OHS): 5.6 %
RELATIVE LYMPHOCYTE (OHS): 11.4 % (ref 18–48)
RELATIVE MONOCYTE (OHS): 4.4 % (ref 4–15)
RELATIVE NEUTROPHIL (OHS): 77.4 % (ref 38–73)
SODIUM SERPL-SCNC: 137 MMOL/L (ref 136–145)
WBC # BLD AUTO: 10.83 K/UL (ref 3.9–12.7)

## 2025-05-05 PROCEDURE — 82040 ASSAY OF SERUM ALBUMIN: CPT | Performed by: NURSE PRACTITIONER

## 2025-05-05 PROCEDURE — 20600001 HC STEP DOWN PRIVATE ROOM

## 2025-05-05 PROCEDURE — 85025 COMPLETE CBC W/AUTO DIFF WBC: CPT | Performed by: NURSE PRACTITIONER

## 2025-05-05 PROCEDURE — 25000003 PHARM REV CODE 250: Performed by: NURSE PRACTITIONER

## 2025-05-05 PROCEDURE — 36415 COLL VENOUS BLD VENIPUNCTURE: CPT | Performed by: NURSE PRACTITIONER

## 2025-05-05 PROCEDURE — 63600175 PHARM REV CODE 636 W HCPCS: Mod: TB | Performed by: INTERNAL MEDICINE

## 2025-05-05 PROCEDURE — 25000003 PHARM REV CODE 250: Performed by: INTERNAL MEDICINE

## 2025-05-05 PROCEDURE — 83735 ASSAY OF MAGNESIUM: CPT | Performed by: NURSE PRACTITIONER

## 2025-05-05 RX ORDER — PREGABALIN 50 MG/1
100 CAPSULE ORAL NIGHTLY
Status: DISCONTINUED | OUTPATIENT
Start: 2025-05-05 | End: 2025-05-08 | Stop reason: HOSPADM

## 2025-05-05 RX ORDER — ASPIRIN 81 MG/1
81 TABLET ORAL DAILY
Status: DISCONTINUED | OUTPATIENT
Start: 2025-05-06 | End: 2025-05-08 | Stop reason: HOSPADM

## 2025-05-05 RX ORDER — SPIRONOLACTONE 25 MG/1
25 TABLET ORAL DAILY
Status: DISCONTINUED | OUTPATIENT
Start: 2025-05-06 | End: 2025-05-08 | Stop reason: HOSPADM

## 2025-05-05 RX ORDER — FUROSEMIDE 40 MG/1
40 TABLET ORAL DAILY
Status: DISCONTINUED | OUTPATIENT
Start: 2025-05-06 | End: 2025-05-08 | Stop reason: HOSPADM

## 2025-05-05 RX ORDER — SODIUM CHLORIDE 0.9 % (FLUSH) 0.9 %
10 SYRINGE (ML) INJECTION
Status: DISCONTINUED | OUTPATIENT
Start: 2025-05-05 | End: 2025-05-08 | Stop reason: HOSPADM

## 2025-05-05 RX ADMIN — TREPROSTINIL 30 NG/KG/MIN: 200 INJECTION, SOLUTION INTRAVENOUS; SUBCUTANEOUS at 03:05

## 2025-05-05 RX ADMIN — TREPROSTINIL 2 MG: 1 TABLET, EXTENDED RELEASE ORAL at 03:05

## 2025-05-05 RX ADMIN — PREGABALIN 100 MG: 50 CAPSULE ORAL at 08:05

## 2025-05-05 RX ADMIN — TREPROSTINIL 2 MG: 1 TABLET, EXTENDED RELEASE ORAL at 08:05

## 2025-05-05 NOTE — PROGRESS NOTES
Admit Note     Met with patient to assess needs. Patient is a 34 y.o.  female, admitted for WHO Group 1 pulmonary hypertension, obesity, and thyroid disease.      Patient admitted to [facility] on 5/5/2025 .  Pt presents in room with her spouse, Jeremi.  At this time, patient presents as alert and oriented x 4, pleasant, good eye contact, calm, communicative, cooperative, and asking and answering questions appropriately.  At this time, patients caregiver presents as alert and oriented x 4, pleasant, good eye contact, calm, communicative, cooperative, and asking and answering questions appropriately.    Household/Family Systems     Patient resides with patient's spouse, their 12 year old son and pt's 20 year old stepson    Address   [address] [place] [id number].    Pt phone number: [phone]    Emergency contact  Jeremi Sarmiento, spouse, works as a , drives: [phone]    Pt declined to list additional emergency contacts at this time.      Support system includes pt's spouse, mother, aunts.  Patient does have dependents that are in need of being cared for. Patient's 12 year old son is being cared for by pt's 20 year old son.  SW attempted to elicit information regarding pt's mother and aunts as additional contacts.  Pt declined stating her spouse Jeremi is the main point of contact for the family.     Patients primary caregiver is self with support from spouse as needed    During admission, patient's caregiver plans to stay in patient's room.  Confirmed patient and patients caregivers do have access to reliable transportation.    Cognitive Status/Learning     Patient reports reading ability as 12th grade and states patient does not have difficulty with reading, writing, seeing, hearing, comprehension, learning, and memory.  Patient reports patient learns best by watching and observing.   Needed: No.   Highest education level: High School (9-12) or GED    Vocation/Disability    .  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  Pt and spouse deny financial difficulties at this time    Adherence     Patient reports a high level of adherence to patients health care regimen. Pt reports she takes her medications as prescribed, attends her doctor's appointments as scheduled and adheres to a low sodium diet with high protein.     Substance Use    Patient reports the following substance usage.    Tobacco: Pt denies current use.  Pt reports she used smoke 1 pack of cigarettes a day for about 5 years.  Pt reports she quit cold turkey in .  Alcohol: Pt reports she will gave a glass of wine every 3-4 months.  Illicit Drugs/Non-prescribed Medications: none, patient denies any use.  Patient states clear understanding of the potential impact of substance use.  Substance abstinence/cessation counseling, education and resources provided and reviewed.     Services Utilizing/ADLS    Infusion Service: Prior to admission, patient utilizing? yes.  Pt is currently on Remondulin provided by [facility] speciality pharmacy. Pt is currently transition to [facility]  Home Health: Prior to admission, patient utilizing? no  DME: Prior to admission, no  Pulmonary/Cardiac Rehab: Prior to admission, no  Dialysis:  Prior to admission, no  Transplant Specialty Pharmacy:  Prior to admission, no.    Prior to admission, patient reports patient was independent with ADLS and was driving.  Patient reports patient is able to care for self at this time.    Insurance/Medications    Insured by   Payer/Plan Subscr  Sex Relation Sub. Ins. ID Effective Group Num   1. [facility]* MANOHAR BUTLER 1981 Male Spouse [id number] [facility] [address] [address]      Primary Insurance (for UNOS reporting): Private Insurance  Secondary Insurance (for UNOS reporting): None    Patient reports patient is able to obtain and afford medications at  this time and at time of discharge.    Living Will/Healthcare Power of     Patient states patient has a LW and/or HCPA.  HCPA form uploaded in Epic.  Pt's spouse listed as sole contact.     Coping/Mental Health    Patient is coping adequately with the aid of  family members.  Patient denies current mental health difficulties. Pt reports she had some depression and anxiety regarding health issues last year, specifically endometriosis and fibroids.  Pt reports she had a hysterectomy last year and reports she had some mood swings but it did not require needed therapy or psych meds.  Pt reports feeling hopeful about getting her health back on track and is looking forward to transition to PO orinetram and not be dependent on IV Remodulin.  SW provided support and encouragement.     Discharge Planning    At time of discharge, patient plans to return to patient's home under the care of self with support from spouse as needed.  Patients spouse will transport patient.  Per rounds today, expected discharge date has not been medically determined at this time. Patient and patients caregiver  verbalize understanding and are involved in treatment planning and discharge process.    Additional Concerns    Patient's caretaker denies additional needs and/or concerns at this time. Patient is being followed for needs, education, resources, information, emotional support, supportive counseling, and for supportive and skilled discharge plan of care.  providing ongoing psychosocial support, education, resources and d/c planning as needed.  SW remains available. Patient denies additional needs and/or concerns at this time. Patient verbalizes understanding and agreement with information reviewed, social work availability, and how to access available resources as needed.

## 2025-05-05 NOTE — H&P
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 She underwent a RHC on 1/10/25 which showed dramatic improvement in her PA pressures and PVR when compared with prior. (When compared to RHC done last year, there has been a significant improvement in her PA pressures (improved from PA=93/26 mm of Hg with PA mean=51 mm of Hg and PVR of 8 to PA= 59/23 mm of Hg, PA mean=36 mm of Hg and PVR=5.4). Given these improvements, and her low risk REVEAL score, and after discussing with the patient the decision was made to admit her to the hospital for monitored transitioning of her remodulin to orenitram (oral treprostinil). She currently endorses no complaints and understands the plan. Denies CP, palpitations, syncope, presyncope, fevers, n/v/d, PND, orthopnea. She brought her home medications as well.       Past Medical History:   Diagnosis Date    BMI 32.0-32.9,adult 11/9/2022    Migraine headache 2000    Ongoing    Morbidly obese     Unknown start date; lost over 80 pounds between 7804-4100    Neuropathy 2019    Bilateral feet; ongoing    Ovarian cyst 2017    Bilaterally; ongoing    Pulmonary hypertension 12/01/2022    Ongoing    Severe tricuspid regurgitation 11/09/2022    Resolved 6/28/2023 per echo    Thyroid disease 2013    Ongoing (S/P left partial thyroidectomy)    Vaginal delivery 12/11/2012       Past Surgical History:   Procedure Laterality Date    INSERTION, WIRELESS SENSOR, FOR PULMONARY ARTERIAL PRESSURE  MONITORING Right 8/18/2023    Procedure: Insertion, Wireless Sensor, For Pulmonary Arterial Pressure Monitoring;  Surgeon: Maged Rolon MD;  Location: [facility] CATH LAB;  Service: Cardiology;  Laterality: Right;    LEFT HEART CATHETERIZATION Left 12/01/2022    Procedure: Left heart cath;  Surgeon: Kee Magana MD;  Location: [facility] CATH;  Service: Cardiovascular;  Laterality: Left;    RIGHT HEART CATHETERIZATION Right 12/01/2022    Procedure: INSERTION, CATHETER, RIGHT HEART;  Surgeon: Kee Magana MD;  Location: [facility] CATH;  Service: Cardiovascular;  Laterality: Right;  + [place]    RIGHT HEART CATHETERIZATION Right 02/01/2023    Procedure: INSERTION, CATHETER, RIGHT HEART;  Surgeon: Danny Clark MD;  Location: [facility] CATH LAB;  Service: Cardiology;  Laterality: Right;  with nitric oxide study    RIGHT HEART CATHETERIZATION Right 8/18/2023    Procedure: INSERTION, CATHETER, RIGHT HEART;  Surgeon: Maged Rolon MD;  Location: [facility] CATH LAB;  Service: Cardiology;  Laterality: Right;    RIGHT HEART CATHETERIZATION Right 12/12/2023    Procedure: INSERTION, CATHETER, RIGHT HEART;  Surgeon: Maged Rolon MD;  Location: [facility] CATH LAB;  Service: Cardiology;  Laterality: Right;    RIGHT HEART CATHETERIZATION Right 1/10/2025    Procedure: INSERTION, CATHETER, RIGHT HEART;  Surgeon: Maged Rolon MD;  Location: [facility] CATH LAB;  Service: Cardiology;  Laterality: Right;    ROBOT-ASSISTED LAPAROSCOPIC ABDOMINAL HYSTERECTOMY USING DA HARRY XI N/A 7/11/2024    Procedure: XI ROBOTIC HYSTERECTOMY;  Surgeon: Kris Melvin MD;  Location: [facility] OR [address] [place] [address];  Service: OB/GYN;  Laterality: N/A;    ROBOT-ASSISTED LAPAROSCOPIC OOPHORECTOMY Bilateral 7/11/2024    Procedure: ROBOTIC OOPHORECTOMY;  Surgeon: Kris Melvin MD;  [facility];  Service: OB/GYN;  Laterality: Bilateral;    SELECTIVE UNILATERAL PULMONARY ANGIOGRAPHY  8/18/2023    Procedure: Pumonary angiography - unilateral;  Surgeon: Maged Rolon  MD;  Location: [facility] CATH LAB;  Service: Cardiology;;    THYROIDECTOMY, PARTIAL Left 2013       Review of patient's allergies indicates:   Allergen Reactions    Amoxicillin Other (See Comments)     Weak, sick, jaw tightens    Penicillins      Weak, sick, jaw tightens       Current Facility-Administered Medications   Medication    [START ON 5/6/2025] aspirin EC tablet 81 mg    [START ON 5/6/2025] furosemide tablet 40 mg    [START ON 5/6/2025] levothyroxine tablet 175 mcg    [START ON 5/6/2025] macitentan-tadalafil 10-40 mg Tab 1 tablet    pregabalin capsule 100 mg    sodium chloride 0.9% flush 10 mL    [START ON 5/6/2025] spironolactone tablet 25 mg    treprostinil (REMODULIN) 6,000,000 ng in 0.9% NaCl 100 mL infusion    VELETRI/REMODULIN CASSETTE    VELETRI/REMODULIN TUBING     Family History       Problem Relation (Age of Onset)    Cancer Maternal Grandfather    No Known Problems Mother, Father    Ovarian cancer Maternal Grandmother          Tobacco Use    Smoking status: Former     Current packs/day: 0.00     Average packs/day: 1 pack/day for 5.0 years (5.0 ttl pk-yrs)     Types: Cigarettes     Start date: 2010     Quit date: 2015     Years since quitting: 10.3    Smokeless tobacco: Never   Substance and Sexual Activity    Alcohol use: Not Currently     Comment: Reports drinking wine, malt liquor, and daquiris in the past. Current alcohol use 1-2 drinks once every 3-4 months.    Drug use: Never    Sexual activity: Yes     Partners: Male     Birth control/protection: OCP     Review of Systems   Constitutional: Negative.    HENT: Negative.     Eyes: Negative.    Respiratory: Negative.     Cardiovascular: Negative.    Gastrointestinal: Negative.    Endocrine: Negative.    Genitourinary: Negative.    Musculoskeletal: Negative.    Skin: Negative.    Allergic/Immunologic: Negative.    Neurological: Negative.    Hematological: Negative.    Psychiatric/Behavioral: Negative.       Objective:     Vital Signs (Most  "Recent):  Temp: 98 °F (36.7 °C) (05/05/25 1100)  Pulse: 69 (05/05/25 1100)  Resp: 18 (05/05/25 1100)  BP: 114/63 (05/05/25 1100)  SpO2: 99 % (05/05/25 1100) Vital Signs (24h Range):  Temp:  [98 °F (36.7 °C)] 98 °F (36.7 °C)  Pulse:  [69] 69  Resp:  [18] 18  SpO2:  [99 %] 99 %  BP: (114)/(63) 114/63     Patient Vitals for the past 72 hrs (Last 3 readings):   Weight   05/05/25 1100 98.5 kg (217 lb 2.5 oz)     Body mass index is 42.41 kg/m².    No intake or output data in the 24 hours ending 05/05/25 1238       Physical Exam  Vitals and nursing note reviewed.   Constitutional:       Appearance: She is well-developed.   HENT:      Head: Normocephalic.   Eyes:      Pupils: Pupils are equal, round, and reactive to light.   Cardiovascular:      Rate and Rhythm: Normal rate and regular rhythm.   Pulmonary:      Effort: Pulmonary effort is normal.      Breath sounds: Normal breath sounds.   Abdominal:      General: Bowel sounds are normal.      Palpations: Abdomen is soft.   Musculoskeletal:         General: Normal range of motion.      Cervical back: Normal range of motion and neck supple.   Skin:     General: Skin is warm and dry.   Neurological:      Mental Status: She is alert and oriented to person, place, and time.   Psychiatric:         Behavior: Behavior normal.            Significant Labs:  CBC:  No results for input(s): "WBC", "RBC", "HGB", "HCT", "PLT", "MCV", "MCH", "MCHC" in the last 168 hours.  BNP:  No results for input(s): "BNP" in the last 168 hours.    Invalid input(s): "BNPTRIAGELBLO"  CMP:  No results for input(s): "GLU", "CALCIUM", "ALBUMIN", "PROT", "NA", "K", "CO2", "CL", "BUN", "CREATININE", "ALKPHOS", "ALT", "AST", "BILITOT" in the last 168 hours.   Coagulation:   No results for input(s): "PT", "INR", "APTT" in the last 168 hours.  LDH:  No results for input(s): "LDH" in the last 72 hours.  Microbiology:  Microbiology Results (last 7 days)       ** No results found for the last 168 hours. **      "     I have reviewed all pertinent labs within the past 24 hours.    Diagnostic Results:  I have reviewed and interpreted all pertinent imaging results/findings within the past 24 hours.    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  -Plan to transition from IV to oral treprostinil.  -Continue PO collette-tadalafil(pt has home med).  -Cont PO furosemide, georgette.         Baron Gregorio, NP  Heart Transplant  [facility] - Trans[facility] Stepdown

## 2025-05-05 NOTE — PROGRESS NOTES
Patient and  to TSU [id number] at this time. Vital signs stable and admission questions/documentation completed. Pt has home Remodulin running at 20ul/hr per remote. Plan to get midline placed and start on CAAD pump here to transition to PO Remodulin. 22 LFA placed. Telemetry on -- NSR. Pt oriented to room/staff/call light at this time. Bed remains in locked and lowest position with call light within reach.

## 2025-05-05 NOTE — HPI
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 She underwent a RHC on 1/10/25 which showed dramatic improvement in her PA pressures and PVR when compared with prior. (When compared to RHC done last year, there has been a significant improvement in her PA pressures (improved from PA=93/26 mm of Hg with PA mean=51 mm of Hg and PVR of 8 to PA= 59/23 mm of Hg, PA mean=36 mm of Hg and PVR=5.4). Given these improvements, and her low risk REVEAL score, and after discussing with the patient the decision was made to admit her to the hospital for monitored transitioning of her remodulin to orenitram (oral treprostinil). She currently endorses no complaints and understands the plan. Denies CP, palpitations, syncope, presyncope, fevers, n/v/d, PND, orthopnea. She brought her home medications as well.

## 2025-05-05 NOTE — PLAN OF CARE
Patient AAOx4, afebrile, vital signs stable. Switched to IV remodulin 30ng/kg/min @59 ml/24 hours @1522 infusing to RFA 20g extended dwell. SubQ stopped at this time. Pt also received first dose of 2mg orenitram. Tomorrow plan to decrease Remodulin to 15ngs/kg/min and 4mg orenitram @1500. Patient ambulates independently with steady gait. On telemetry-- NSR. Regular diet.  at bedside and attentive to patient. Bed in locked and lowest position with call light within reach.

## 2025-05-05 NOTE — SUBJECTIVE & OBJECTIVE
Past Medical History:   Diagnosis Date    BMI 32.0-32.9,adult 11/9/2022    Migraine headache 2000    Ongoing    Morbidly obese     Unknown start date; lost over 80 pounds between 5979-4561    Neuropathy 2019    Bilateral feet; ongoing    Ovarian cyst 2017    Bilaterally; ongoing    Pulmonary hypertension 12/01/2022    Ongoing    Severe tricuspid regurgitation 11/09/2022    Resolved 6/28/2023 per echo    Thyroid disease 2013    Ongoing (S/P left partial thyroidectomy)    Vaginal delivery 12/11/2012       Past Surgical History:   Procedure Laterality Date    INSERTION, WIRELESS SENSOR, FOR PULMONARY ARTERIAL PRESSURE MONITORING Right 8/18/2023    Procedure: Insertion, Wireless Sensor, For Pulmonary Arterial Pressure Monitoring;  Surgeon: Maged Rolon MD;  Location: [facility] CATH LAB;  Service: Cardiology;  Laterality: Right;    LEFT HEART CATHETERIZATION Left 12/01/2022    Procedure: Left heart cath;  Surgeon: Kee Magana MD;  [facility] CATH;  Service: Cardiovascular;  Laterality: Left;    RIGHT HEART CATHETERIZATION Right 12/01/2022    Procedure: INSERTION, CATHETER, RIGHT HEART;  Surgeon: Kee Magana MD;  [facility]: [facility] CATH;  Service: Cardiovascular;  Laterality: Right;  + [place]    RIGHT HEART CATHETERIZATION Right 02/01/2023    Procedure: INSERTION, CATHETER, RIGHT HEART;  Surgeon: Danny Clark MD;  Location: [facility] CATH LAB;  Service: Cardiology;  Laterality: Right;  with nitric oxide study    RIGHT HEART CATHETERIZATION Right 8/18/2023    Procedure: INSERTION, CATHETER, RIGHT HEART;  Surgeon: Maged Rolon MD;  Location: [facility] CATH LAB;  Service: Cardiology;  Laterality: Right;    RIGHT HEART CATHETERIZATION Right 12/12/2023    Procedure: INSERTION, CATHETER, RIGHT HEART;  Surgeon: Maged Rolon MD;  Location: [facility] CATH LAB;  Service: Cardiology;  Laterality: Right;    RIGHT HEART CATHETERIZATION Right 1/10/2025    Procedure: INSERTION, CATHETER, RIGHT HEART;  Surgeon: Maged Rolon  MD;  Location: [facility] CATH LAB;  Service: Cardiology;  Laterality: Right;    ROBOT-ASSISTED LAPAROSCOPIC ABDOMINAL HYSTERECTOMY USING DA HARRY XI N/A 7/11/2024    Procedure: XI ROBOTIC HYSTERECTOMY;  Surgeon: Kris Melvin MD;  [facility];  Service: OB/GYN;  Laterality: N/A;    ROBOT-ASSISTED LAPAROSCOPIC OOPHORECTOMY Bilateral 7/11/2024    Procedure: ROBOTIC OOPHORECTOMY;  Surgeon: Kris Melvin MD;  [facility];  Service: OB/GYN;  Laterality: Bilateral;    SELECTIVE UNILATERAL PULMONARY ANGIOGRAPHY  8/18/2023    Procedure: Pumonary angiography - unilateral;  Surgeon: Maged Rolon MD;  Location: [facility] CATH LAB;  Service: Cardiology;;    THYROIDECTOMY, PARTIAL Left 2013       Review of patient's allergies indicates:   Allergen Reactions    Amoxicillin Other (See Comments)     Weak, sick, jaw tightens    Penicillins      Weak, sick, jaw tightens       Current Facility-Administered Medications   Medication    [START ON 5/6/2025] aspirin EC tablet 81 mg    [START ON 5/6/2025] furosemide tablet 40 mg    [START ON 5/6/2025] levothyroxine tablet 175 mcg    [START ON 5/6/2025] macitentan-tadalafil 10-40 mg Tab 1 tablet    pregabalin capsule 100 mg    sodium chloride 0.9% flush 10 mL    [START ON 5/6/2025] spironolactone tablet 25 mg    treprostinil (REMODULIN) 6,000,000 ng in 0.9% NaCl 100 mL infusion    VELETRI/REMODULIN CASSETTE    VELETRI/REMODULIN TUBING     Family History       Problem Relation (Age of Onset)    Cancer Maternal Grandfather    No Known Problems Mother, Father    Ovarian cancer Maternal Grandmother          Tobacco Use    Smoking status: Former     Current packs/day: 0.00     Average packs/day: 1 pack/day for 5.0 years (5.0 ttl pk-yrs)     Types: Cigarettes     Start date: 2010     Quit date: 2015     Years since quitting: 10.3    Smokeless tobacco: Never   Substance and Sexual Activity    Alcohol use: Not Currently     Comment: Reports drinking wine, malt liquor, and  daquiris in the past. Current alcohol use 1-2 drinks once every 3-4 months.    Drug use: Never    Sexual activity: Yes     Partners: Male     Birth control/protection: OCP     Review of Systems   Constitutional: Negative.    HENT: Negative.     Eyes: Negative.    Respiratory: Negative.     Cardiovascular: Negative.    Gastrointestinal: Negative.    Endocrine: Negative.    Genitourinary: Negative.    Musculoskeletal: Negative.    Skin: Negative.    Allergic/Immunologic: Negative.    Neurological: Negative.    Hematological: Negative.    Psychiatric/Behavioral: Negative.       Objective:     Vital Signs (Most Recent):  Temp: 98 °F (36.7 °C) (05/05/25 1100)  Pulse: 69 (05/05/25 1100)  Resp: 18 (05/05/25 1100)  BP: 114/63 (05/05/25 1100)  SpO2: 99 % (05/05/25 1100) Vital Signs (24h Range):  Temp:  [98 °F (36.7 °C)] 98 °F (36.7 °C)  Pulse:  [69] 69  Resp:  [18] 18  SpO2:  [99 %] 99 %  BP: (114)/(63) 114/63     Patient Vitals for the past 72 hrs (Last 3 readings):   Weight   05/05/25 1100 98.5 kg (217 lb 2.5 oz)     Body mass index is 42.41 kg/m².    No intake or output data in the 24 hours ending 05/05/25 1238       Physical Exam  Vitals and nursing note reviewed.   Constitutional:       Appearance: She is well-developed.   HENT:      Head: Normocephalic.   Eyes:      Pupils: Pupils are equal, round, and reactive to light.   Cardiovascular:      Rate and Rhythm: Normal rate and regular rhythm.   Pulmonary:      Effort: Pulmonary effort is normal.      Breath sounds: Normal breath sounds.   Abdominal:      General: Bowel sounds are normal.      Palpations: Abdomen is soft.   Musculoskeletal:         General: Normal range of motion.      Cervical back: Normal range of motion and neck supple.   Skin:     General: Skin is warm and dry.   Neurological:      Mental Status: She is alert and oriented to person, place, and time.   Psychiatric:         Behavior: Behavior normal.            Significant Labs:  CBC:  No results for  "input(s): "WBC", "RBC", "HGB", "HCT", "PLT", "MCV", "MCH", "MCHC" in the last 168 hours.  BNP:  No results for input(s): "BNP" in the last 168 hours.    Invalid input(s): "BNPTRIAGELBLO"  CMP:  No results for input(s): "GLU", "CALCIUM", "ALBUMIN", "PROT", "NA", "K", "CO2", "CL", "BUN", "CREATININE", "ALKPHOS", "ALT", "AST", "BILITOT" in the last 168 hours.   Coagulation:   No results for input(s): "PT", "INR", "APTT" in the last 168 hours.  LDH:  No results for input(s): "LDH" in the last 72 hours.  Microbiology:  Microbiology Results (last 7 days)       ** No results found for the last 168 hours. **          I have reviewed all pertinent labs within the past 24 hours.    Diagnostic Results:  I have reviewed and interpreted all pertinent imaging results/findings within the past 24 hours.    "

## 2025-05-05 NOTE — CONSULTS
[facility]       Bed:73754/56990 A    20G x 2.5IN PIV placed in Right Forearm by MARSHA using Ultrasound Guidance.    Indication: PVA  Attempts: 1    Rachel Villegas RN

## 2025-05-05 NOTE — ASSESSMENT & PLAN NOTE
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  -Plan to transition from IV to oral treprostinil.  -Continue PO collette-tadalafil(pt has home med).  -Cont PO furosemide, georgette.

## 2025-05-05 NOTE — PROGRESS NOTES
Home Parenteral Prostacyclin Continuation: Pharmacist Verification Note  Home medication variables  Specialty Pharmacy Contacted: [facility]: [phone]  Date of latest prescription (mm/dd/yyyy): 6/18/24   Type of ambulatory infusion pump: Remunity SQ   Ambulatory pump rate; 20uL/hr    Drug: Treprostinil (Remodulin)  Route: Subcutaneous  Dosing weight (kg): 82kg   How supplied: Patient Filled   Verified current dose: 40.65 ng/kg/min  Patient is titrating therapy (yes or no) no (weaning to convert to oral orenitram switching)    Hospital medication variables  Drug : Treprostinil (Remodulin)  Route: Intravenous  Dosing weight (kg): 82 kg        Hospital concentration (mg/mL or ng/mL):                6,000,000ng/100mL         Dose to be administered in hospital (ng/kg/min): 40ng/kg/min         Hospital infusion initiation time (stat vs standard administration time): stat  Contacted \[facility]\"" attending: (yes/no): no, MD aware of plan  Pharmacist: Marissa Villafuerte, Pharm.D.  Extension: [phone]

## 2025-05-05 NOTE — TREATMENT PLAN
Patient admitting on SQ Remodulin at 40.65ngs/kg/min   DW 82kg  Via Remunity pump , rate = 80ul/hr    Plan is to transition to 40ngs/kg/min of IV Remodulin via SINGLE LUMEN midline or PICC--TBD by PICC team)---SPOKE TO [facility] ON PICC TEAM THEY WILL LOOK IT OVER SHORTLY AND DECIDE ON WHICH ACCESS AND PLACE LINE    Patient will then start IV Remodulin to PO Orenitram transition ~3pm today if all orders can be placed in timely manner    See below for predetermined transition plan by [facility]    Admit on 40ngs/kg/min, CADD rate 79mls/24hrs    Day 1 transition   ~3pm decrease Remodulin IV to 30ngs/kg/min, CADD rate 59mls/24hrs and start PO Orenitram at 2mg TID    Day 2 transition   ~3pm decrease Remodulin to 15ngs/kg/min, CADD rate 30mls/24hrs and increase PO Orenitram to 4mgs TID    Day 3 transition   ~3pm turn OFF Remodulin and increase PO Orenitram to 6mgs TID

## 2025-05-06 LAB
ABSOLUTE EOSINOPHIL (OHS): 0.86 K/UL
ABSOLUTE MONOCYTE (OHS): 0.52 K/UL (ref 0.3–1)
ABSOLUTE NEUTROPHIL COUNT (OHS): 7.35 K/UL (ref 1.8–7.7)
ALBUMIN SERPL BCP-MCNC: 3.8 G/DL (ref 3.5–5.2)
ALP SERPL-CCNC: 162 UNIT/L (ref 40–150)
ALT SERPL W/O P-5'-P-CCNC: 27 UNIT/L (ref 10–44)
ANION GAP (OHS): 12 MMOL/L (ref 8–16)
AST SERPL-CCNC: 21 UNIT/L (ref 11–45)
BASOPHILS # BLD AUTO: 0.06 K/UL
BASOPHILS NFR BLD AUTO: 0.6 %
BILIRUB SERPL-MCNC: 0.4 MG/DL (ref 0.1–1)
BUN SERPL-MCNC: 16 MG/DL (ref 6–20)
CALCIUM SERPL-MCNC: 9.1 MG/DL (ref 8.7–10.5)
CHLORIDE SERPL-SCNC: 102 MMOL/L (ref 95–110)
CO2 SERPL-SCNC: 24 MMOL/L (ref 23–29)
CREAT SERPL-MCNC: 0.8 MG/DL (ref 0.5–1.4)
ERYTHROCYTE [DISTWIDTH] IN BLOOD BY AUTOMATED COUNT: 14.2 % (ref 11.5–14.5)
GFR SERPLBLD CREATININE-BSD FMLA CKD-EPI: >60 ML/MIN/1.73/M2
GLUCOSE SERPL-MCNC: 116 MG/DL (ref 70–110)
HCT VFR BLD AUTO: 46.2 % (ref 37–48.5)
HGB BLD-MCNC: 15 GM/DL (ref 12–16)
IMM GRANULOCYTES # BLD AUTO: 0.05 K/UL (ref 0–0.04)
IMM GRANULOCYTES NFR BLD AUTO: 0.5 % (ref 0–0.5)
LYMPHOCYTES # BLD AUTO: 1.58 K/UL (ref 1–4.8)
MCH RBC QN AUTO: 27.7 PG (ref 27–31)
MCHC RBC AUTO-ENTMCNC: 32.5 G/DL (ref 32–36)
MCV RBC AUTO: 85 FL (ref 82–98)
NUCLEATED RBC (/100WBC) (OHS): 0 /100 WBC
PLATELET # BLD AUTO: 258 K/UL (ref 150–450)
PMV BLD AUTO: 11 FL (ref 9.2–12.9)
POTASSIUM SERPL-SCNC: 3.8 MMOL/L (ref 3.5–5.1)
PROT SERPL-MCNC: 7.5 GM/DL (ref 6–8.4)
RBC # BLD AUTO: 5.41 M/UL (ref 4–5.4)
RELATIVE EOSINOPHIL (OHS): 8.3 %
RELATIVE LYMPHOCYTE (OHS): 15.2 % (ref 18–48)
RELATIVE MONOCYTE (OHS): 5 % (ref 4–15)
RELATIVE NEUTROPHIL (OHS): 70.4 % (ref 38–73)
SODIUM SERPL-SCNC: 138 MMOL/L (ref 136–145)
WBC # BLD AUTO: 10.42 K/UL (ref 3.9–12.7)

## 2025-05-06 PROCEDURE — 20600001 HC STEP DOWN PRIVATE ROOM

## 2025-05-06 PROCEDURE — 82040 ASSAY OF SERUM ALBUMIN: CPT | Performed by: PHYSICIAN ASSISTANT

## 2025-05-06 PROCEDURE — 63600175 PHARM REV CODE 636 W HCPCS: Mod: TB | Performed by: INTERNAL MEDICINE

## 2025-05-06 PROCEDURE — 36415 COLL VENOUS BLD VENIPUNCTURE: CPT | Performed by: PHYSICIAN ASSISTANT

## 2025-05-06 PROCEDURE — 99233 SBSQ HOSP IP/OBS HIGH 50: CPT | Mod: ,,, | Performed by: PHYSICIAN ASSISTANT

## 2025-05-06 PROCEDURE — 25000003 PHARM REV CODE 250: Performed by: NURSE PRACTITIONER

## 2025-05-06 PROCEDURE — 25000003 PHARM REV CODE 250: Performed by: INTERNAL MEDICINE

## 2025-05-06 PROCEDURE — 85025 COMPLETE CBC W/AUTO DIFF WBC: CPT | Performed by: PHYSICIAN ASSISTANT

## 2025-05-06 RX ADMIN — FUROSEMIDE 40 MG: 40 TABLET ORAL at 08:05

## 2025-05-06 RX ADMIN — SPIRONOLACTONE 25 MG: 25 TABLET ORAL at 08:05

## 2025-05-06 RX ADMIN — TREPROSTINIL 4 MG: 1 TABLET, EXTENDED RELEASE ORAL at 08:05

## 2025-05-06 RX ADMIN — LEVOTHYROXINE SODIUM 175 MCG: 0.12 TABLET ORAL at 05:05

## 2025-05-06 RX ADMIN — ASPIRIN 81 MG: 81 TABLET, COATED ORAL at 08:05

## 2025-05-06 RX ADMIN — TREPROSTINIL 4 MG: 1 TABLET, EXTENDED RELEASE ORAL at 03:05

## 2025-05-06 RX ADMIN — TREPROSTINIL 2 MG: 1 TABLET, EXTENDED RELEASE ORAL at 08:05

## 2025-05-06 RX ADMIN — PREGABALIN 100 MG: 50 CAPSULE ORAL at 08:05

## 2025-05-06 RX ADMIN — TREPROSTINIL 15 NG/KG/MIN: 200 INJECTION, SOLUTION INTRAVENOUS; SUBCUTANEOUS at 03:05

## 2025-05-06 NOTE — ASSESSMENT & PLAN NOTE
-RV failure with Pulm HTN  -Last echo 6/18/24 EF: 55-60%, LVEDD: 4.39, Mild RVE with function mildly reduced

## 2025-05-06 NOTE — PLAN OF CARE
- Patient admitted 5/5/25 for switch from subcutaneous Remodulin to PO Orenitram.  - Remodulin infusing to peripheral IV - Dose 30 ng/kg/min; dosing weight 82 kg; CADD rate 59 mL/24 hr.  - Orenitram 2 mg administered. Patient took med with a snack.  - Today at 3 pm, Remodulin will be decreased to 15 ng/kg/min.  - VS stable overnight; no issues with medication change.  - Urine output= 325 mL.  -  at bedside.

## 2025-05-06 NOTE — SUBJECTIVE & OBJECTIVE
Interval History: Patient admitted for transition from Remodulin to po Orenitram.  Orders in for 3 day transition.  She seems to be tolerating it well.  Anticipating discharge Wednesday afternoon.     Continuous Infusions:   treprostinil (REMODULIN) 6,000,000 ng in 0.9% NaCl 100 mL infusion  30 ng/kg/min (Order-Specific) Intravenous Continuous        Followed by    treprostinil (REMODULIN) 6,000,000 ng in 0.9% NaCl 100 mL infusion  15 ng/kg/min (Order-Specific) Intravenous Continuous        veletri/remodulin cassette   Intravenous Continuous        veletri/remodulin tubing   Intravenous Continuous         Scheduled Meds:   aspirin  81 mg Oral Daily    furosemide  40 mg Oral Daily    levothyroxine  175 mcg Oral QAM    macitentan-tadalafil  1 tablet Oral Daily    pregabalin  100 mg Oral QHS    spironolactone  25 mg Oral Daily    treprostinil diolamine  4 mg Oral TID    Followed by    [START ON 5/7/2025] treprostinil diolamine  6 mg Oral TID     PRN Meds:  Current Facility-Administered Medications:     sodium chloride 0.9%, 10 mL, Intravenous, PRN    Review of patient's allergies indicates:   Allergen Reactions    Amoxicillin Other (See Comments)     Weak, sick, jaw tightens    Penicillins      Weak, sick, jaw tightens     Objective:     Vital Signs (Most Recent):  Temp: 97.7 °F (36.5 °C) (05/06/25 0815)  Pulse: 63 (05/06/25 0815)  Resp: 19 (05/06/25 0815)  BP: 124/68 (05/06/25 0815)  SpO2: 98 % (05/06/25 0815) Vital Signs (24h Range):  Temp:  [97.6 °F (36.4 °C)-98.5 °F (36.9 °C)] 97.7 °F (36.5 °C)  Pulse:  [60-77] 63  Resp:  [18-19] 19  SpO2:  [94 %-99 %] 98 %  BP: (101-124)/(58-68) 124/68     Patient Vitals for the past 72 hrs (Last 3 readings):   Weight   05/06/25 0530 99.1 kg (218 lb 7.6 oz)   05/05/25 1100 98.5 kg (217 lb 2.5 oz)     Body mass index is 42.67 kg/m².      Intake/Output Summary (Last 24 hours) at 5/6/2025 1059  Last data filed at 5/6/2025 0530  Gross per 24 hour   Intake 950 ml   Output 325 ml   Net  "625 ml       Hemodynamic Parameters:       Telemetry: reviewed     Physical Exam  Vitals and nursing note reviewed.   Constitutional:       Appearance: Normal appearance.   HENT:      Head: Normocephalic.      Nose: Nose normal.      Mouth/Throat:      Mouth: Mucous membranes are moist.   Eyes:      Extraocular Movements: Extraocular movements intact.      Pupils: Pupils are equal, round, and reactive to light.   Cardiovascular:      Rate and Rhythm: Normal rate and regular rhythm.      Heart sounds: Murmur heard.   Pulmonary:      Effort: Pulmonary effort is normal.   Abdominal:      General: Bowel sounds are normal. There is no distension.      Palpations: Abdomen is soft.   Musculoskeletal:      Cervical back: Normal range of motion.      Right lower leg: No edema.      Left lower leg: No edema.   Skin:     General: Skin is warm.      Capillary Refill: Capillary refill takes 2 to 3 seconds.   Neurological:      General: No focal deficit present.      Mental Status: She is alert and oriented to person, place, and time.   Psychiatric:         Mood and Affect: Mood normal.         Behavior: Behavior normal.            Significant Labs:  CBC:  Recent Labs   Lab 05/05/25  1142 05/06/25  0841   WBC 10.83 10.42   RBC 5.72* 5.41*   HGB 15.9 15.0   HCT 47.4 46.2    258   MCV 83 85   MCH 27.8 27.7   MCHC 33.5 32.5     BNP:  No results for input(s): "BNP" in the last 168 hours.    Invalid input(s): "BNPTRIAGELBLO"  CMP:  Recent Labs   Lab 05/05/25  1142 05/06/25  0841   GLU 89 116*   CALCIUM 8.4* 9.1   ALBUMIN 3.9 3.8   PROT 7.8 7.5    138   K 3.5 3.8   CO2 21* 24    102   BUN 13 16   CREATININE 0.7 0.8   ALKPHOS 173* 162*   ALT 28 27   AST 23 21   BILITOT 0.5 0.4      Coagulation:   No results for input(s): "PT", "INR", "APTT" in the last 168 hours.  LDH:  No results for input(s): "LDH" in the last 72 hours.  Microbiology:  Microbiology Results (last 7 days)       ** No results found for the last 168 " hours. **            I have reviewed all pertinent labs within the past 24 hours.    Estimated Creatinine Clearance: 104.6 mL/min (based on SCr of 0.8 mg/dL).    Diagnostic Results:  I have reviewed all pertinent imaging results/findings within the past 24 hours.

## 2025-05-06 NOTE — ASSESSMENT & PLAN NOTE
-33 YO F w/ severe WHO group 1 PH, obesity and thyroid disease, on subcutaenous treprostinil and combination macitentan-tadalafil, and was recently started on sotatercept for her severe PH.  -RHC in January 1/10/25 RA: 4, PAP: 59/23 (36), PWP: 8, CO: 4.7, CI: 2.5 improvement in PA pressures  -Plan to transition from IV to oral treprostinil.  -Continue PO collette-tadalafil(pt has home med).  -Cont home dose of PO furosemide, georgette.

## 2025-05-06 NOTE — PROGRESS NOTES
[facility] - Transplant Stepdown  Heart Transplant  Progress Note    Patient Name: Mya Sarmiento  MRN: [id number]  Admission Date: 5/5/2025  Hospital Length of Stay: 1 days  Attending Physician: Vito Woodward MD  Primary Care Provider: Carlota Primary Doctor  Principal Problem:Pulmonary hypertension    Subjective:   Interval History: Patient admitted for transition from Remodulin to po Orenitram.  Orders in for 3 day transition.  She seems to be tolerating it well.  Anticipating discharge Wednesday afternoon.     Continuous Infusions:   treprostinil (REMODULIN) 6,000,000 ng in 0.9% NaCl 100 mL infusion  30 ng/kg/min (Order-Specific) Intravenous Continuous        Followed by    treprostinil (REMODULIN) 6,000,000 ng in 0.9% NaCl 100 mL infusion  15 ng/kg/min (Order-Specific) Intravenous Continuous        veletri/remodulin cassette   Intravenous Continuous        veletri/remodulin tubing   Intravenous Continuous         Scheduled Meds:   aspirin  81 mg Oral Daily    furosemide  40 mg Oral Daily    levothyroxine  175 mcg Oral QAM    macitentan-tadalafil  1 tablet Oral Daily    pregabalin  100 mg Oral QHS    spironolactone  25 mg Oral Daily    treprostinil diolamine  4 mg Oral TID    Followed by    [START ON 5/7/2025] treprostinil diolamine  6 mg Oral TID     PRN Meds:  Current Facility-Administered Medications:     sodium chloride 0.9%, 10 mL, Intravenous, PRN    Review of patient's allergies indicates:   Allergen Reactions    Amoxicillin Other (See Comments)     Weak, sick, jaw tightens    Penicillins      Weak, sick, jaw tightens     Objective:     Vital Signs (Most Recent):  Temp: 97.7 °F (36.5 °C) (05/06/25 0815)  Pulse: 63 (05/06/25 0815)  Resp: 19 (05/06/25 0815)  BP: 124/68 (05/06/25 0815)  SpO2: 98 % (05/06/25 0815) Vital Signs (24h Range):  Temp:  [97.6 °F (36.4 °C)-98.5 °F (36.9 °C)] 97.7 °F (36.5 °C)  Pulse:  [60-77] 63  Resp:  [18-19] 19  SpO2:  [94 %-99 %] 98 %  BP: (101-124)/(58-68) 124/68     Patient  "Vitals for the past 72 hrs (Last 3 readings):   Weight   05/06/25 0530 99.1 kg (218 lb 7.6 oz)   05/05/25 1100 98.5 kg (217 lb 2.5 oz)     Body mass index is 42.67 kg/m².      Intake/Output Summary (Last 24 hours) at 5/6/2025 1059  Last data filed at 5/6/2025 0530  Gross per 24 hour   Intake 950 ml   Output 325 ml   Net 625 ml       Hemodynamic Parameters:       Telemetry: reviewed     Physical Exam  Vitals and nursing note reviewed.   Constitutional:       Appearance: Normal appearance.   HENT:      Head: Normocephalic.      Nose: Nose normal.      Mouth/Throat:      Mouth: Mucous membranes are moist.   Eyes:      Extraocular Movements: Extraocular movements intact.      Pupils: Pupils are equal, round, and reactive to light.   Cardiovascular:      Rate and Rhythm: Normal rate and regular rhythm.      Heart sounds: Murmur heard.   Pulmonary:      Effort: Pulmonary effort is normal.   Abdominal:      General: Bowel sounds are normal. There is no distension.      Palpations: Abdomen is soft.   Musculoskeletal:      Cervical back: Normal range of motion.      Right lower leg: No edema.      Left lower leg: No edema.   Skin:     General: Skin is warm.      Capillary Refill: Capillary refill takes 2 to 3 seconds.   Neurological:      General: No focal deficit present.      Mental Status: She is alert and oriented to person, place, and time.   Psychiatric:         Mood and Affect: Mood normal.         Behavior: Behavior normal.            Significant Labs:  CBC:  Recent Labs   Lab 05/05/25  1142 05/06/25  0841   WBC 10.83 10.42   RBC 5.72* 5.41*   HGB 15.9 15.0   HCT 47.4 46.2    258   MCV 83 85   MCH 27.8 27.7   MCHC 33.5 32.5     BNP:  No results for input(s): "BNP" in the last 168 hours.    Invalid input(s): "BNPTRIAGELBLO"  CMP:  Recent Labs   Lab 05/05/25  1142 05/06/25  0841   GLU 89 116*   CALCIUM 8.4* 9.1   ALBUMIN 3.9 3.8   PROT 7.8 7.5    138   K 3.5 3.8   CO2 21* 24    102   BUN 13 16 " "  CREATININE 0.7 0.8   ALKPHOS 173* 162*   ALT 28 27   AST 23 21   BILITOT 0.5 0.4      Coagulation:   No results for input(s): "PT", "INR", "APTT" in the last 168 hours.  LDH:  No results for input(s): "LDH" in the last 72 hours.  Microbiology:  Microbiology Results (last 7 days)       ** No results found for the last 168 hours. **            I have reviewed all pertinent labs within the past 24 hours.    Estimated Creatinine Clearance: 104.6 mL/min (based on SCr of 0.8 mg/dL).    Diagnostic Results:  I have reviewed all pertinent imaging results/findings within the past 24 hours.  Assessment and Plan:      33 YO F w/ severe WHO group 1 PH, obesity and thyroid disease, on subcutaenous treprostinil and combination macitentan-tadalafil, and was recently started on sotatercept for her severe PH who is admitted to transition from IV to oral treprostinil. She underwent a RHC on 1/10/25 which showed dramatic improvement in her PA pressures and PVR when compared with prior. (When compared to RHC done last year, there has been a significant improvement in her PA pressures (improved from PA=93/26 mm of Hg with PA mean=51 mm of Hg and PVR of 8 to PA= 59/23 mm of Hg, PA mean=36 mm of Hg and PVR=5.4). Given these improvements, and her low risk REVEAL score, and after discussing with the patient the decision was made to admit her to the hospital for monitored transitioning of her remodulin to orenitram (oral treprostinil). She currently endorses no complaints and understands the plan. Denies CP, palpitations, syncope, presyncope, fevers, n/v/d, PND, orthopnea. She brought her home medications as well.       * Pulmonary hypertension  -33 YO F w/ severe WHO group 1 PH, obesity and thyroid disease, on subcutaenous treprostinil and combination macitentan-tadalafil, and was recently started on sotatercept for her severe PH.  -RHC in January 1/10/25 RA: 4, PAP: 59/23 (36), PWP: 8, CO: 4.7, CI: 2.5 improvement in PA pressures  -Plan " to transition from IV to oral treprostinil.  -Continue PO collette-tadalafil(pt has home med).  -Cont home dose of PO furosemide, georgette.       WHO group 1 pulmonary arterial hypertension  -See above Pulmonary HTN     Chronic pulmonary heart disease  -RV failure with Pulm HTN  -Last echo 6/18/24 EF: 55-60%, LVEDD: 4.39, Mild RVE with function mildly reduced     BMI 38.0-38.9,adult  -Body mass index is 42.67 kg/m².          NAYELI Wetzel  Heart Transplant  [facility] - Transplant Stepdown

## 2025-05-06 NOTE — PLAN OF CARE
Patient AAOx4, afebrile, vital signs stable. IV Remodulin 15ng/kg/min @ 30ml/24hr infusing to RFA 20g extended dwell. DW 82 kg. Received dose of 4mg Orenitram. Tomorrow (5/7) plan to stop Remodulin and increase Orenitram to 6mg  @1500. Patient ambulates independently with steady gait. On telemetry-- NSR. Regular diet.  at bedside and attentive to patient. Bed in locked and lowest position with call light within reach.

## 2025-05-07 LAB
ABSOLUTE EOSINOPHIL (OHS): 0.76 K/UL
ABSOLUTE MONOCYTE (OHS): 0.53 K/UL (ref 0.3–1)
ABSOLUTE NEUTROPHIL COUNT (OHS): 7.4 K/UL (ref 1.8–7.7)
ALBUMIN SERPL BCP-MCNC: 3.4 G/DL (ref 3.5–5.2)
ALP SERPL-CCNC: 152 UNIT/L (ref 40–150)
ALT SERPL W/O P-5'-P-CCNC: 25 UNIT/L (ref 10–44)
ANION GAP (OHS): 9 MMOL/L (ref 8–16)
AST SERPL-CCNC: 20 UNIT/L (ref 11–45)
BASOPHILS # BLD AUTO: 0.07 K/UL
BASOPHILS NFR BLD AUTO: 0.7 %
BILIRUB SERPL-MCNC: 0.4 MG/DL (ref 0.1–1)
BUN SERPL-MCNC: 17 MG/DL (ref 6–20)
CALCIUM SERPL-MCNC: 8.8 MG/DL (ref 8.7–10.5)
CHLORIDE SERPL-SCNC: 105 MMOL/L (ref 95–110)
CO2 SERPL-SCNC: 24 MMOL/L (ref 23–29)
CREAT SERPL-MCNC: 0.7 MG/DL (ref 0.5–1.4)
ERYTHROCYTE [DISTWIDTH] IN BLOOD BY AUTOMATED COUNT: 14 % (ref 11.5–14.5)
GFR SERPLBLD CREATININE-BSD FMLA CKD-EPI: >60 ML/MIN/1.73/M2
GLUCOSE SERPL-MCNC: 91 MG/DL (ref 70–110)
HCT VFR BLD AUTO: 43.4 % (ref 37–48.5)
HGB BLD-MCNC: 14.2 GM/DL (ref 12–16)
IMM GRANULOCYTES # BLD AUTO: 0.05 K/UL (ref 0–0.04)
IMM GRANULOCYTES NFR BLD AUTO: 0.5 % (ref 0–0.5)
LYMPHOCYTES # BLD AUTO: 1.51 K/UL (ref 1–4.8)
MCH RBC QN AUTO: 27.8 PG (ref 27–31)
MCHC RBC AUTO-ENTMCNC: 32.7 G/DL (ref 32–36)
MCV RBC AUTO: 85 FL (ref 82–98)
NUCLEATED RBC (/100WBC) (OHS): 0 /100 WBC
PLATELET # BLD AUTO: 234 K/UL (ref 150–450)
PMV BLD AUTO: 11.2 FL (ref 9.2–12.9)
POTASSIUM SERPL-SCNC: 3.5 MMOL/L (ref 3.5–5.1)
PROT SERPL-MCNC: 6.9 GM/DL (ref 6–8.4)
RBC # BLD AUTO: 5.1 M/UL (ref 4–5.4)
RELATIVE EOSINOPHIL (OHS): 7.4 %
RELATIVE LYMPHOCYTE (OHS): 14.6 % (ref 18–48)
RELATIVE MONOCYTE (OHS): 5.1 % (ref 4–15)
RELATIVE NEUTROPHIL (OHS): 71.7 % (ref 38–73)
SODIUM SERPL-SCNC: 138 MMOL/L (ref 136–145)
WBC # BLD AUTO: 10.32 K/UL (ref 3.9–12.7)

## 2025-05-07 PROCEDURE — 36415 COLL VENOUS BLD VENIPUNCTURE: CPT | Performed by: PHYSICIAN ASSISTANT

## 2025-05-07 PROCEDURE — 85025 COMPLETE CBC W/AUTO DIFF WBC: CPT | Performed by: PHYSICIAN ASSISTANT

## 2025-05-07 PROCEDURE — 99233 SBSQ HOSP IP/OBS HIGH 50: CPT | Mod: ,,, | Performed by: PHYSICIAN ASSISTANT

## 2025-05-07 PROCEDURE — 80053 COMPREHEN METABOLIC PANEL: CPT | Performed by: PHYSICIAN ASSISTANT

## 2025-05-07 PROCEDURE — 20600001 HC STEP DOWN PRIVATE ROOM

## 2025-05-07 PROCEDURE — 25000003 PHARM REV CODE 250: Performed by: INTERNAL MEDICINE

## 2025-05-07 PROCEDURE — 25000003 PHARM REV CODE 250: Performed by: NURSE PRACTITIONER

## 2025-05-07 RX ADMIN — TREPROSTINIL 6 MG: 1 TABLET, EXTENDED RELEASE ORAL at 02:05

## 2025-05-07 RX ADMIN — SPIRONOLACTONE 25 MG: 25 TABLET ORAL at 08:05

## 2025-05-07 RX ADMIN — TREPROSTINIL 6 MG: 1 TABLET, EXTENDED RELEASE ORAL at 08:05

## 2025-05-07 RX ADMIN — PREGABALIN 100 MG: 50 CAPSULE ORAL at 08:05

## 2025-05-07 RX ADMIN — LEVOTHYROXINE SODIUM 175 MCG: 0.12 TABLET ORAL at 06:05

## 2025-05-07 RX ADMIN — ASPIRIN 81 MG: 81 TABLET, COATED ORAL at 08:05

## 2025-05-07 RX ADMIN — TREPROSTINIL 4 MG: 1 TABLET, EXTENDED RELEASE ORAL at 08:05

## 2025-05-07 RX ADMIN — FUROSEMIDE 40 MG: 40 TABLET ORAL at 08:05

## 2025-05-07 NOTE — HOSPITAL COURSE
Patient admitted for transition from Remodulin to po Orenitram.  She completed a 3 day transition to Orenitram without complications or symptoms.  Patient reports she feels well and is ready for discharge.  Patient discharged to home in stable condition on po Orenitram.  Will plan for repeat echo in 3 months and repeat RHC in 6 months.

## 2025-05-07 NOTE — PLAN OF CARE
- Patient admitted 5/5/25 for switch from subcutaneous Remodulin to PO Orenitram.  - Remodulin infusing to peripheral IV - Dose 15 ng/kg/min; dosing weight 82 kg; CADD rate 30 mL/24 hr.  - Orenitram 4 mg administered. Patient took med with a snack.  - Today at 3 pm, Remodulin will turned off and patient's Orenitram dose will increase to 6 mg.  - VS stable overnight; no issues with medication change.  - Urine output= 525 mL.

## 2025-05-07 NOTE — ASSESSMENT & PLAN NOTE
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  -RHC in January 1/10/25 RA: 4, PAP: 59/23 (36), PWP: 8, CO: 4.7, CI: 2.5 improvement in PA pressures  -Plan to transition from IV to oral treprostinil.  -Continue PO collette-tadalafil(pt has home med).  -Cont home dose of PO furosemide, georgette.   -On discharge; will get repeat echo in 3 months and repeat RHC in 6 months

## 2025-05-07 NOTE — PLAN OF CARE
AAOx4. VSS. Patient up OOB in room, bathroom, and chair independently. PO lasix 40mg and PO spironolactone 25mg continued daily. Patient voiding clear yellow urine per hat in toilet - see flowsheet for exact UOP amount. BM x1 this shift. IV Remodulin discontinued and stopped at 1500 per orders. RFA 20G PIV aspirated and removed after stopping remodulin. Patient transitioned to PO Orenitram 6mg TID. Bed in low position. Call light within reach. Plan for patient to discharge tomorrow 5/8.

## 2025-05-07 NOTE — SUBJECTIVE & OBJECTIVE
- Not controlled at this time   - Ordered Flonase 1 SEN daily   - Ordered Astelin 1 SEN BID   - Educated on proper use of intranasal spray  - Will continue to monitor and reassess    Interval History: Patient admitted for transition from Remodulin to po Orenitram.  Orders in for 3 day transition.  She is tolerating it well with no symptoms.  Remodulin will be off by 3pm today. Given that half life is 6 hours; will monitor her overnight and discharge tomorrow. Plan for repeat echo in 3 months and repeat RHC in 6 months.     Continuous Infusions:   treprostinil (REMODULIN) 6,000,000 ng in 0.9% NaCl 100 mL infusion  15 ng/kg/min (Order-Specific) Intravenous Continuous 1.23 mL/hr at 05/07/25 0700 15 ng/kg/min at 05/07/25 0700    veletri/remodulin cassette   Intravenous Continuous        veletri/remodulin tubing   Intravenous Continuous         Scheduled Meds:   aspirin  81 mg Oral Daily    furosemide  40 mg Oral Daily    levothyroxine  175 mcg Oral QAM    macitentan-tadalafil  1 tablet Oral Daily    pregabalin  100 mg Oral QHS    spironolactone  25 mg Oral Daily    treprostinil diolamine  6 mg Oral TID     PRN Meds:  Current Facility-Administered Medications:     sodium chloride 0.9%, 10 mL, Intravenous, PRN    Review of patient's allergies indicates:   Allergen Reactions    Amoxicillin Other (See Comments)     Weak, sick, jaw tightens    Penicillins      Weak, sick, jaw tightens     Objective:     Vital Signs (Most Recent):  Temp: 98 °F (36.7 °C) (05/07/25 0740)  Pulse: 67 (05/07/25 0740)  Resp: 18 (05/07/25 0740)  BP: (!) 108/58 (05/07/25 0740)  SpO2: 95 % (05/07/25 0740) Vital Signs (24h Range):  Temp:  [97.5 °F (36.4 °C)-98.1 °F (36.7 °C)] 98 °F (36.7 °C)  Pulse:  [58-80] 67  Resp:  [18] 18  SpO2:  [93 %-97 %] 95 %  BP: (104-115)/(56-76) 108/58     Patient Vitals for the past 72 hrs (Last 3 readings):   Weight   05/07/25 0600 100.2 kg (220 lb 14.4 oz)   05/06/25 0530 99.1 kg (218 lb 7.6 oz)   05/05/25 1100 98.5 kg (217 lb 2.5 oz)     Body mass index is 43.14 kg/m².      Intake/Output Summary (Last 24 hours) at 5/7/2025 1007  Last data filed at 5/7/2025 0946  Gross per 24 hour   Intake 1447 ml  "  Output 2325 ml   Net -878 ml       Hemodynamic Parameters:       Telemetry: reviewed     Physical Exam  Vitals and nursing note reviewed.   Constitutional:       Appearance: Normal appearance.   HENT:      Head: Normocephalic.      Nose: Nose normal.      Mouth/Throat:      Mouth: Mucous membranes are moist.   Eyes:      Extraocular Movements: Extraocular movements intact.      Pupils: Pupils are equal, round, and reactive to light.   Cardiovascular:      Rate and Rhythm: Normal rate and regular rhythm.      Heart sounds: Murmur heard.   Pulmonary:      Effort: Pulmonary effort is normal.   Abdominal:      General: Bowel sounds are normal. There is no distension.      Palpations: Abdomen is soft.   Musculoskeletal:      Cervical back: Normal range of motion.      Right lower leg: No edema.      Left lower leg: No edema.   Skin:     General: Skin is warm.      Capillary Refill: Capillary refill takes 2 to 3 seconds.   Neurological:      General: No focal deficit present.      Mental Status: She is alert and oriented to person, place, and time.   Psychiatric:         Mood and Affect: Mood normal.         Behavior: Behavior normal.            Significant Labs:  CBC:  Recent Labs   Lab 05/05/25  1142 05/06/25  0841 05/07/25  0543   WBC 10.83 10.42 10.32   RBC 5.72* 5.41* 5.10   HGB 15.9 15.0 14.2   HCT 47.4 46.2 43.4    258 234   MCV 83 85 85   MCH 27.8 27.7 27.8   MCHC 33.5 32.5 32.7     BNP:  No results for input(s): "BNP" in the last 168 hours.    Invalid input(s): "BNPTRIAGELBLO"  CMP:  Recent Labs   Lab 05/05/25  1142 05/06/25  0841 05/07/25  0543   GLU 89 116* 91   CALCIUM 8.4* 9.1 8.8   ALBUMIN 3.9 3.8 3.4*   PROT 7.8 7.5 6.9    138 138   K 3.5 3.8 3.5   CO2 21* 24 24    102 105   BUN 13 16 17   CREATININE 0.7 0.8 0.7   ALKPHOS 173* 162* 152*   ALT 28 27 25   AST 23 21 20   BILITOT 0.5 0.4 0.4      Coagulation:   No results for input(s): "PT", "INR", "APTT" in the last 168 hours.  LDH:  No " "results for input(s): "LDH" in the last 72 hours.  Microbiology:  Microbiology Results (last 7 days)       ** No results found for the last 168 hours. **            I have reviewed all pertinent labs within the past 24 hours.    Estimated Creatinine Clearance: 120.5 mL/min (based on SCr of 0.7 mg/dL).    Diagnostic Results:  I have reviewed all pertinent imaging results/findings within the past 24 hours.  "

## 2025-05-07 NOTE — PROGRESS NOTES
Update    Pt presents alone in room w/spouse on phone when SW entered room.  Pt reports coping well with hospitalization so far.  Pt reports that she is excited to go to the beach and swim again once she is completely off IV Remodulin.  SW provided general support and encouragement.  Pt denies additional needs or concerns at this time. SW remains available.   No

## 2025-05-08 ENCOUNTER — TELEPHONE (OUTPATIENT)
Dept: TRANSPLANT | Facility: CLINIC | Age: 35
End: 2025-05-08
Payer: COMMERCIAL

## 2025-05-08 VITALS
DIASTOLIC BLOOD PRESSURE: 91 MMHG | HEIGHT: 60 IN | SYSTOLIC BLOOD PRESSURE: 122 MMHG | TEMPERATURE: 98 F | BODY MASS INDEX: 43.63 KG/M2 | RESPIRATION RATE: 18 BRPM | WEIGHT: 222.25 LBS | HEART RATE: 83 BPM | OXYGEN SATURATION: 96 %

## 2025-05-08 LAB
ABSOLUTE EOSINOPHIL (OHS): 0.69 K/UL
ABSOLUTE MONOCYTE (OHS): 0.67 K/UL (ref 0.3–1)
ABSOLUTE NEUTROPHIL COUNT (OHS): 7.25 K/UL (ref 1.8–7.7)
ALBUMIN SERPL BCP-MCNC: 3.4 G/DL (ref 3.5–5.2)
ALP SERPL-CCNC: 156 UNIT/L (ref 40–150)
ALT SERPL W/O P-5'-P-CCNC: 29 UNIT/L (ref 10–44)
ANION GAP (OHS): 10 MMOL/L (ref 8–16)
AST SERPL-CCNC: 33 UNIT/L (ref 11–45)
BASOPHILS # BLD AUTO: 0.06 K/UL
BASOPHILS NFR BLD AUTO: 0.6 %
BILIRUB SERPL-MCNC: 0.3 MG/DL (ref 0.1–1)
BUN SERPL-MCNC: 17 MG/DL (ref 6–20)
CALCIUM SERPL-MCNC: 8.4 MG/DL (ref 8.7–10.5)
CHLORIDE SERPL-SCNC: 103 MMOL/L (ref 95–110)
CO2 SERPL-SCNC: 24 MMOL/L (ref 23–29)
CREAT SERPL-MCNC: 0.7 MG/DL (ref 0.5–1.4)
ERYTHROCYTE [DISTWIDTH] IN BLOOD BY AUTOMATED COUNT: 14.2 % (ref 11.5–14.5)
GFR SERPLBLD CREATININE-BSD FMLA CKD-EPI: >60 ML/MIN/1.73/M2
GLUCOSE SERPL-MCNC: 92 MG/DL (ref 70–110)
HCT VFR BLD AUTO: 43.2 % (ref 37–48.5)
HGB BLD-MCNC: 14.4 GM/DL (ref 12–16)
IMM GRANULOCYTES # BLD AUTO: 0.06 K/UL (ref 0–0.04)
IMM GRANULOCYTES NFR BLD AUTO: 0.6 % (ref 0–0.5)
LYMPHOCYTES # BLD AUTO: 1.51 K/UL (ref 1–4.8)
MCH RBC QN AUTO: 27.9 PG (ref 27–31)
MCHC RBC AUTO-ENTMCNC: 33.3 G/DL (ref 32–36)
MCV RBC AUTO: 84 FL (ref 82–98)
NUCLEATED RBC (/100WBC) (OHS): 0 /100 WBC
PLATELET # BLD AUTO: 259 K/UL (ref 150–450)
PMV BLD AUTO: 11.1 FL (ref 9.2–12.9)
POTASSIUM SERPL-SCNC: 3.9 MMOL/L (ref 3.5–5.1)
PROT SERPL-MCNC: 6.8 GM/DL (ref 6–8.4)
RBC # BLD AUTO: 5.16 M/UL (ref 4–5.4)
RELATIVE EOSINOPHIL (OHS): 6.7 %
RELATIVE LYMPHOCYTE (OHS): 14.7 % (ref 18–48)
RELATIVE MONOCYTE (OHS): 6.5 % (ref 4–15)
RELATIVE NEUTROPHIL (OHS): 70.9 % (ref 38–73)
SODIUM SERPL-SCNC: 137 MMOL/L (ref 136–145)
WBC # BLD AUTO: 10.24 K/UL (ref 3.9–12.7)

## 2025-05-08 PROCEDURE — 25000003 PHARM REV CODE 250: Performed by: INTERNAL MEDICINE

## 2025-05-08 PROCEDURE — 85025 COMPLETE CBC W/AUTO DIFF WBC: CPT | Performed by: PHYSICIAN ASSISTANT

## 2025-05-08 PROCEDURE — 25000003 PHARM REV CODE 250: Performed by: NURSE PRACTITIONER

## 2025-05-08 PROCEDURE — 36415 COLL VENOUS BLD VENIPUNCTURE: CPT | Performed by: PHYSICIAN ASSISTANT

## 2025-05-08 PROCEDURE — 82040 ASSAY OF SERUM ALBUMIN: CPT | Performed by: PHYSICIAN ASSISTANT

## 2025-05-08 RX ADMIN — ASPIRIN 81 MG: 81 TABLET, COATED ORAL at 08:05

## 2025-05-08 RX ADMIN — LEVOTHYROXINE SODIUM 175 MCG: 0.12 TABLET ORAL at 06:05

## 2025-05-08 RX ADMIN — TREPROSTINIL 6 MG: 1 TABLET, EXTENDED RELEASE ORAL at 08:05

## 2025-05-08 RX ADMIN — FUROSEMIDE 40 MG: 40 TABLET ORAL at 08:05

## 2025-05-08 RX ADMIN — SPIRONOLACTONE 25 MG: 25 TABLET ORAL at 08:05

## 2025-05-08 NOTE — NURSING
Discharge instructions reviewed with the patient and her . Both verbalized their understanding and deny any questions at this time. Patient preparing for discharge and will walk off the unit with her .

## 2025-05-08 NOTE — DISCHARGE SUMMARY
[facility] - [facility]  Heart Transplant  Discharge Summary      Patient Name: Mya Sarmiento  MRN: [id number]  Admission Date: 5/5/2025  Hospital Length of Stay: 3 days  Discharge Date and Time: 05/08/2025 12:37 PM  Attending Physician: Carlota att. providers found   Discharging Provider: Baron Gregorio NP  Primary Care Provider: Carlota Primary Doctor     HPI: 35 YO F w/ severe WHO group 1 PH, obesity and thyroid disease, on subcutaenous treprostinil and combination macitentan-tadalafil, and was recently started on sotatercept for her severe PH who is admitted to transition from IV to oral treprostinil. She underwent a RHC on 1/10/25 which showed dramatic improvement in her PA pressures and PVR when compared with prior. (When compared to RHC done last year, there has been a significant improvement in her PA pressures (improved from PA=93/26 mm of Hg with PA mean=51 mm of Hg and PVR of 8 to PA= 59/23 mm of Hg, PA mean=36 mm of Hg and PVR=5.4). Given these improvements, and her low risk REVEAL score, and after discussing with the patient the decision was made to admit her to the hospital for monitored transitioning of her remodulin to orenitram (oral treprostinil). She currently endorses no complaints and understands the plan. Denies CP, palpitations, syncope, presyncope, fevers, n/v/d, PND, orthopnea. She brought her home medications as well.     * No surgery found *     Hospital Course: Patient admitted for transition from Remodulin to po Orenitram.  She completed a 3 day transition to Orenitram without complications or symptoms.  Patient reports she feels well and is ready for discharge.  Patient discharged to home in stable condition on po Orenitram.  Will plan for repeat echo in 3 months and repeat RHC in 6 months.     Consults (From admission, onward)          Status Ordering Provider     Inpatient consult to Midline team  Once        Provider:  (Not yet assigned)    Completed TIMOTHY TANG           Pending Diagnostic Studies:       None          Final Active Diagnoses:    Diagnosis Date Noted POA    PRINCIPAL PROBLEM:  Pulmonary hypertension [I27.20] 12/01/2022 Yes    Chronic pulmonary heart disease [I27.9] 01/10/2023 Yes    WHO group 1 pulmonary arterial hypertension [I27.21] 12/01/2022 Yes    BMI 38.0-38.9,adult [Z68.38] 11/09/2022 Not Applicable      Problems Resolved During this Admission:      Discharged Condition: good    Disposition: Home or Self Care    Patient Instructions:   No discharge procedures on file.  Medications:  Reconciled Home Medications:      Medication List        START taking these medications      treprostinil diolamine 1 mg Tbsr  Take 6 tablets (6 mg total) by mouth 3 (three) times daily.            CONTINUE taking these medications      aspirin 81 MG EC tablet  Commonly known as: ECOTRIN  TAKE 1 TABLET BY MOUTH EVERY DAY     furosemide 40 MG tablet  Commonly known as: LASIX  Take 1 tablet (40 mg total) by mouth once daily.     levothyroxine 175 MCG tablet  Commonly known as: SYNTHROID, LEVOTHROID  TAKE 1 TABLET BY MOUTH EVERY MORNING.     OPSYNVI 10-40 mg Tab  Generic drug: macitentan-tadalafil  Take 1 tablet by mouth once daily.     pregabalin 50 MG capsule  Commonly known as: LYRICA  TAKE 2 CAPSULES (100 MG TOTAL) BY MOUTH EVERY EVENING.     spironolactone 25 MG tablet  Commonly known as: ALDACTONE  TAKE 1 TABLET BY MOUTH EVERY DAY     WINREVAIR SUBQ  Inject 1.2 mg into the skin As instructed. Every 21 days            STOP taking these medications      treprostinil 2.5 mg/mL Soln  Commonly known as: REMODULIN            Baron Gregorio NP  Heart Transplant  [facility] - Transplant Stepdown

## 2025-05-08 NOTE — PROGRESS NOTES
Discharge    SW received communication from medical team that pt is discharging today with no needs identified by medical team.  SW met with pt to assess any additional needs.  Pt presents in room with her spouse, Jeremi.  Pt AAOx4, pleasant affect.  Pt reports coping well and verbalizes agreement with discharge date and plan.  Pt reports her spouse will drive her home.  Pt and spouse deny additional needs or concerns at this time. SW remains available.

## 2025-05-08 NOTE — PROGRESS NOTES
[facility]  Heart Transplant  Progress Note    Patient Name: Mya Sarmiento  MRN: [id number]  Admission Date: 5/5/2025  Hospital Length of Stay: 3 days  Attending Physician: Vito Woodward MD  Primary Care Provider: Carlota, Primary Doctor  Principal Problem:Pulmonary hypertension    Subjective:   Interval History: Patient admitted for transition from Remodulin to po Orenitram.    She is tolerating it well with no symptoms. Remodulin off by 3pm today.   Continuous Infusions:   veletri/remodulin cassette   Intravenous Continuous        veletri/remodulin tubing   Intravenous Continuous         Scheduled Meds:   aspirin  81 mg Oral Daily    furosemide  40 mg Oral Daily    levothyroxine  175 mcg Oral QAM    macitentan-tadalafil  1 tablet Oral Daily    pregabalin  100 mg Oral QHS    spironolactone  25 mg Oral Daily    treprostinil diolamine  6 mg Oral TID     PRN Meds:  Current Facility-Administered Medications:     sodium chloride 0.9%, 10 mL, Intravenous, PRN    Review of patient's allergies indicates:   Allergen Reactions    Amoxicillin Other (See Comments)     Weak, sick, jaw tightens    Penicillins      Weak, sick, jaw tightens     Objective:     Vital Signs (Most Recent):  Temp: 97.8 °F (36.6 °C) (05/08/25 0727)  Pulse: 65 (05/08/25 1000)  Resp: 18 (05/08/25 0727)  BP: (!) 121/95 (05/08/25 0727)  SpO2: 98 % (05/08/25 0727) Vital Signs (24h Range):  Temp:  [97.7 °F (36.5 °C)-98 °F (36.7 °C)] 97.8 °F (36.6 °C)  Pulse:  [56-77] 65  Resp:  [18] 18  SpO2:  [94 %-98 %] 98 %  BP: (106-121)/(61-95) 121/95     Patient Vitals for the past 72 hrs (Last 3 readings):   Weight   05/08/25 0605 100.8 kg (222 lb 3.6 oz)   05/07/25 0600 100.2 kg (220 lb 14.4 oz)   05/06/25 0530 99.1 kg (218 lb 7.6 oz)     Body mass index is 43.4 kg/m².      Intake/Output Summary (Last 24 hours) at 5/8/2025 1032  Last data filed at 5/8/2025 0605  Gross per 24 hour   Intake 780 ml   Output 1925 ml   Net -1145 ml       Hemodynamic  "Parameters:       Telemetry: reviewed     Physical Exam  Vitals and nursing note reviewed.   Constitutional:       Appearance: Normal appearance.   HENT:      Head: Normocephalic.      Nose: Nose normal.      Mouth/Throat:      Mouth: Mucous membranes are moist.   Eyes:      Extraocular Movements: Extraocular movements intact.      Pupils: Pupils are equal, round, and reactive to light.   Cardiovascular:      Rate and Rhythm: Normal rate and regular rhythm.      Heart sounds: Murmur heard.   Pulmonary:      Effort: Pulmonary effort is normal.   Abdominal:      General: Bowel sounds are normal. There is no distension.      Palpations: Abdomen is soft.   Musculoskeletal:      Cervical back: Normal range of motion.      Right lower leg: No edema.      Left lower leg: No edema.   Skin:     General: Skin is warm.      Capillary Refill: Capillary refill takes 2 to 3 seconds.   Neurological:      General: No focal deficit present.      Mental Status: She is alert and oriented to person, place, and time.   Psychiatric:         Mood and Affect: Mood normal.         Behavior: Behavior normal.            Significant Labs:  CBC:  Recent Labs   Lab 05/06/25  0841 05/07/25  0543 05/08/25  0619   WBC 10.42 10.32 10.24   RBC 5.41* 5.10 5.16   HGB 15.0 14.2 14.4   HCT 46.2 43.4 43.2    234 259   MCV 85 85 84   MCH 27.7 27.8 27.9   MCHC 32.5 32.7 33.3     BNP:  No results for input(s): "BNP" in the last 168 hours.    Invalid input(s): "BNPTRIAGELBLO"  CMP:  Recent Labs   Lab 05/06/25  0841 05/07/25  0543 05/08/25  0619   * 91 92   CALCIUM 9.1 8.8 8.4*   ALBUMIN 3.8 3.4* 3.4*   PROT 7.5 6.9 6.8    138 137   K 3.8 3.5 3.9   CO2 24 24 24    105 103   BUN 16 17 17   CREATININE 0.8 0.7 0.7   ALKPHOS 162* 152* 156*   ALT 27 25 29   AST 21 20 33   BILITOT 0.4 0.4 0.3      Coagulation:   No results for input(s): "PT", "INR", "APTT" in the last 168 hours.  LDH:  No results for input(s): "LDH" in the last 72 " hours.  Microbiology:  Microbiology Results (last 7 days)       ** No results found for the last 168 hours. **            I have reviewed all pertinent labs within the past 24 hours.    Estimated Creatinine Clearance: 120.8 mL/min (based on SCr of 0.7 mg/dL).    Diagnostic Results:  I have reviewed all pertinent imaging results/findings within the past 24 hours.  Assessment and Plan:      35 YO F w/ severe WHO group 1 PH, obesity and thyroid disease, on subcutaenous treprostinil and combination macitentan-tadalafil, and was recently started on sotatercept for her severe PH who is admitted to transition from IV to oral treprostinil. She underwent a RHC on 1/10/25 which showed dramatic improvement in her PA pressures and PVR when compared with prior. (When compared to RHC done last year, there has been a significant improvement in her PA pressures (improved from PA=93/26 mm of Hg with PA mean=51 mm of Hg and PVR of 8 to PA= 59/23 mm of Hg, PA mean=36 mm of Hg and PVR=5.4). Given these improvements, and her low risk REVEAL score, and after discussing with the patient the decision was made to admit her to the hospital for monitored transitioning of her remodulin to orenitram (oral treprostinil). She currently endorses no complaints and understands the plan. Denies CP, palpitations, syncope, presyncope, fevers, n/v/d, PND, orthopnea. She brought her home medications as well.       * Pulmonary hypertension  -35 YO F w/ severe WHO group 1 PH, obesity and thyroid disease, on subcutaenous treprostinil and combination macitentan-tadalafil, and was recently started on sotatercept for her severe PH.  -RHC in January 1/10/25 RA: 4, PAP: 59/23 (36), PWP: 8, CO: 4.7, CI: 2.5 improvement in PA pressures  -Transitioned from IV to oral treprostinil.  -Continue PO collette-tadalafil(pt has home med).  -Cont home dose of PO furosemide, georgette.   -On discharge; will get repeat echo in 3 months and repeat RHC in 6 months      BMI  38.0-38.9,adult  -Body mass index is 42.67 kg/m².      Chronic pulmonary heart disease  -RV failure with Pulm HTN  -Last echo 6/18/24 EF: 55-60%, LVEDD: 4.39, Mild RVE with function mildly reduced     WHO group 1 pulmonary arterial hypertension  -See above Pulmonary HTN         Baron Gregorio, NP  Heart Transplant  [facility] - [facility]

## 2025-05-08 NOTE — PLAN OF CARE
- Patient admitted 5/5/25 for switch from subcutaneous Remodulin to PO Orenitram.  - Remodulin continued IV while inpatient; turned off yesterday afternoon.  - Orenitram 6 mg administered at bedtime (TID). Patient took med with a snack.  - VS stable overnight; no issues with medication change.  - Urine output= 275 mL overnight.  - Patient denies pain.  -  at bedside.  - Plan for discharge today.

## 2025-05-08 NOTE — ASSESSMENT & PLAN NOTE
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  -RHC in January 1/10/25 RA: 4, PAP: 59/23 (36), PWP: 8, CO: 4.7, CI: 2.5 improvement in PA pressures  -Transitioned from IV to oral treprostinil.  -Continue PO collette-tadalafil(pt has home med).  -Cont home dose of PO furosemide, georgette.   -On discharge; will get repeat echo in 3 months and repeat RHC in 6 months

## 2025-05-08 NOTE — SUBJECTIVE & OBJECTIVE
Interval History: Patient admitted for transition from Remodulin to po Orenitram.    She is tolerating it well with no symptoms. Remodulin off by 3pm today.   Continuous Infusions:   veletri/remodulin cassette   Intravenous Continuous        veletri/remodulin tubing   Intravenous Continuous         Scheduled Meds:   aspirin  81 mg Oral Daily    furosemide  40 mg Oral Daily    levothyroxine  175 mcg Oral QAM    macitentan-tadalafil  1 tablet Oral Daily    pregabalin  100 mg Oral QHS    spironolactone  25 mg Oral Daily    treprostinil diolamine  6 mg Oral TID     PRN Meds:  Current Facility-Administered Medications:     sodium chloride 0.9%, 10 mL, Intravenous, PRN    Review of patient's allergies indicates:   Allergen Reactions    Amoxicillin Other (See Comments)     Weak, sick, jaw tightens    Penicillins      Weak, sick, jaw tightens     Objective:     Vital Signs (Most Recent):  Temp: 97.8 °F (36.6 °C) (05/08/25 0727)  Pulse: 65 (05/08/25 1000)  Resp: 18 (05/08/25 0727)  BP: (!) 121/95 (05/08/25 0727)  SpO2: 98 % (05/08/25 0727) Vital Signs (24h Range):  Temp:  [97.7 °F (36.5 °C)-98 °F (36.7 °C)] 97.8 °F (36.6 °C)  Pulse:  [56-77] 65  Resp:  [18] 18  SpO2:  [94 %-98 %] 98 %  BP: (106-121)/(61-95) 121/95     Patient Vitals for the past 72 hrs (Last 3 readings):   Weight   05/08/25 0605 100.8 kg (222 lb 3.6 oz)   05/07/25 0600 100.2 kg (220 lb 14.4 oz)   05/06/25 0530 99.1 kg (218 lb 7.6 oz)     Body mass index is 43.4 kg/m².      Intake/Output Summary (Last 24 hours) at 5/8/2025 1032  Last data filed at 5/8/2025 0605  Gross per 24 hour   Intake 780 ml   Output 1925 ml   Net -1145 ml       Hemodynamic Parameters:       Telemetry: reviewed     Physical Exam  Vitals and nursing note reviewed.   Constitutional:       Appearance: Normal appearance.   HENT:      Head: Normocephalic.      Nose: Nose normal.      Mouth/Throat:      Mouth: Mucous membranes are moist.   Eyes:      Extraocular Movements: Extraocular movements  "intact.      Pupils: Pupils are equal, round, and reactive to light.   Cardiovascular:      Rate and Rhythm: Normal rate and regular rhythm.      Heart sounds: Murmur heard.   Pulmonary:      Effort: Pulmonary effort is normal.   Abdominal:      General: Bowel sounds are normal. There is no distension.      Palpations: Abdomen is soft.   Musculoskeletal:      Cervical back: Normal range of motion.      Right lower leg: No edema.      Left lower leg: No edema.   Skin:     General: Skin is warm.      Capillary Refill: Capillary refill takes 2 to 3 seconds.   Neurological:      General: No focal deficit present.      Mental Status: She is alert and oriented to person, place, and time.   Psychiatric:         Mood and Affect: Mood normal.         Behavior: Behavior normal.            Significant Labs:  CBC:  Recent Labs   Lab 05/06/25  0841 05/07/25  0543 05/08/25  0619   WBC 10.42 10.32 10.24   RBC 5.41* 5.10 5.16   HGB 15.0 14.2 14.4   HCT 46.2 43.4 43.2    234 259   MCV 85 85 84   MCH 27.7 27.8 27.9   MCHC 32.5 32.7 33.3     BNP:  No results for input(s): "BNP" in the last 168 hours.    Invalid input(s): "BNPTRIAGELBLO"  CMP:  Recent Labs   Lab 05/06/25  0841 05/07/25  0543 05/08/25  0619   * 91 92   CALCIUM 9.1 8.8 8.4*   ALBUMIN 3.8 3.4* 3.4*   PROT 7.5 6.9 6.8    138 137   K 3.8 3.5 3.9   CO2 24 24 24    105 103   BUN 16 17 17   CREATININE 0.8 0.7 0.7   ALKPHOS 162* 152* 156*   ALT 27 25 29   AST 21 20 33   BILITOT 0.4 0.4 0.3      Coagulation:   No results for input(s): "PT", "INR", "APTT" in the last 168 hours.  LDH:  No results for input(s): "LDH" in the last 72 hours.  Microbiology:  Microbiology Results (last 7 days)       ** No results found for the last 168 hours. **            I have reviewed all pertinent labs within the past 24 hours.    Estimated Creatinine Clearance: 120.8 mL/min (based on SCr of 0.7 mg/dL).    Diagnostic Results:  I have reviewed all pertinent imaging " results/findings within the past 24 hours.

## 2025-05-08 NOTE — TELEPHONE ENCOUNTER
NN received a message from the patient about how she is supposed to titrate her Orenitram. Patient states she was discharged on 6 mg of Orenitram. NN called the patient and stated she should contact [facility] to have then schedule nursing to assist the patient with her out patent titration of her Orenitram. NN stated she will also reach out to [facility] to ask that they call the patient post-discharge. Patient verbalized understanding of all of the above information.

## 2025-05-15 ENCOUNTER — DOCUMENTATION ONLY (OUTPATIENT)
Dept: TRANSPLANT | Facility: CLINIC | Age: 35
End: 2025-05-15
Payer: COMMERCIAL

## 2025-05-15 NOTE — PROGRESS NOTES
Pt CardioMems transmission received and review.          5/15/2025    10:16 AM 5/15/2025    10:15 AM 5/15/2025    10:14 AM 5/15/2025    10:13 AM 5/15/2025    10:12 AM 4/9/2025    11:29 AM 4/9/2025    11:28 AM   CardioMEMS Transmission    Transmission Date 5/15/2025 5/5/2025 4/29/2025 4/22/2025 4/16/2025 4/9/2025 4/4/2025   Transmission Type Maintenance Maintenance Maintenance Maintenance Maintenance Maintenance Maintenance   PAS 48 49 45 53 55 46 47   ALEKSANDER 38 38 35 38 39 33 37   PAD  33 33 30 29 30 28 32   Medication Adjustments  No No No No No No No           Medications changed? No    Patient contacted? No    Will continue to monitor.

## 2025-05-20 ENCOUNTER — CLINICAL SUPPORT (OUTPATIENT)
Dept: TRANSPLANT | Facility: CLINIC | Age: 35
End: 2025-05-20
Payer: COMMERCIAL

## 2025-05-20 ENCOUNTER — DOCUMENTATION ONLY (OUTPATIENT)
Dept: TRANSPLANT | Facility: CLINIC | Age: 35
End: 2025-05-20
Payer: COMMERCIAL

## 2025-05-20 DIAGNOSIS — I50.42 CHRONIC COMBINED SYSTOLIC AND DIASTOLIC CHF, NYHA CLASS 3: Primary | ICD-10-CM

## 2025-05-20 PROCEDURE — 99212 OFFICE O/P EST SF 10 MIN: CPT | Mod: S$GLB,,, | Performed by: INTERNAL MEDICINE

## 2025-05-20 NOTE — PROGRESS NOTES
Pt CardioMems transmission received and review.          5/20/2025    10:31 AM 5/15/2025    10:16 AM 5/15/2025    10:15 AM 5/15/2025    10:14 AM 5/15/2025    10:13 AM 5/15/2025    10:12 AM 4/9/2025    11:29 AM   CardioMEMS Transmission    Transmission Date 5/20/2025 5/15/2025 5/5/2025 4/29/2025 4/22/2025 4/16/2025 4/9/2025   Transmission Type Maintenance Maintenance Maintenance Maintenance Maintenance Maintenance Maintenance   PAS 44 48 49 45 53 55 46   ALEKSANDER 35 38 38 35 38 39 33   PAD  30 33 33 30 29 30 28   Medication Adjustments  No No No No No No No         Pressures are stable.    Medications changed? No    Patient contacted? No    Will continue to monitor.

## 2025-05-21 NOTE — PROGRESS NOTES
Pt CardioMems transmission received and reviewed.          5/20/2025    10:31 AM 5/15/2025    10:16 AM 5/15/2025    10:15 AM 5/15/2025    10:14 AM 5/15/2025    10:13 AM 5/15/2025    10:12 AM 4/9/2025    11:29 AM   CardioMEMS Transmission    Transmission Date 5/20/2025 5/15/2025 5/5/2025 4/29/2025 4/22/2025 4/16/2025 4/9/2025   Transmission Type Maintenance Maintenance Maintenance Maintenance Maintenance Maintenance Maintenance   PAS 44 48 49 45 53 55 46   ALEKSANDER 35 38 38 35 38 39 33   PAD  30 33 33 30 29 30 28   Medication Adjustments  No No No No No No No         Cardiomems waveform interpretations, trends, and reports are monitored weekly via [email or web address]. Adjustments made per documentation. Will continue to monitor.

## 2025-05-27 RX ORDER — LEVOTHYROXINE SODIUM 175 UG/1
175 TABLET ORAL EVERY MORNING
Qty: 90 TABLET | Refills: 0 | Status: SHIPPED | OUTPATIENT
Start: 2025-05-27

## 2025-05-27 NOTE — TELEPHONE ENCOUNTER
Refill Routing Note   Medication(s) are not appropriate for processing by [facility] for the following reason(s):        Required labs outdated    [facility] action(s):  Defer               Appointments  past 12m or future 3m with PCP    Date Provider   Last Visit   10/30/2024 Ashanti Turner MD   Next Visit   Visit date not found Ashanti Turner MD   ED visits in past 90 days: 0        Note composed:3:41 PM 05/27/2025

## 2025-05-30 RX ORDER — ASPIRIN 81 MG/1
81 TABLET ORAL DAILY
Qty: 90 TABLET | Refills: 3 | Status: SHIPPED | OUTPATIENT
Start: 2025-05-30

## 2025-05-30 RX ORDER — ASPIRIN 81 MG/1
81 TABLET ORAL
Qty: 90 TABLET | Refills: 3 | Status: SHIPPED | OUTPATIENT
Start: 2025-05-30

## 2025-06-02 ENCOUNTER — CLINICAL SUPPORT (OUTPATIENT)
Dept: OBSTETRICS AND GYNECOLOGY | Facility: CLINIC | Age: 35
End: 2025-06-02
Payer: COMMERCIAL

## 2025-06-02 DIAGNOSIS — Z79.890 HORMONE REPLACEMENT THERAPY (HRT): Primary | ICD-10-CM

## 2025-06-02 PROCEDURE — 96372 THER/PROPH/DIAG INJ SC/IM: CPT | Mod: S$GLB,,, | Performed by: OBSTETRICS & GYNECOLOGY

## 2025-06-02 RX ORDER — ESTRADIOL VALERATE 20 MG/ML
10 INJECTION INTRAMUSCULAR
Status: COMPLETED | OUTPATIENT
Start: 2025-06-02 | End: 2025-06-02

## 2025-06-02 RX ADMIN — ESTRADIOL VALERATE 10 MG: 20 INJECTION INTRAMUSCULAR at 11:06

## 2025-06-09 ENCOUNTER — PATIENT MESSAGE (OUTPATIENT)
Dept: TRANSPLANT | Facility: CLINIC | Age: 35
End: 2025-06-09
Payer: COMMERCIAL

## 2025-06-12 ENCOUNTER — DOCUMENTATION ONLY (OUTPATIENT)
Dept: TRANSPLANT | Facility: CLINIC | Age: 35
End: 2025-06-12
Payer: COMMERCIAL

## 2025-06-12 NOTE — PROGRESS NOTES
Pt CardioMems transmission received and review.          6/12/2025    12:39 PM 6/12/2025    12:38 PM 6/12/2025    12:37 PM 6/12/2025    12:36 PM 5/20/2025    10:31 AM 5/15/2025    10:16 AM 5/15/2025    10:15 AM   CardioMEMS Transmission    Transmission Date 6/12/2025 6/7/2025 6/2/2025 5/28/2025 5/20/2025 5/15/2025 5/5/2025   Transmission Type Maintenance Maintenance Maintenance Maintenance Maintenance Maintenance Maintenance   PAS 50 45 44 50 44 48 49   ALEKSANDER 37 36 33 38 35 38 38   PAD  30 30 28 31 30 33 33   Medication Adjustments  No No No No No No No           Medications changed? No    Patient contacted? No    Will continue to monitor.

## 2025-06-16 DIAGNOSIS — I27.20 CHRONIC PULMONARY HYPERTENSION: Primary | ICD-10-CM

## 2025-06-16 DIAGNOSIS — Z79.899 POLYPHARMACY: ICD-10-CM

## 2025-06-16 DIAGNOSIS — R06.82 TACHYPNEA: ICD-10-CM

## 2025-06-16 DIAGNOSIS — R06.02 SHORTNESS OF BREATH: ICD-10-CM

## 2025-06-17 ENCOUNTER — TELEPHONE (OUTPATIENT)
Dept: TRANSPLANT | Facility: CLINIC | Age: 35
End: 2025-06-17
Payer: COMMERCIAL

## 2025-06-18 ENCOUNTER — DOCUMENTATION ONLY (OUTPATIENT)
Dept: TRANSPLANT | Facility: CLINIC | Age: 35
End: 2025-06-18
Payer: COMMERCIAL

## 2025-06-18 NOTE — PROGRESS NOTES
Pt CardioMems transmission received and review.          6/18/2025    10:04 AM 6/12/2025    12:39 PM 6/12/2025    12:38 PM 6/12/2025    12:37 PM 6/12/2025    12:36 PM 5/20/2025    10:31 AM 5/15/2025    10:16 AM   CardioMEMS Transmission    Transmission Date 6/18/2025 6/12/2025 6/7/2025 6/2/2025 5/28/2025 5/20/2025 5/15/2025   Transmission Type Maintenance Maintenance Maintenance Maintenance Maintenance Maintenance Maintenance   PAS 52 50 45 44 50 44 48   ALEKSANDER 37 37 36 33 38 35 38   PAD  30 30 30 28 31 30 33   Medication Adjustments  No No No No No No No           Medications changed? No    Patient contacted? No    Will continue to monitor.

## 2025-06-25 ENCOUNTER — PATIENT MESSAGE (OUTPATIENT)
Dept: TRANSPLANT | Facility: CLINIC | Age: 35
End: 2025-06-25
Payer: COMMERCIAL

## 2025-07-01 ENCOUNTER — CLINICAL SUPPORT (OUTPATIENT)
Dept: OBSTETRICS AND GYNECOLOGY | Facility: CLINIC | Age: 35
End: 2025-07-01
Payer: COMMERCIAL

## 2025-07-01 DIAGNOSIS — Z79.890 HORMONE REPLACEMENT THERAPY (HRT): Primary | ICD-10-CM

## 2025-07-01 PROCEDURE — 96372 THER/PROPH/DIAG INJ SC/IM: CPT | Mod: S$GLB,,, | Performed by: OBSTETRICS & GYNECOLOGY

## 2025-07-01 RX ORDER — ESTRADIOL VALERATE 20 MG/ML
10 INJECTION INTRAMUSCULAR
Status: COMPLETED | OUTPATIENT
Start: 2025-07-01 | End: 2025-07-01

## 2025-07-01 RX ADMIN — ESTRADIOL VALERATE 10 MG: 20 INJECTION INTRAMUSCULAR at 01:07

## 2025-07-01 NOTE — PROGRESS NOTES
Estradiol VALERATE given to patient in the QG. Tolerated well. Ambulatory upon exit with no new complaints.

## 2025-07-16 ENCOUNTER — CLINICAL SUPPORT (OUTPATIENT)
Dept: TRANSPLANT | Facility: CLINIC | Age: 35
End: 2025-07-16
Payer: COMMERCIAL

## 2025-07-16 DIAGNOSIS — I50.42 CHRONIC COMBINED SYSTOLIC AND DIASTOLIC CHF, NYHA CLASS 3: Primary | ICD-10-CM

## 2025-07-16 PROCEDURE — 99499 UNLISTED E&M SERVICE: CPT | Mod: S$GLB,,, | Performed by: NURSE PRACTITIONER

## 2025-07-21 ENCOUNTER — PATIENT MESSAGE (OUTPATIENT)
Dept: TRANSPLANT | Facility: CLINIC | Age: 35
End: 2025-07-21
Payer: COMMERCIAL

## 2025-07-22 DIAGNOSIS — I27.20 CHRONIC PULMONARY HYPERTENSION: Primary | ICD-10-CM

## 2025-07-22 RX ORDER — TRAMADOL HYDROCHLORIDE 50 MG/1
50 TABLET, FILM COATED ORAL EVERY 6 HOURS PRN
COMMUNITY
End: 2025-07-22 | Stop reason: SDUPTHER

## 2025-07-22 RX ORDER — TRAMADOL HYDROCHLORIDE 50 MG/1
50 TABLET, FILM COATED ORAL EVERY 6 HOURS PRN
Qty: 60 TABLET | Refills: 2 | Status: SHIPPED | OUTPATIENT
Start: 2025-07-22

## 2025-07-22 NOTE — PROGRESS NOTES
Pt CardioMems transmission received and reviewed.          6/18/2025    10:04 AM 6/12/2025    12:39 PM 6/12/2025    12:38 PM 6/12/2025    12:37 PM 6/12/2025    12:36 PM 5/20/2025    10:31 AM 5/15/2025    10:16 AM   CardioMEMS Transmission    Transmission Date 6/18/2025 6/12/2025 6/7/2025 6/2/2025 5/28/2025 5/20/2025 5/15/2025   Transmission Type Maintenance Maintenance Maintenance Maintenance Maintenance Maintenance Maintenance   PAS 52 50 45 44 50 44 48   ALEKSANDER 37 37 36 33 38 35 38   PAD  30 30 30 28 31 30 33   Medication Adjustments  No No No No No No No         Cardiomems waveform interpretations, trends, and reports are monitored weekly via [email or web address]. Adjustments made per documentation. Will continue to monitor.

## 2025-07-24 ENCOUNTER — TELEPHONE (OUTPATIENT)
Dept: TRANSPLANT | Facility: CLINIC | Age: 35
End: 2025-07-24
Payer: COMMERCIAL

## 2025-07-24 NOTE — TELEPHONE ENCOUNTER
NN received a call from [facility] pharmacy asking for verbal prescription for patient's Opsynvi. NN spoke to Rebel BEE Who placed the refill prescription under Dr. Woodward.

## 2025-08-04 ENCOUNTER — PATIENT MESSAGE (OUTPATIENT)
Dept: TRANSPLANT | Facility: CLINIC | Age: 35
End: 2025-08-04
Payer: COMMERCIAL

## 2025-08-06 ENCOUNTER — DOCUMENTATION ONLY (OUTPATIENT)
Dept: TRANSPLANT | Facility: CLINIC | Age: 35
End: 2025-08-06
Payer: COMMERCIAL

## 2025-08-06 NOTE — PROGRESS NOTES
Pt CardioMems transmission received and review.          8/6/2025     8:34 AM 8/6/2025     8:33 AM 8/6/2025     8:32 AM 8/6/2025     8:31 AM 8/6/2025     8:30 AM 8/6/2025     8:29 AM 8/6/2025     8:28 AM   CardioMEMS Transmission    Transmission Date 8/6/2025 7/30/2025 7/22/2025 7/18/2025 7/9/2025 7/2/2025 6/27/2025   Transmission Type Maintenance Maintenance Maintenance Maintenance Maintenance Maintenance Maintenance   PAS 48 56 50 50 48 47 49   ALEKSANDER 37 42 36 38 36 37 36   PAD  30 33 29 31 30 32 30   Medication Adjustments  No No No No No No No         Pressures are stable.    Medications changed? No    Patient contacted? No    Will continue to monitor.

## 2025-08-11 ENCOUNTER — LAB VISIT (OUTPATIENT)
Dept: LAB | Facility: HOSPITAL | Age: 35
End: 2025-08-11
Attending: INTERNAL MEDICINE
Payer: COMMERCIAL

## 2025-08-11 DIAGNOSIS — R06.82 TACHYPNEA: ICD-10-CM

## 2025-08-11 DIAGNOSIS — Z79.899 POLYPHARMACY: ICD-10-CM

## 2025-08-11 LAB
ABSOLUTE EOSINOPHIL (OHS): 0.3 K/UL
ABSOLUTE MONOCYTE (OHS): 0.67 K/UL (ref 0.3–1)
ABSOLUTE NEUTROPHIL COUNT (OHS): 9.09 K/UL (ref 1.8–7.7)
ALBUMIN SERPL BCP-MCNC: 4.3 G/DL (ref 3.5–5.2)
ALP SERPL-CCNC: 168 UNIT/L (ref 40–150)
ALT SERPL W/O P-5'-P-CCNC: 54 UNIT/L (ref 10–44)
ANION GAP (OHS): 15 MMOL/L (ref 8–16)
AST SERPL-CCNC: 42 UNIT/L (ref 11–45)
BASOPHILS # BLD AUTO: 0.07 K/UL
BASOPHILS NFR BLD AUTO: 0.6 %
BILIRUB SERPL-MCNC: 0.4 MG/DL (ref 0.1–1)
BUN SERPL-MCNC: 25 MG/DL (ref 6–20)
CALCIUM SERPL-MCNC: 9.8 MG/DL (ref 8.7–10.5)
CHLORIDE SERPL-SCNC: 99 MMOL/L (ref 95–110)
CO2 SERPL-SCNC: 26 MMOL/L (ref 23–29)
CREAT SERPL-MCNC: 1.2 MG/DL (ref 0.5–1.4)
ERYTHROCYTE [DISTWIDTH] IN BLOOD BY AUTOMATED COUNT: 14.4 % (ref 11.5–14.5)
GFR SERPLBLD CREATININE-BSD FMLA CKD-EPI: >60 ML/MIN/1.73/M2
GLUCOSE SERPL-MCNC: 100 MG/DL (ref 70–110)
HCT VFR BLD AUTO: 49.5 % (ref 37–48.5)
HGB BLD-MCNC: 16.8 GM/DL (ref 12–16)
IMM GRANULOCYTES # BLD AUTO: 0.07 K/UL (ref 0–0.04)
IMM GRANULOCYTES NFR BLD AUTO: 0.6 % (ref 0–0.5)
LYMPHOCYTES # BLD AUTO: 2.18 K/UL (ref 1–4.8)
MAGNESIUM SERPL-MCNC: 1.7 MG/DL (ref 1.6–2.6)
MCH RBC QN AUTO: 29.3 PG (ref 27–31)
MCHC RBC AUTO-ENTMCNC: 33.9 G/DL (ref 32–36)
MCV RBC AUTO: 86 FL (ref 82–98)
NT-PROBNP SERPL-MCNC: 40 PG/ML
NUCLEATED RBC (/100WBC) (OHS): 0 /100 WBC
PLATELET # BLD AUTO: 322 K/UL (ref 150–450)
PMV BLD AUTO: 11.4 FL (ref 9.2–12.9)
POTASSIUM SERPL-SCNC: 4.5 MMOL/L (ref 3.5–5.1)
PROT SERPL-MCNC: 8.5 GM/DL (ref 6–8.4)
RBC # BLD AUTO: 5.73 M/UL (ref 4–5.4)
RELATIVE EOSINOPHIL (OHS): 2.4 %
RELATIVE LYMPHOCYTE (OHS): 17.6 % (ref 18–48)
RELATIVE MONOCYTE (OHS): 5.4 % (ref 4–15)
RELATIVE NEUTROPHIL (OHS): 73.4 % (ref 38–73)
SODIUM SERPL-SCNC: 140 MMOL/L (ref 136–145)
WBC # BLD AUTO: 12.38 K/UL (ref 3.9–12.7)

## 2025-08-11 PROCEDURE — 85025 COMPLETE CBC W/AUTO DIFF WBC: CPT

## 2025-08-11 PROCEDURE — 36415 COLL VENOUS BLD VENIPUNCTURE: CPT

## 2025-08-11 PROCEDURE — 80053 COMPREHEN METABOLIC PANEL: CPT

## 2025-08-11 PROCEDURE — 83735 ASSAY OF MAGNESIUM: CPT

## 2025-08-11 PROCEDURE — 83880 ASSAY OF NATRIURETIC PEPTIDE: CPT

## 2025-08-12 ENCOUNTER — PATIENT MESSAGE (OUTPATIENT)
Dept: TRANSPLANT | Facility: CLINIC | Age: 35
End: 2025-08-12
Payer: COMMERCIAL

## 2025-08-25 ENCOUNTER — CLINICAL SUPPORT (OUTPATIENT)
Dept: OBSTETRICS AND GYNECOLOGY | Facility: CLINIC | Age: 35
End: 2025-08-25
Payer: COMMERCIAL

## 2025-08-25 DIAGNOSIS — Z79.890 HORMONE REPLACEMENT THERAPY (HRT): Primary | ICD-10-CM

## 2025-08-25 DIAGNOSIS — Z79.899 POLYPHARMACY: Primary | ICD-10-CM

## 2025-08-25 DIAGNOSIS — R06.82 TACHYPNEA: Primary | ICD-10-CM

## 2025-08-25 PROCEDURE — 99999 PR PBB SHADOW E&M-EST. PATIENT-LVL II: CPT | Mod: PBBFAC,,,

## (undated) DEVICE — ELECTRODE REM PLYHSV RETURN 9

## (undated) DEVICE — SUT MONOCYRL 4-0 PS2 UND

## (undated) DEVICE — GLOVE BIOGEL PIMICRO INDIC 6.5

## (undated) DEVICE — SOL WATER STRL IRR 1000ML

## (undated) DEVICE — COVER LIGHT HANDLE

## (undated) DEVICE — GUIDEWIRE .021X180CM J-3MM TIP

## (undated) DEVICE — BANDAGE COVERLET ADH1X3IN

## (undated) DEVICE — Device

## (undated) DEVICE — SUT 0 54IN COATED VICRYL U

## (undated) DEVICE — CATH PULMONARY WEDGE PRESS MON

## (undated) DEVICE — CATH SWAN GANZ STND 7FR

## (undated) DEVICE — TRAY MINOR GEN SURG OMC

## (undated) DEVICE — DRAPE UINDERBUT GRAD PCH

## (undated) DEVICE — IRRIGATOR ENDOSCOPY DISP.

## (undated) DEVICE — OMNIPAQUE 350 200ML

## (undated) DEVICE — BLADE ELECTRO EDGE INSULATED

## (undated) DEVICE — KIT PROBE COVER WITH GEL

## (undated) DEVICE — LUBRICANT SURGILUBE 2 OZ

## (undated) DEVICE — KIT ANTIFOG W/SPONG & FLUID

## (undated) DEVICE — TRANSDUCER ADULT DISP

## (undated) DEVICE — BLADE SURG CARBON STEEL SZ11

## (undated) DEVICE — KIT MICROINTRODUCE MINI 5X10CM

## (undated) DEVICE — SCISSOR 5MMX35CM DIRECT DRIVE

## (undated) DEVICE — SEAL UNIVERSAL 5MM-8MM XI

## (undated) DEVICE — CLOSURE SKIN STERI STRIP 1/2X4

## (undated) DEVICE — SUT CTD VICRYL VIL BR UR-6

## (undated) DEVICE — SHEATH PINNACLE INTRO 11FR

## (undated) DEVICE — SYS SEE SHARP SCP ANTIFG LNG

## (undated) DEVICE — DRAPE SCOPE PILLOW WARMER

## (undated) DEVICE — COVER PROBE US 5.5X58L NON LTX

## (undated) DEVICE — KIT MINI STICK MAX INTRO 5FR

## (undated) DEVICE — LINE 60IN PRESSURE MON.

## (undated) DEVICE — ENDOCATCH 15MM

## (undated) DEVICE — SUT STRATAFIX 0 PDS+ 22CM 9IN

## (undated) DEVICE — TRAY CATH 1-LYR URIMTR 16FR

## (undated) DEVICE — PAD PINK TRENDELENBURG POS XL

## (undated) DEVICE — SHEATH INTRODUCER 7FR 11CM

## (undated) DEVICE — DRAPE ARM DAVINCI XI

## (undated) DEVICE — TROCAR KII BLLN 12MM 10CM

## (undated) DEVICE — COVER BAND BAG 28 X 12

## (undated) DEVICE — LEGGING CLEAR POLY 2/PACK

## (undated) DEVICE — TRAY SKIN SCRUB WET PREMIUM

## (undated) DEVICE — DRAPE INCISE IOBAN 2 23X17IN

## (undated) DEVICE — GUIDEWIRE FLEX 5 .018X295CM

## (undated) DEVICE — TRAY CATH LAB OMC

## (undated) DEVICE — SET TRI-LUMEN FILTERED TUBE

## (undated) DEVICE — GUIDEWIRE ROADRUNNER .018 270

## (undated) DEVICE — STOPCOCK 3-WAY

## (undated) DEVICE — GLOVE BIOGEL PI MICRO INDIC 7

## (undated) DEVICE — COVER PROBE US GEL BAND

## (undated) DEVICE — DRAPE ABDOMINAL TIBURON 14X11

## (undated) DEVICE — DRAPE COLUMN DAVINCI XI

## (undated) DEVICE — KIT PROCEDURE STER INLET CLOSU

## (undated) DEVICE — PORT ACCESS 8MM W/120MM LOW

## (undated) DEVICE — KIT SHEATH 9FRX11CM